# Patient Record
Sex: FEMALE | Race: WHITE | NOT HISPANIC OR LATINO | Employment: FULL TIME | URBAN - METROPOLITAN AREA
[De-identification: names, ages, dates, MRNs, and addresses within clinical notes are randomized per-mention and may not be internally consistent; named-entity substitution may affect disease eponyms.]

---

## 2017-08-14 ENCOUNTER — CONVERSION ENCOUNTER (OUTPATIENT)
Dept: GENERAL RADIOLOGY | Facility: HOSPITAL | Age: 42
End: 2017-08-14

## 2018-06-01 ENCOUNTER — CONVERSION ENCOUNTER (OUTPATIENT)
Dept: FAMILY MEDICINE CLINIC | Facility: CLINIC | Age: 43
End: 2018-06-01

## 2018-06-01 ENCOUNTER — OFFICE VISIT CONVERTED (OUTPATIENT)
Dept: FAMILY MEDICINE CLINIC | Facility: CLINIC | Age: 43
End: 2018-06-01
Attending: PHYSICIAN ASSISTANT

## 2018-06-19 ENCOUNTER — OFFICE VISIT CONVERTED (OUTPATIENT)
Dept: FAMILY MEDICINE CLINIC | Facility: CLINIC | Age: 43
End: 2018-06-19
Attending: PHYSICIAN ASSISTANT

## 2018-06-19 ENCOUNTER — CONVERSION ENCOUNTER (OUTPATIENT)
Dept: FAMILY MEDICINE CLINIC | Facility: CLINIC | Age: 43
End: 2018-06-19

## 2018-09-24 ENCOUNTER — OFFICE VISIT CONVERTED (OUTPATIENT)
Dept: FAMILY MEDICINE CLINIC | Facility: CLINIC | Age: 43
End: 2018-09-24
Attending: PHYSICIAN ASSISTANT

## 2018-09-24 ENCOUNTER — CONVERSION ENCOUNTER (OUTPATIENT)
Dept: FAMILY MEDICINE CLINIC | Facility: CLINIC | Age: 43
End: 2018-09-24

## 2018-10-25 ENCOUNTER — OFFICE VISIT CONVERTED (OUTPATIENT)
Dept: FAMILY MEDICINE CLINIC | Facility: CLINIC | Age: 43
End: 2018-10-25
Attending: PHYSICIAN ASSISTANT

## 2018-11-01 ENCOUNTER — OFFICE VISIT CONVERTED (OUTPATIENT)
Dept: FAMILY MEDICINE CLINIC | Facility: CLINIC | Age: 43
End: 2018-11-01
Attending: PHYSICIAN ASSISTANT

## 2019-01-02 ENCOUNTER — CONVERSION ENCOUNTER (OUTPATIENT)
Dept: FAMILY MEDICINE CLINIC | Facility: CLINIC | Age: 44
End: 2019-01-02

## 2019-01-02 ENCOUNTER — OFFICE VISIT CONVERTED (OUTPATIENT)
Dept: FAMILY MEDICINE CLINIC | Facility: CLINIC | Age: 44
End: 2019-01-02
Attending: NURSE PRACTITIONER

## 2019-01-02 ENCOUNTER — HOSPITAL ENCOUNTER (OUTPATIENT)
Dept: GENERAL RADIOLOGY | Facility: HOSPITAL | Age: 44
Discharge: HOME | End: 2019-01-02
Attending: NURSE PRACTITIONER

## 2019-05-23 ENCOUNTER — HOSPITAL ENCOUNTER (OUTPATIENT)
Dept: URGENT CARE | Facility: CLINIC | Age: 44
Discharge: HOME | End: 2019-05-23

## 2019-12-13 ENCOUNTER — OFFICE VISIT CONVERTED (OUTPATIENT)
Dept: FAMILY MEDICINE CLINIC | Facility: CLINIC | Age: 44
End: 2019-12-13
Attending: PHYSICIAN ASSISTANT

## 2020-03-09 ENCOUNTER — OFFICE VISIT CONVERTED (OUTPATIENT)
Dept: FAMILY MEDICINE CLINIC | Facility: CLINIC | Age: 45
End: 2020-03-09
Attending: PHYSICIAN ASSISTANT

## 2020-03-09 ENCOUNTER — CONVERSION ENCOUNTER (OUTPATIENT)
Dept: FAMILY MEDICINE CLINIC | Facility: CLINIC | Age: 45
End: 2020-03-09

## 2020-04-10 ENCOUNTER — OFFICE VISIT CONVERTED (OUTPATIENT)
Dept: FAMILY MEDICINE CLINIC | Facility: CLINIC | Age: 45
End: 2020-04-10
Attending: NURSE PRACTITIONER

## 2020-04-13 ENCOUNTER — TELEPHONE CONVERTED (OUTPATIENT)
Dept: FAMILY MEDICINE CLINIC | Facility: CLINIC | Age: 45
End: 2020-04-13
Attending: PHYSICIAN ASSISTANT

## 2020-08-20 ENCOUNTER — HOSPITAL ENCOUNTER (OUTPATIENT)
Dept: LAB | Facility: HOSPITAL | Age: 45
Discharge: HOME | End: 2020-08-20
Attending: PHYSICIAN ASSISTANT

## 2020-08-20 ENCOUNTER — OFFICE VISIT CONVERTED (OUTPATIENT)
Dept: FAMILY MEDICINE CLINIC | Facility: CLINIC | Age: 45
End: 2020-08-20
Attending: PHYSICIAN ASSISTANT

## 2020-08-20 LAB
ALBUMIN SERPL-MCNC: 4.5 G/DL (ref 3.5–5)
ALBUMIN/GLOB SERPL: 1.5 {RATIO} (ref 1.4–2.6)
ALP SERPL-CCNC: 60 U/L (ref 42–98)
ALT SERPL-CCNC: 41 U/L (ref 10–40)
AMPHETAMINES UR QL SCN: NEGATIVE
ANION GAP SERPL CALC-SCNC: 18 MMOL/L (ref 8–19)
APPEARANCE UR: ABNORMAL
AST SERPL-CCNC: 37 U/L (ref 15–50)
BARBITURATES UR QL SCN: NEGATIVE
BASOPHILS # BLD AUTO: 0.09 10*3/UL (ref 0–0.2)
BASOPHILS NFR BLD AUTO: 0.9 % (ref 0–3)
BENZODIAZ UR QL SCN: NEGATIVE
BILIRUB SERPL-MCNC: 0.45 MG/DL (ref 0.2–1.3)
BILIRUB UR QL: NEGATIVE
BUN SERPL-MCNC: 15 MG/DL (ref 5–25)
BUN/CREAT SERPL: 16 {RATIO} (ref 6–20)
CALCIUM SERPL-MCNC: 9.9 MG/DL (ref 8.7–10.4)
CHLORIDE SERPL-SCNC: 101 MMOL/L (ref 99–111)
COLOR UR: ABNORMAL
CONV ABS IMM GRAN: 0.02 10*3/UL (ref 0–0.2)
CONV BACTERIA: ABNORMAL
CONV CO2: 23 MMOL/L (ref 22–32)
CONV COCAINE, UR: NEGATIVE
CONV COLLECTION SOURCE (UA): ABNORMAL
CONV IMMATURE GRAN: 0.2 % (ref 0–1.8)
CONV TOTAL PROTEIN: 7.5 G/DL (ref 6.3–8.2)
CONV UROBILINOGEN IN URINE BY AUTOMATED TEST STRIP: 1 {EHRLICHU}/DL (ref 0.1–1)
CREAT UR-MCNC: 0.92 MG/DL (ref 0.5–0.9)
DEPRECATED RDW RBC AUTO: 40.8 FL (ref 36.4–46.3)
EOSINOPHIL # BLD AUTO: 0.41 10*3/UL (ref 0–0.7)
EOSINOPHIL # BLD AUTO: 4 % (ref 0–7)
ERYTHROCYTE [DISTWIDTH] IN BLOOD BY AUTOMATED COUNT: 13.2 % (ref 11.7–14.4)
EST. AVERAGE GLUCOSE BLD GHB EST-MCNC: 111 MG/DL
GFR SERPLBLD BASED ON 1.73 SQ M-ARVRAT: >60 ML/MIN/{1.73_M2}
GLOBULIN UR ELPH-MCNC: 3 G/DL (ref 2–3.5)
GLUCOSE SERPL-MCNC: 85 MG/DL (ref 65–99)
GLUCOSE UR QL: NEGATIVE MG/DL
HBA1C MFR BLD: 5.5 % (ref 3.5–5.7)
HCG UR QL: NEGATIVE
HCT VFR BLD AUTO: 40.6 % (ref 37–47)
HGB BLD-MCNC: 13 G/DL (ref 12–16)
HGB UR QL STRIP: ABNORMAL
KETONES UR QL STRIP: NEGATIVE MG/DL
LEUKOCYTE ESTERASE UR QL STRIP: NEGATIVE
LYMPHOCYTES # BLD AUTO: 3.77 10*3/UL (ref 1–5)
LYMPHOCYTES NFR BLD AUTO: 36.5 % (ref 20–45)
MCH RBC QN AUTO: 27.3 PG (ref 27–31)
MCHC RBC AUTO-ENTMCNC: 32 G/DL (ref 33–37)
MCV RBC AUTO: 85.3 FL (ref 81–99)
METHADONE UR QL SCN: NEGATIVE
MONOCYTES # BLD AUTO: 0.79 10*3/UL (ref 0.2–1.2)
MONOCYTES NFR BLD AUTO: 7.6 % (ref 3–10)
NEUTROPHILS # BLD AUTO: 5.25 10*3/UL (ref 2–8)
NEUTROPHILS NFR BLD AUTO: 50.8 % (ref 30–85)
NITRITE UR QL STRIP: NEGATIVE
NRBC CBCN: 0 % (ref 0–0.7)
OPIATES TESTED UR SCN: NEGATIVE
OSMOLALITY SERPL CALC.SUM OF ELEC: 286 MOSM/KG (ref 273–304)
OXYCODONE UR QL SCN: NEGATIVE
PCP UR QL: NEGATIVE
PH UR STRIP.AUTO: 6 [PH] (ref 5–8)
PLATELET # BLD AUTO: 334 10*3/UL (ref 130–400)
PMV BLD AUTO: 10.9 FL (ref 9.4–12.3)
POTASSIUM SERPL-SCNC: 3.6 MMOL/L (ref 3.5–5.3)
PROT UR QL: >=300 MG/DL
RBC # BLD AUTO: 4.76 10*6/UL (ref 4.2–5.4)
RBC #/AREA URNS HPF: ABNORMAL /[HPF]
SODIUM SERPL-SCNC: 138 MMOL/L (ref 135–147)
SP GR UR: >=1.03 (ref 1–1.03)
SQUAMOUS SPT QL MICRO: ABNORMAL /[HPF]
T4 FREE SERPL-MCNC: 1.3 NG/DL (ref 0.9–1.8)
THC SERPLBLD CFM-MCNC: NEGATIVE NG/ML
TSH SERPL-ACNC: 1.02 M[IU]/L (ref 0.27–4.2)
URATE SERPL-MCNC: 8.1 MG/DL (ref 2.5–7.5)
WBC # BLD AUTO: 10.33 10*3/UL (ref 4.8–10.8)
WBC #/AREA URNS HPF: ABNORMAL /[HPF]

## 2020-08-21 LAB
25(OH)D3 SERPL-MCNC: 25.4 NG/ML (ref 30–100)
IRON SATN MFR SERPL: 14 % (ref 20–55)
IRON SERPL-MCNC: 62 UG/DL (ref 60–170)
TIBC SERPL-MCNC: 438 UG/DL (ref 245–450)
TRANSFERRIN SERPL-MCNC: 306 MG/DL (ref 250–380)

## 2020-08-25 ENCOUNTER — HOSPITAL ENCOUNTER (OUTPATIENT)
Dept: GENERAL RADIOLOGY | Facility: HOSPITAL | Age: 45
Discharge: HOME | End: 2020-08-25
Attending: PHYSICIAN ASSISTANT

## 2020-09-21 ENCOUNTER — TELEMEDICINE CONVERTED (OUTPATIENT)
Dept: FAMILY MEDICINE CLINIC | Facility: CLINIC | Age: 45
End: 2020-09-21
Attending: PHYSICIAN ASSISTANT

## 2020-10-12 ENCOUNTER — HOSPITAL ENCOUNTER (OUTPATIENT)
Dept: PREADMISSION TESTING | Facility: HOSPITAL | Age: 45
Discharge: HOME | End: 2020-10-12
Attending: OBSTETRICS & GYNECOLOGY

## 2020-10-13 LAB — SARS-COV-2 RNA SPEC QL NAA+PROBE: NOT DETECTED

## 2020-12-07 ENCOUNTER — CONVERSION ENCOUNTER (OUTPATIENT)
Dept: FAMILY MEDICINE CLINIC | Facility: CLINIC | Age: 45
End: 2020-12-07

## 2020-12-07 ENCOUNTER — OFFICE VISIT CONVERTED (OUTPATIENT)
Dept: FAMILY MEDICINE CLINIC | Facility: CLINIC | Age: 45
End: 2020-12-07
Attending: PHYSICIAN ASSISTANT

## 2020-12-07 ENCOUNTER — HOSPITAL ENCOUNTER (OUTPATIENT)
Dept: FAMILY MEDICINE CLINIC | Facility: CLINIC | Age: 45
Discharge: HOME | End: 2020-12-07
Attending: PHYSICIAN ASSISTANT

## 2020-12-09 LAB — BACTERIA SPEC AEROBE CULT: NORMAL

## 2020-12-22 ENCOUNTER — TELEMEDICINE CONVERTED (OUTPATIENT)
Dept: FAMILY MEDICINE CLINIC | Facility: CLINIC | Age: 45
End: 2020-12-22
Attending: PHYSICIAN ASSISTANT

## 2021-01-05 ENCOUNTER — HOSPITAL ENCOUNTER (OUTPATIENT)
Dept: LAB | Facility: HOSPITAL | Age: 46
Discharge: HOME | End: 2021-01-05
Attending: PHYSICIAN ASSISTANT

## 2021-01-05 ENCOUNTER — HOSPITAL ENCOUNTER (OUTPATIENT)
Dept: GENERAL RADIOLOGY | Facility: HOSPITAL | Age: 46
Discharge: HOME | End: 2021-01-05
Attending: PHYSICIAN ASSISTANT

## 2021-01-05 LAB
25(OH)D3 SERPL-MCNC: 19 NG/ML (ref 30–100)
ALBUMIN SERPL-MCNC: 3.9 G/DL (ref 3.5–5)
ALBUMIN/GLOB SERPL: 1.2 {RATIO} (ref 1.4–2.6)
ALP SERPL-CCNC: 74 U/L (ref 42–98)
ALT SERPL-CCNC: 47 U/L (ref 10–40)
ANION GAP SERPL CALC-SCNC: 14 MMOL/L (ref 8–19)
APPEARANCE UR: ABNORMAL
AST SERPL-CCNC: 53 U/L (ref 15–50)
BASOPHILS # BLD AUTO: 0.08 10*3/UL (ref 0–0.2)
BASOPHILS NFR BLD AUTO: 0.8 % (ref 0–3)
BILIRUB SERPL-MCNC: 0.34 MG/DL (ref 0.2–1.3)
BILIRUB UR QL: NEGATIVE
BUN SERPL-MCNC: 12 MG/DL (ref 5–25)
BUN/CREAT SERPL: 18 {RATIO} (ref 6–20)
CALCIUM SERPL-MCNC: 9 MG/DL (ref 8.7–10.4)
CHLORIDE SERPL-SCNC: 102 MMOL/L (ref 99–111)
CHOLEST SERPL-MCNC: 207 MG/DL (ref 107–200)
CHOLEST/HDLC SERPL: 4.8 {RATIO} (ref 3–6)
COLOR UR: ABNORMAL
CONV ABS IMM GRAN: 0.03 10*3/UL (ref 0–0.2)
CONV BACTERIA: NEGATIVE
CONV CO2: 24 MMOL/L (ref 22–32)
CONV COLLECTION SOURCE (UA): ABNORMAL
CONV HYALINE CASTS IN URINE MICRO: ABNORMAL /[LPF]
CONV IMMATURE GRAN: 0.3 % (ref 0–1.8)
CONV TOTAL PROTEIN: 7.2 G/DL (ref 6.3–8.2)
CONV UROBILINOGEN IN URINE BY AUTOMATED TEST STRIP: 0.2 {EHRLICHU}/DL (ref 0.1–1)
CREAT UR-MCNC: 0.68 MG/DL (ref 0.5–0.9)
DEPRECATED RDW RBC AUTO: 41.1 FL (ref 36.4–46.3)
EOSINOPHIL # BLD AUTO: 0.44 10*3/UL (ref 0–0.7)
EOSINOPHIL # BLD AUTO: 4.2 % (ref 0–7)
ERYTHROCYTE [DISTWIDTH] IN BLOOD BY AUTOMATED COUNT: 13.9 % (ref 11.7–14.4)
ERYTHROCYTE [SEDIMENTATION RATE] IN BLOOD: 11 MM/H (ref 0–20)
GFR SERPLBLD BASED ON 1.73 SQ M-ARVRAT: >60 ML/MIN/{1.73_M2}
GLOBULIN UR ELPH-MCNC: 3.3 G/DL (ref 2–3.5)
GLUCOSE SERPL-MCNC: 114 MG/DL (ref 65–99)
GLUCOSE UR QL: NEGATIVE MG/DL
HCT VFR BLD AUTO: 41.6 % (ref 37–47)
HDLC SERPL-MCNC: 43 MG/DL (ref 40–60)
HGB BLD-MCNC: 13.8 G/DL (ref 12–16)
HGB UR QL STRIP: NEGATIVE
KETONES UR QL STRIP: NEGATIVE MG/DL
LDLC SERPL CALC-MCNC: 124 MG/DL (ref 70–100)
LEUKOCYTE ESTERASE UR QL STRIP: NEGATIVE
LYMPHOCYTES # BLD AUTO: 3.78 10*3/UL (ref 1–5)
LYMPHOCYTES NFR BLD AUTO: 35.9 % (ref 20–45)
MCH RBC QN AUTO: 27.3 PG (ref 27–31)
MCHC RBC AUTO-ENTMCNC: 33.2 G/DL (ref 33–37)
MCV RBC AUTO: 82.2 FL (ref 81–99)
MONOCYTES # BLD AUTO: 0.74 10*3/UL (ref 0.2–1.2)
MONOCYTES NFR BLD AUTO: 7 % (ref 3–10)
NEUTROPHILS # BLD AUTO: 5.46 10*3/UL (ref 2–8)
NEUTROPHILS NFR BLD AUTO: 51.8 % (ref 30–85)
NITRITE UR QL STRIP: NEGATIVE
NRBC CBCN: 0 % (ref 0–0.7)
OSMOLALITY SERPL CALC.SUM OF ELEC: 283 MOSM/KG (ref 273–304)
PH UR STRIP.AUTO: 5.5 [PH] (ref 5–8)
PLATELET # BLD AUTO: 306 10*3/UL (ref 130–400)
PMV BLD AUTO: 11.1 FL (ref 9.4–12.3)
POTASSIUM SERPL-SCNC: 4.2 MMOL/L (ref 3.5–5.3)
PROT UR QL: NEGATIVE MG/DL
RBC # BLD AUTO: 5.06 10*6/UL (ref 4.2–5.4)
RBC #/AREA URNS HPF: ABNORMAL /[HPF]
SODIUM SERPL-SCNC: 136 MMOL/L (ref 135–147)
SP GR UR: 1.02 (ref 1–1.03)
SQUAMOUS SPT QL MICRO: ABNORMAL /[HPF]
T4 FREE SERPL-MCNC: 1 NG/DL (ref 0.9–1.8)
TRIGL SERPL-MCNC: 200 MG/DL (ref 40–150)
TSH SERPL-ACNC: 2.39 M[IU]/L (ref 0.27–4.2)
VLDLC SERPL-MCNC: 40 MG/DL (ref 5–37)
WBC # BLD AUTO: 10.53 10*3/UL (ref 4.8–10.8)
WBC #/AREA URNS HPF: ABNORMAL /[HPF]

## 2021-01-06 LAB
ASO AB SERPL-ACNC: 478 [IU]/ML (ref 0–200)
CONV RHEUMATOID FACTOR IGM: <10 [IU]/ML (ref 0–14)
CRP SERPL-MCNC: 8.9 MG/L (ref 0–5)
DSDNA AB SER-ACNC: NEGATIVE [IU]/ML
ENA AB SER IA-ACNC: NEGATIVE {RATIO}
EST. AVERAGE GLUCOSE BLD GHB EST-MCNC: 120 MG/DL
HBA1C MFR BLD: 5.8 % (ref 3.5–5.7)
URATE SERPL-MCNC: 6.2 MG/DL (ref 2.5–7.5)

## 2021-01-12 ENCOUNTER — TELEMEDICINE CONVERTED (OUTPATIENT)
Dept: FAMILY MEDICINE CLINIC | Facility: CLINIC | Age: 46
End: 2021-01-12
Attending: PHYSICIAN ASSISTANT

## 2021-01-20 ENCOUNTER — HOSPITAL ENCOUNTER (OUTPATIENT)
Dept: GENERAL RADIOLOGY | Facility: HOSPITAL | Age: 46
Discharge: HOME | End: 2021-01-20
Attending: PHYSICIAN ASSISTANT

## 2021-01-27 ENCOUNTER — HOSPITAL ENCOUNTER (OUTPATIENT)
Dept: GENERAL RADIOLOGY | Facility: HOSPITAL | Age: 46
Discharge: HOME | End: 2021-01-27
Attending: PHYSICIAN ASSISTANT

## 2021-03-09 ENCOUNTER — HOSPITAL ENCOUNTER (OUTPATIENT)
Dept: FAMILY MEDICINE CLINIC | Facility: CLINIC | Age: 46
Discharge: HOME | End: 2021-03-09
Attending: PHYSICIAN ASSISTANT

## 2021-03-09 ENCOUNTER — TELEMEDICINE CONVERTED (OUTPATIENT)
Dept: FAMILY MEDICINE CLINIC | Facility: CLINIC | Age: 46
End: 2021-03-09
Attending: PHYSICIAN ASSISTANT

## 2021-03-11 LAB — SARS-COV-2 RNA SPEC QL NAA+PROBE: NOT DETECTED

## 2021-04-14 ENCOUNTER — OFFICE VISIT CONVERTED (OUTPATIENT)
Dept: NEUROSURGERY | Facility: CLINIC | Age: 46
End: 2021-04-14
Attending: PHYSICIAN ASSISTANT

## 2021-05-11 NOTE — H&P
History and Physical      Patient Name: Sushila Stark   Patient ID: [id number]   Sex: Female   YOB: 1975    Primary Care Provider: Srikanth Jules PA-C   Referring Provider: Srikanth Jules PA-C    Visit Date: April 14, 2021    Provider: Montse Caballero PA-C   Location: [facility]   Location Address: [address] [place] [address]  [id number]   Location Phone: [phone]          Chief Complaint  · Mid back      History Of Present Illness  The patient is a 45 year old /White female who is seen in consultation at the request of Srikanth Jules PA-C for evaluation of lower thoracic pain and radiates around to the ribs lower thoracic pain pain. She states the pain is moderate to severe in severity, is constant and has a sharp and aching quality. It began gradually 9 months ago. The pain radiates into the right rib cage and into the left rib cage.   She also complains of low back pain. The patient states the pain is aggravated by bending over and lifting. The patient Improves some with rest   The patient's past medical history is unremarkable   Recent Interventions  She has been previously treated with NSAIDs and tramadol. The NSAIDS were ineffective.   Information Reviewed  The following information was reviewed radiology reports and images. A MRI of the thoracic spine and from [facility] revealed small central disc protrusion at T11/12 causing mild central canal stenosis at this level with mild kyphosis at this level. These were the most notable findings.       Past Medical History  (HPV) DNA test positive; Abnormal PAP Smear; Allergic rhinitis due to allergen; Anxiety; Anxiety disorder; Depression; Essential hypertension; Hyperlipidemia; Hypertension; Impaired fasting glucose; Insomnia, unspecified; Kidney Stone; Migraine Headaches; Nicotine dependence; Streptococcal Sore Throat; Ureterolithiasis; Vitamin D deficiency         Past Surgical History  Cholecystectomy;  Conization; Cystoscopy with bilateral retrograde pyelography         Medication List  Aleve 220 mg oral tablet; alprazolam 0.5 mg oral tablet; escitalopram oxalate 20 mg oral tablet; furosemide 40 mg oral tablet; ibuprofen 200 mg oral capsule; lisinopril 10 mg oral tablet; potassium chloride 10 mEq oral tablet extended release; Ultram 50 mg oral tablet; Vitamin D2 1,250 mcg (50,000 unit) oral capsule; Xanax 0.5 mg oral tablet         Allergy List  Codeine Sulfate; PENICILLINS       Allergies Reconciled  Family Medical History  Bladder Neoplasm, Malignant; DM Type II; Hypertension         Reproductive History   0 Para 0 0 0 0       Social History  Active but no formal exercise; Alcohol (Current some day); ; Homemaker; No known infection risk; Tobacco (Current every day)         Immunizations  Name Date Admin   Tdap 2018         Review of Systems  · Constitutional  o Denies  o : chills, excessive sweating, fatigue, fever, sycope/passing out, weight gain, weight loss  · Eyes  o Denies  o : changes in vision, blurry vision, double vision  · HENT  o Denies  o : loss of hearing, ringing in the ears, ear aches, sore throat, nasal congestion, sinus pain, nose bleeds, seasonal allergies  · Cardiovascular  o Denies  o : blood clots, swollen legs, anemia, easy burising or bleeding, transfusions  · Respiratory  o Denies  o : shortness of breath, dry cough, productive cough, pneumonia, COPD  · Gastrointestinal  o Denies  o : difficulty swallowing, reflux  · Genitourinary  o Denies  o : incontinence  · Neurologic  o Denies  o : headache, seizure, stroke, tremor, loss of balance, falls, dizziness/vertigo, difficulty with sleep, numbness/tingling/paresthesia , difficulty with coordination, difficulty with dexterity, weakness  · Musculoskeletal  o Admits  o : low back pain, mid back pain  o Denies  o : neck stiffness/pain, swollen lymph nodes, muscle aches, joint pain, weakness, spasms, sciatica, pain radiating  "in arm, pain radiating in leg  · Endocrine  o Denies  o : diabetes, thyroid disorder  · Psychiatric  o Denies  o : anxiety, depression  · All Others Negative      Vitals  Date Time BP Position Site L\R Cuff Size HR RR TEMP (F) WT  HT  BMI kg/m2 BSA m2 O2 Sat FR L/min FiO2 HC       04/14/2021 08:55 AM        97.2 231lbs 9oz 5'  7\" 36.27 2.23             Physical Examination  · Constitutional  o Appearance  o : well nourished, well developed, alert, oriented, in no acute distress  · Respiratory  o Respiratory Effort  o : breathing unlabored, no accessory muscle use  o Inspection of Chest  o : normal appearance, no retractions  · Cardiovascular  o Peripheral Vascular System  o :   § Extremities  § : no cyanosis, clubbing or edema  · Musculoskeletal  o Spine  o :   § Stability  § : no subluxations present  § Range of Motion  § : range of motion normal  · Neurologic  o Mental Status Examination  o :   § Orientation  § : grossly oriented to person, place and time  o Motor Examination  o :   § RLE Strength  § : strength normal  § RLE Motor Function  § : tone normal, no atrophy, no abnormal movements noted  § LLE Strength  § : strength normal  § LLE Motor Function  § : tone normal, no atrophy, no abnormal movements noted  o Reflexes  o :   § RLE  § : 2/4 knee and ankle reflex, negative clonus, negative hyperreflexia  § LLE  § : 2/4 knee and ankle reflex, negative clonus, negative hyperreflexia  o Sensation  o :   § Light Touch  § : sensation intact to light touch in extremities  o Gait and Station  o :   § Gait Screening  § : normal gait, able to stand without difficulty  · Psychiatric  o Mood and Affect  o : mood normal, affect appropriate          Assessment  · Thoracic HNP without myelopathy     722.11  T11/12  · Thoracic Radiculitis     724.4/M54.14      Plan  · Orders  o ACO-17: Screened for tobacco use AND received tobacco cessation intervention (4004F) - - 04/14/2021  o ANKIT Report (KASPR) - - 04/14/2021  o PAIN " MANAGEMENT CONSULTATION (PAINM) - 722.11, 724.4/M54.14 - 04/14/2021   refer to CHAD Singh for bilateral T11/12 TFESI  · Medications  o Medications have been Reconciled  o Transition of Care or Provider Policy  · Instructions  o Tobacco Cessation Counseling: Encouraged to stop smoking.   o Encouraged to follow-up with Primary Care Provider for preventative care.  o The ROS and the PFSH were reviewed at today's visit.  o Call or return to office if symptoms persist or worsen.   o She has a T11/12 disc protrusion with mild central disc protrusion. I will refer her for bilateral T11/12 TFESI. I will discuss stronger pain medication with Dr. Nowak, since tramadol not controlling her pain. Surgery not advised at this point. F/U as needed.   · Associate Tasks  o Task ID [id number] General Task: josefina and forward to Dr. Nowak for pain medication            Electronically Signed by: Montse Caballero PA-C -Author on April 14, 2021 09:21:22 AM

## 2021-05-12 NOTE — PROGRESS NOTES
"   Progress Note      Patient Name: Sushila Levy   Patient ID: [id number]   Sex: Female   YOB: 1975    Primary Care Provider: Srikanth Jules PA-C   Referring Provider: Srikanth Jules PA-C    Visit Date: April 10, 2020    Provider: SHIRAZ Robles   Location: [facility]   Location Address: [address] [place] [address], [address]  [place], [place]  [id number]   Location Phone: [phone]          Chief Complaint  · Sore throat     Pt c/o a sore throat.  Started around 2-3 days ago and gets worse every day    Pt c/o feeling like her throat is \"swollen\"    Cough - slightly productive. Pt states it is normal due to her being a current tobacco user    Denies SOA, chest pain or tightness    No fever noted at this time    C/O a frequent H/A mainly in the parietal and occipital lobes       History Of Present Illness  Sushila Levy is a 44 year old /White female who presents for evaluation and treatment of:      the patient presents to the respiratory clinic today and she has c/o sore throat, throat fullness and swelling prod cough nicotine dependent and HA...she denies chest pain soa taste/smell loss       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 --  --    Abnormal PAP Smear --  --    Allergic rhinitis due to allergen 02/14/2017 --    Anxiety --  --    Anxiety disorder 02/14/2017 --    Depression --  --    Essential hypertension 11/01/2018 --    Hyperlipidemia --  --    Hypertension --  --    Insomnia, unspecified 04/13/2020 --    Kidney Stone --  --    Migraine Headaches --  --    Nicotine dependence 09/24/2018 --    Streptococcal Sore Throat --  --    Ureterolithiasis 08/18/2016 --          Past Surgical History  Procedure Name Date Notes   Cholecystectomy --  --    Conization --  --    Cystoscopy with bilateral retrograde pyelography --  --          Medication List  Name Date Started Instructions   Ambien 10 mg oral tablet 04/09/2020 take 1 tablet (10 mg) by oral route " once daily at bedtime for 30 days   diclofenac sodium 50 mg oral tablet,delayed release (DR/EC) 2019 take 1 tablet (50 mg) by oral route 2 times per day with food   ferrous sulfate 325 mg (65 mg iron) oral tablet 2019 take 1 tablet (325 mg) by oral route 2 for 30 days   furosemide 40 mg oral tablet 2019 take 1-2 tablets by oral route daily for 30 days   K-Tab 10 mEq oral tablet extended release 2019 take 2 tablets (20 meq) by oral route once daily with food for 30 days   Lexapro 20 mg oral tablet 2019 take 1 tablet (20 mg) by oral route once daily   lisinopril 10 mg oral tablet 2019 take 1 tablet (10 mg) by oral route once daily for 30 days   Ventolin HFA 90 mcg/actuation inhalation HFA aerosol inhaler 2019 inhale 1 - 2 puffs (90 - 180 mcg) by inhalation route every 4-6 hours as needed   Wellbutrin  mg oral tablet extended release 24 hr 2019 take 1 tablet (300 mg) by oral route once daily for 30 days   Xanax 0.5 mg oral tablet 2020 take 1 tablet (0.5 mg) by oral route 3 times per day PRN anxiety for 30 days         Allergy List  Allergen Name Date Reaction Notes   Codeine Sulfate --  --  --    PENICILLINS --  --  --          Family Medical History  Disease Name Relative/Age Notes   Bladder Neoplasm, Malignant  --    DM Type II  --    Hypertension Mother/   --          Reproductive History  Menstrual   Age Menarche: 14   Pregnancy Summary   Total Pregnancies: 0 Full Term: 0 Premature: 0   Ab Induced: 0 Ab Spontaneous: 0 Ectopics: 0   Multiples: 0 Livin         Social History  Finding Status Start/Stop Quantity Notes   Active but no formal exercise --  --/-- --  --    Alcohol Current some day --/-- --  --     --  --/-- --   name was Sushila Levy.   Homemaker --  --/-- --  --    No known infection risk --  --/-- --  --    Tobacco Current every day 20/-- 1PPD 2018 - still smoking 2018 - 1ppd Stopped smoking at age 36 and started  "smoking again as of 8/10/16.         Immunizations  NameDate Admin Mfg Trade Name Lot Number Route Inj VIS Given VIS Publication   Tdap06/19/2018 SKB BOOSTRIX  IM  06/19/2018 02/24/2015   Comments: tolerated well. NDC [id number]         Review of Systems  · Constitutional  o Admits  o : m/f  · Eyes  o Denies  o : double vision, blurred vision  · HENT  o Admits  o : sore throat  · Cardiovascular  o Denies  o : chest pain, irregular heart beats  · Respiratory  o Admits  o : prod cough, nicotine dep  o Denies  o : soa, CP  · Gastrointestinal  o Denies  o : nausea, vomiting, diarrhea, abd pain  · Neurologic  o Admits  o : HA  o Denies  o : altered mental status, seizures      Vitals  Date Time BP Position Site L\R Cuff Size HR RR TEMP (F) WT  HT  BMI kg/m2 BSA m2 O2 Sat HC       04/10/2020 02:38 /98 Sitting    80 - R  97.8  5'  7\"   99 %          Physical Examination  · Constitutional  o Appearance  o : well-nourished, well developed, alert, in no acute distress  · Head and Face  o Head  o :   § Inspection  § : atraumatic, normocephalic  o Face  o :   § Inspection  § : no facial lesions  § Palpation  § : no sinus tenderness on palpation  · Ears, Nose, Mouth and Throat  o Ears  o :   § External Ears  § : appearance within normal limits, no lesions present  § Otoscopic Examination  § : tympanic membrane appearance within normal limits bilaterally without perforations, mobility normal  o Nose  o :   § External Nose  § : normal stucture noted.  o Oral Cavity  o :   § Oral Mucosa  § : oral mucosa normal without pallor or cyanosis  § Lips  § : lip appearance normal  § Teeth  § : normal dentition for age  § Gums  § : gums pink, non-swollen, no bleeding present  § Tongue  § : tongue appearance normal  § Palate  § : hard palate normal, soft palate appearance normal  o Throat  o :   § Oropharynx  § : oropharynx inflammation present   · Neck  o Thyroid  o : gland size normal, nontender, no nodules or masses present on " palpation, thyroid motion normal during swallowing  · Respiratory  o Respiratory Effort  o : breathing unlabored  o Auscultation of Lungs  o : normal breath sounds throughout  · Cardiovascular  o Heart  o :   § Auscultation of Heart  § : regular rate and rhythm, no murmurs, gallops or rubs  § Palpation of Heart  § : normal apical impulse, no cardiac thrill present  o Peripheral Vascular System  o :   § Carotid Arteries  § : normal pulses bilaterally, no bruits present  § Extremities  § : no cyanosis, clubbing or edema; less than 2 second refill noted  · Skin and Subcutaneous Tissue  o General Inspection  o : no rashes or lesions present, no areas of discoloration  · Neurologic  o Mental Status Examination  o :   § Orientation  § : grossly oriented to person, place and time  o Cranial Nerves  o : cranial nerves intact and symmetric throughout  · Psychiatric  o Mood and Affect  o : mood normal, affect appropriate, denies any SI/HI          Results  · In-Office Procedures  o Lab procedure  § IOP - Influenza A/B Test (73285)   § Influenza A: Negative   § Influenza B: Negative   § Internal Control Verified?: Yes   § IOP - Rapid Strep (16894)   § Beta Strep Gp A Culture: Negative   § Internal Control Verified?: Yes       Assessment  · Cough     786.2/R05  · Fatigue     780.79/R53.83  · Headache     784.0/R51  · Nicotine dependence     305.1/F17.200  · Pharyngitis, acute     462/J02.9    Problems Reconciled  Plan  · Orders  o ACO-17: Screened for tobacco use AND received tobacco cessation intervention (4004F) - 305.1/F17.200 - 04/10/2020  o ACO-39: Current medications updated and reviewed () - - 04/10/2020  · Medications  o cefdinir 300 mg oral capsule   SIG: take 1 capsule (300 mg) by oral route every 12 hours for 10 days   DISP: (20) capsules with 0 refills  Prescribed on 04/10/2020     o Diflucan 150 mg oral tablet   SIG: take 1 tablet (150 mg) by oral route once for 1 day   DISP: (1) tablet with 0  refills  Prescribed on 04/10/2020     o Medications have been Reconciled  o Transition of Care or Provider Policy  · Instructions  o *Form of nicotine being used:   o Patient was strongly encouraged to discontinue use of any nicotine containing product or minimize the use of the product.  o Handouts were given to patient:   o Patient is taking medications as prescribed and doing well.   o Take all medications as prescribed/directed.  o Patient was educated/instructed on their diagnosis, treatment and medications prior to discharge from the clinic today.  o Patient instructed to seek medical attention urgently for new or worsening symptoms.  o Call the office with any concerns or questions.  o Chronic conditions reviewed and taken into consideration for today's treatment plan.  o Discussed Covid-19 precautions including, but not limited to, social distancing, avoid touching your face, and hand washing.   · Disposition  o Call or Return if symptoms worsen or persist.  o follow up prn or as needed  o Care Transition            Electronically Signed by: SHIRAZ Robles -Author on April 14, 2020 09:35:20 PM

## 2021-05-12 NOTE — PROGRESS NOTES
Quick Note      Patient Name: Sushila Levy   Patient ID: [id number]   Sex: Female   YOB: 1975    Primary Care Provider: Srikanth Jules PA-C   Referring Provider: Srikanth Jules PA-C    Visit Date: April 13, 2020    Provider: Srikanth Jules PA-C   Location: [facility]   Location Address: [address] [place] [address], [address]  [place], [place]  [id number]   Location Phone: [phone]          History Of Present Illness  TELEHEALTH VISIT  Chief Complaint: 4 month follow up   Sushila Levy is a 44 year old /White female who is presenting for evaluation via telehealth visit. Verbal consent obtained before beginning visit.   Provider spent 10 minutes with the patient during telehealth visit.   The following staff were present during this visit: Hanane Azevedo CMA/Srikanth Jules PA-C   Past Medical History/Overview of Patient Symptoms  Sushila Levy is a 44 year old /White female who presents for evaluation and treatment of:      pt presents today for 4 month follow up.    pt was just seen in the respiratory clinic on 4/10/20 and tested negative for strep throat/flu.    no refills needed at this time.    Pt is feeling much better being tx for sinusitis             Assessment  · Anxiety disorder     300.00/F41.9  · Insomnia, unspecified     780.52/G47.00  · Nicotine dependence     305.1/F17.200  · Upper respiratory infection     465.9/J06.9      Plan  · Orders  o Physican Telephone evaluation, 5-10 min (69379) - - 04/13/2020  o ACO-39: Current medications updated and reviewed () - - 04/13/2020  · Medications  o Medications have been Reconciled  o Transition of Care or Provider Policy  · Instructions  o Avoid any electronic use for at least 30 minutes prior to bed time. Cell phone screens, tablets and TVs imitate daylight, so your brain can become confused on the time of day. No caffeine use in the late afternoon and evenings.  o *Form of nicotine being used: cigs  o Patient was strongly encouraged  to discontinue use of any nicotine containing product or minimize the use of the product.  o Plan Of Care:   o Patient counseled to stop smoking.  · Disposition  o Call or Return if symptoms worsen or persist.  o Care Transition            Electronically Signed by: Srikanth Jules PA-C -Author on April 13, 2020 12:18:04 PM

## 2021-05-13 NOTE — PROGRESS NOTES
Progress Note      Patient Name: Sushila Stark   Patient ID: [id number]   Sex: Female   YOB: 1975    Primary Care Provider: Srikanth Jules PA-C   Referring Provider: Srikanth Jules PA-C    Visit Date: August 20, 2020    Provider: Srikanth Jules PA-C   Location: [facility]   Location Address: [address] [place] [address], [address]  [place], [place]  [id number]   Location Phone: [phone]          Chief Complaint  · Fatigue  · Heavy menses      History Of Present Illness  Sushila Stark is a 45 year old /White female who presents for evaluation and treatment of:      fatigue and a heavy menses    Pt states that since her stillbirth in 2017, her cycles have been extremely heavy.  They last for 4-5 days and produce large blood clots.      Pt c/o fatigue, extreme.  Worse during cycle    However, she is supposed to be taking FeSO4. Pt had d/c that.     She has been taking her anxiety/depression meds (Wellbutrin and Lexapro) but does not feel it is working     Does not have an OB/GYN at this time.    Pt had an induction for stillborne in 2017.  She has had heavy menses since then.  She bleed for 1 month post.    She has 3-4 days with heavy clots and 6-7 days total.  She has iron def and not taking her iron.  Pads - 6-7 pads per day    Menses is regular    Pt cont to smoke and not ready to quit.       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 --  --    Abnormal PAP Smear --  --    Allergic rhinitis due to allergen 02/14/2017 --    Anxiety --  --    Anxiety disorder 02/14/2017 --    Depression --  --    Essential hypertension 11/01/2018 --    Hyperlipidemia --  --    Hypertension --  --    Insomnia, unspecified 04/13/2020 --    Kidney stone --  --    Migraine Headaches --  --    Nicotine dependence 09/24/2018 --    Streptococcal Sore Throat --  --    Ureterolithiasis 08/18/2016 --    Vitamin D deficiency 08/20/2020 --          Past Surgical History  Procedure Name Date Notes    Cholecystectomy --  --    Conization --  --    Cystoscopy with bilateral retrograde pyelography --  --          Medication List  Name Date Started Instructions   Ambien 10 mg oral tablet 08/20/2020 take 1 tablet (10 mg) by oral route once daily at bedtime for 30 days   bupropion HCl 300 mg oral tablet extended release 24 hr 06/29/2020 TAKE ONE TABLET BY MOUTH ONCE DAILY   diclofenac sodium 50 mg oral tablet,delayed release (DR/EC) 05/05/2020 take 1 tablet (50 mg) by oral route 2 times per day with food   escitalopram oxalate 20 mg oral tablet 06/29/2020 TAKE ONE TABLET BY MOUTH ONCE DAILY   ferrous sulfate 325 mg (65 mg iron) oral tablet 06/29/2020 TAKE ONE TABLET BY MOUTH TWICE DAILY   furosemide 40 mg oral tablet 06/29/2020 TAKE 1 OR 2 TABLETS BY MOUTH EVERY DAY   lisinopril 10 mg oral tablet 06/29/2020 TAKE ONE TABLET BY MOUTH ONCE DAILY   montelukast 10 mg oral tablet 06/29/2020 TAKE ONE TABLET BY MOUTH ONCE DAILY IN THE EVENING   potassium chloride 10 mEq oral tablet extended release 06/29/2020 TAKE TWO TABLETS BY MOUTH EVERY DAY WITH food   Singulair 10 mg oral tablet 05/05/2020 take 1 tablet (10 mg) by oral route once daily in the evening   Ventolin HFA 90 mcg/actuation inhalation HFA aerosol inhaler 11/05/2019 inhale 1 - 2 puffs (90 - 180 mcg) by inhalation route every 4-6 hours as needed   Wellbutrin  mg oral tablet extended release 24 hr 05/28/2020 take 1 tablet (300 mg) by oral route once daily for 30 days   Xanax 0.5 mg oral tablet 04/09/2020 take 1 tablet (0.5 mg) by oral route 3 times per day PRN anxiety for 30 days         Allergy List  Allergen Name Date Reaction Notes   Codeine Sulfate --  --  --    PENICILLINS --  --  --          Family Medical History  Disease Name Relative/Age Notes   Bladder Neoplasm, Malignant  --    DM Type II  --    Hypertension Mother/   --          Reproductive History  Menstrual   Age Menarche: 14   Pregnancy Summary   Total Pregnancies: 0 Full Term: 0 Premature: 0  "  Ab Induced: 0 Ab Spontaneous: 0 Ectopics: 0   Multiples: 0 Livin         Social History  Finding Status Start/Stop Quantity Notes   Active but no formal exercise --  --/-- --  --    Alcohol Current some day --/-- --  --     --  --/-- --   name was Sushila Levy.   Homemaker --  --/-- --  --    No known infection risk --  --/-- --  --    Tobacco Current every day 20/-- 1PPD 2018 - still smoking 2018 - 1ppd Stopped smoking at age 36 and started smoking again as of 8/10/16.         Immunizations  NameDate Admin Mfg Trade Name Lot Number Route Inj VIS Given VIS Publication   Tdap2018 SKB BOOSTRIX  IM  2018   Comments: tolerated well. NDC [id number]         Review of Systems  · Constitutional  o Admits  o : fatigue  o Denies  o : fever, weight loss, weight gain  · Cardiovascular  o Denies  o : lower extremity edema, claudication, chest pressure, palpitations  · Respiratory  o Denies  o : shortness of breath, wheezing, cough, hemoptysis, dyspnea on exertion  · Gastrointestinal  o Denies  o : nausea, vomiting, diarrhea, constipation, abdominal pain      Vitals  Date Time BP Position Site L\R Cuff Size HR RR TEMP (F) WT  HT  BMI kg/m2 BSA m2 O2 Sat        2020 08:48 /62 Sitting    86 - R   229lbs 8oz 5'  7\" 35.94 2.22 99 %          Physical Examination  · Constitutional  o Appearance  o : overweight, well developed, alert, in no acute distress  · Head and Face  o Head  o : normocephalic, atraumatic  · Ears, Nose, Mouth and Throat  o Ears  o :   § External Ears  § : external auditory canal appearance normal, no discharge present  § Otoscopic Examination  § : tympanic membranes pearly white/gray bilaterally  o Nose  o :   § External Nose  § : no lesions noted  § Nasopharynx  § : no discharge present  o Oral Cavity  o :   § Oral Mucosa  § : oral mucosa light pink  o Throat  o :   § Oropharynx  § : tonsils without exudate, no palatal " petechiae  · Neck  o Inspection/Palpation  o : normal appearance, no masses or tenderness, trachea midline  o Thyroid  o : gland size normal, nontender, no nodules or masses present on palpation  · Respiratory  o Respiratory Effort  o : breathing unlabored  o Inspection of Chest  o : chest rise symmetric bilaterally  o Auscultation of Lungs  o : clear to auscultation bilaterally throughout inspiration and expiration  · Cardiovascular  o Heart  o :   § Auscultation of Heart  § : regular rate and rhythm, no murmurs, gallops or rubs  o Peripheral Vascular System  o :   § Extremities  § : no edema  · Lymphatic  o Neck  o : no cervical lymphadenopathy, no supraclavicular lymphadenopathy  · Psychiatric  o Mood and Affect  o : mood normal, affect appropriate          Assessment  · Allergic rhinitis due to allergen     477.9/J30.9  · Essential hypertension     401.9/I10  · Fatigue     780.79/R53.83  · Insomnia, unspecified     780.52/G47.00  · Nicotine dependence     305.1/F17.200  · Class 2 severe obesity due to excess calories with serious comorbidity and body mass index (BMI) of 35.0 to 35.9 in adult       Morbid (severe) obesity due to excess calories     278.01/E66.01  Body mass index (BMI) 35.0-35.9, adult     278.01/Z68.35  · Vitamin D deficiency     268.9/E55.9  · Visit for screening mammogram     V76.12/Z12.31  · Heavy menses     626.2/N92.0  · Iron deficiency     280.9/E61.1      Plan  · Orders  o Uric Acid (Serum) (22624) - 401.9/I10 - 08/20/2020  o ACO-17: Screened for tobacco use AND received tobacco cessation intervention (4004F) - 305.1/F17.200 - 08/20/2020  o Vitamin D Level (71320) - 268.9/E55.9 - 08/20/2020  o Screening Mammography; Bilateral 3D (44785, 07704, ) - V76.12/Z12.31 - 08/20/2020  o CBC with Auto Diff [facility] (29734) - - 08/20/2020  o CMP [facility] (55853) - - 08/20/2020  o Hgb A1c [facility] (04343) - - 08/20/2020  o Lipid Panel [facility] (57526) - - 08/20/2020  o Thyroid Profile (43640, 80729, THYII) - -  08/20/2020  o Urinalysis with Reflex Microscopy if abnormal ([facility]) (06965) - - 08/20/2020  o ANKIT Report (KASPR) - - 08/20/2020  o ACO-39: Current medications updated and reviewed () - - 08/20/2020  o Pelvic U/S (Complete) (37114) - - 08/20/2020  o Urine pregnancy test (82630) - - 08/20/2020  o Iron Profile (Iron 85207 TIBC 50180 and Transferrin 77145) (IRONP) - - 08/20/2020  o Urine Drug Screen ([facility]) (27046) - - 08/20/2020  · Medications  o Trintellix 10 mg oral tablet   SIG: take 1 tablet (10 mg) by oral route once daily at the same time each day   DISP: (90) tablets with 1 refills  Prescribed on 08/20/2020     o Ambien 10 mg oral tablet   SIG: take 1 tablet (10 mg) by oral route once daily at bedtime for 30 days   DISP: (30) tablets with 1 refills  Refilled on 08/20/2020     o cefdinir 300 mg oral capsule   SIG: take 1 capsule (300 mg) by oral route every 12 hours for 10 days   DISP: (20) capsules with 0 refills  Discontinued on 08/20/2020     o Diflucan 150 mg oral tablet   SIG: take 1 tablet (150 mg) by oral route once for 1 day   DISP: (1) tablet with 0 refills  Discontinued on 08/20/2020     o K-Tab 10 mEq oral tablet extended release   SIG: take 2 tablets (20 meq) by oral route once daily with food for 30 days   DISP: (60) Tablet with 0 refills  Discontinued on 08/20/2020     o Lexapro 20 mg oral tablet   SIG: take 1 tablet (20 mg) by oral route once daily   DISP: (30) Tablet with 0 refills  Discontinued on 08/20/2020     o Medications have been Reconciled  o Transition of Care or Provider Policy  · Instructions  o Patient advised to monitor blood pressure (B/P) at home and journal readings. Patient informed that a B/P reading at home of more than 130/80 is considered hypertension. For readings greater nxiq695/90 or higher patient is advised to follow up in the office with readings for management. Patient advised to limit sodium intake.  o Avoid any electronic use for at least 30 minutes prior to bed  time. Cell phone screens, tablets and TVs imitate daylight, so your brain can become confused on the time of day. No caffeine use in the late afternoon and evenings.  o *Form of nicotine being used: cigs  o Patient was strongly encouraged to discontinue use of any nicotine containing product or minimize the use of the product.  o Obtained a written consent for ANKIT query. Discussed the risk and benefits of the use of controlled substances with the patient, including the risk of tolerance and drug dependence. The patient has been counseled on the need to have an exit strategy, including potentially discontinuing the use of controlled substances. ANKIT has or will be reviewed as soon as it becomes avaliable.  o Take all medications as prescribed/directed.  o Patient instructed/educated on their diet and exercise program.  o Patient was educated/instructed on their diagnosis, treatment and medications prior to discharge from the clinic today.  o Discussed Covid-19 precautions including, but not limited to, social distancing, avoid touching your face, and hand washing.   · Disposition  o Call or Return if symptoms worsen or persist.  o F/U in clinic in 1 month.  o Care Transition            Electronically Signed by: Srikanth Jules PA-C -Author on August 21, 2020 06:37:24 AM

## 2021-05-13 NOTE — PROGRESS NOTES
Progress Note      Patient Name: Sushila Stark   Patient ID: [id number]   Sex: Female   YOB: 1975    Primary Care Provider: Srikanth Jules PA-C   Referring Provider: Srikanth Jules PA-C    Visit Date: December 7, 2020    Provider: Srikanth Jules PA-C   Location: [facility]   Location Address: [address] [place] [address], [address]  [place], [place]  [id number]   Location Phone: [phone]          Chief Complaint  · possible strep throat      History Of Present Illness  Sushila Stark is a 45 year old /White female who presents for evaluation and treatment of: possible strep throat.      pt presents today for possible strep throat.    pt states symptoms started yesterday with sore throat and fatigue, denies any fever, soa or cough.    pt has white spots in throat.    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 --  --    Abnormal PAP Smear --  --    Allergic rhinitis due to allergen 02/14/2017 --    Anxiety --  --    Anxiety disorder 02/14/2017 --    Depression --  --    Essential hypertension 11/01/2018 --    Hyperlipidemia --  --    Hypertension --  --    Insomnia, unspecified 04/13/2020 --    Kidney stone --  --    Migraine Headaches --  --    Nicotine dependence 09/24/2018 --    Streptococcal Sore Throat --  --    Ureterolithiasis 08/18/2016 --    Vitamin D deficiency 08/20/2020 --          Past Surgical History  Procedure Name Date Notes   Cholecystectomy --  --    Conization --  --    Cystoscopy with bilateral retrograde pyelography --  --          Medication List  Name Date Started Instructions   allopurinol 100 mg oral tablet 08/21/2020 take 1 tablet (100 mg) by oral route once daily for 30 days   Ambien 10 mg oral tablet 09/21/2020 take 1 tablet (10 mg) by oral route once daily at bedtime for 30 days   bupropion HCl 300 mg oral tablet  extended release 24 hr 08/31/2020 TAKE ONE TABLET BY MOUTH ONCE DAILY   diclofenac sodium 50 mg oral tablet,delayed release (DR/EC) 05/05/2020 take 1 tablet (50 mg) by oral route 2 times per day with food   ergocalciferol (vitamin D2) 1,250 mcg (50,000 unit) oral capsule 08/21/2020 take 1 capsule by oral route once week.   escitalopram oxalate 20 mg oral tablet 08/31/2020 TAKE ONE TABLET BY MOUTH ONCE DAILY   ferrous sulfate 325 mg (65 mg iron) oral tablet 10/04/2020 TAKE ONE TABLET BY MOUTH TWICE DAILY   furosemide 40 mg oral tablet 10/04/2020 TAKE 1 OR 2 TABLETS BY MOUTH EVERY DAY   lisinopril 10 mg oral tablet 08/31/2020 TAKE ONE TABLET BY MOUTH ONCE DAILY   montelukast 10 mg oral tablet 10/04/2020 TAKE ONE TABLET BY MOUTH ONCE DAILY IN THE EVENING   potassium chloride 10 mEq oral tablet extended release 06/29/2020 TAKE TWO TABLETS BY MOUTH EVERY DAY WITH food   Replaced/Ret[employer] Drug 10 mEq oral tablet extended release 10/04/2020 TAKE TWO TABLETS BY MOUTH EVERY DAY WITH food   Singulair 10 mg oral tablet 05/05/2020 take 1 tablet (10 mg) by oral route once daily in the evening   Trintellix 10 mg oral tablet 08/20/2020 take 1 tablet (10 mg) by oral route once daily at the same time each day   Ventolin HFA 90 mcg/actuation inhalation HFA aerosol inhaler 11/05/2019 inhale 1 - 2 puffs (90 - 180 mcg) by inhalation route every 4-6 hours as needed   Wellbutrin  mg oral tablet extended release 24 hr 05/28/2020 take 1 tablet (300 mg) by oral route once daily for 30 days   Xanax 0.5 mg oral tablet 12/02/2020 take 1 tablet (0.5 mg) by oral route 3 times per day PRN anxiety for 30 days         Allergy List  Allergen Name Date Reaction Notes   Codeine Sulfate --  --  --    PENICILLINS --  --  --        Allergies Reconciled  Family Medical History  Disease Name Relative/Age Notes   Bladder Neoplasm, Malignant  --    DM Type II  --    Hypertension Mother/   --          Reproductive History  Menstrual   Age Menarche: 14  "  Pregnancy Summary   Total Pregnancies: 0 Full Term: 0 Premature: 0   Ab Induced: 0 Ab Spontaneous: 0 Ectopics: 0   Multiples: 0 Livin         Social History  Finding Status Start/Stop Quantity Notes   Active but no formal exercise --  --/-- --  --    Alcohol Current some day --/-- --  --     --  --/-- --   name was Sushila Levy.   Homemaker --  --/-- --  --    No known infection risk --  --/-- --  --    Tobacco Current every day 20/-- 1PPD 2018 - still smoking 2018 - 1ppd Stopped smoking at age 36 and started smoking again as of 8/10/16.         Immunizations  NameDate Admin Mfg Trade Name Lot Number Route Inj VIS Given VIS Publication   Tdap2018 SKB BOOSTRIX  IM  2018   Comments: tolerated well. NDC [id number]         Review of Systems  · Constitutional  o Denies  o : fever, fatigue, weight loss, weight gain  · Cardiovascular  o Denies  o : lower extremity edema, claudication, chest pressure, palpitations  · Respiratory  o Denies  o : shortness of breath, wheezing, cough, hemoptysis, dyspnea on exertion  · Gastrointestinal  o Denies  o : nausea, vomiting, diarrhea, constipation, abdominal pain      Vitals  Date Time BP Position Site L\R Cuff Size HR RR TEMP (F) WT  HT  BMI kg/m2 BSA m2 O2 Sat FR L/min FiO2 HC       2020 03:14 /89 Sitting    83 - R    5'  7\"   98 %            Physical Examination  · Constitutional  o Appearance  o : well developed, well-nourished, no acute distress  · Head and Face  o Head  o : normocephalic, atraumatic  · Ears, Nose, Mouth and Throat  o Ears  o :   § External Ears  § : external auditory canal appearance normal, no discharge present  § Otoscopic Examination  § : tympanic membranes pearly white/gray bilaterally  o Nose  o :   § External Nose  § : no lesions noted  § Nasopharynx  § : serous discharge present   o Oral Cavity  o :   § Oral Mucosa  § : oral mucosa light pink  o Throat  o :   § Oropharynx  § : " oropharynx inflammation present, tonsils within normal limits  · Neck  o Inspection/Palpation  o : normal appearance, no masses or tenderness, trachea midline  o Thyroid  o : gland size normal, nontender, no nodules or masses present on palpation  · Respiratory  o Respiratory Effort  o : breathing unlabored  o Inspection of Chest  o : chest rise symmetric bilaterally  o Auscultation of Lungs  o : clear to auscultation bilaterally throughout inspiration and expiration  · Cardiovascular  o Heart  o :   § Auscultation of Heart  § : regular rate and rhythm, no murmurs, gallops or rubs  o Peripheral Vascular System  o :   § Extremities  § : no edema  · Lymphatic  o Neck  o : no cervical lymphadenopathy, no supraclavicular lymphadenopathy  · Psychiatric  o Mood and Affect  o : mood normal, affect appropriate     Uvula - swelling and exudative           Results  · In-Office Procedures  o Lab procedure  § Rapid group A Streptococcus screen (41766)   § Beta Strep Gp A Culture: Negative   § Internal Control Verified?: Yes       Assessment  · Fatigue     780.79/R53.83  · Nicotine dependence     305.1/F17.200  · Obesity     278.00/E66.9  · Pharyngitis, acute     462/J02.9  · Sore throat     462/J02.9      Plan  · Orders  o ACO-17: Screened for tobacco use AND received tobacco cessation intervention (4004F) - 305.1/F17.200 - 12/07/2020  o Throat culture and sensitivity (78586) - 462/J02.9 - 12/07/2020  o ACO-39: Current medications updated and reviewed (1159F, ) - - 12/07/2020  · Medications  o Keflex 500 mg oral capsule   SIG: take 1 capsule by oral route 3 times a day   DISP: (30) Capsule with 0 refills  Prescribed on 12/07/2020     o Medications have been Reconciled  o Transition of Care or Provider Policy  · Instructions  o *Form of nicotine being used: cigs  o Patient was strongly encouraged to discontinue use of any nicotine containing product or minimize the use of the product.  o Take all medications as  prescribed/directed.  o Patient instructed/educated on their diet and exercise program.  o Patient was educated/instructed on their diagnosis, treatment and medications prior to discharge from the clinic today.  o Patient counseled to stop smoking.  o Patient counseled to reduce calorie intake.  o Patient was instructed to exercise regularly.  o Discussed Covid-19 precautions including, but not limited to, social distancing, avoid touching your face, and hand washing.   · Disposition  o Call or Return if symptoms worsen or persist.  o Care Transition            Electronically Signed by: Srikanth Jules PA-C -Author on December 7, 2020 04:04:39 PM

## 2021-05-13 NOTE — PROGRESS NOTES
Progress Note      Patient Name: Sushila Stark   Patient ID: [id number]   Sex: Female   YOB: 1975    Primary Care Provider: Srikanth Jules PA-C   Referring Provider: Srikanth Jules PA-C    Visit Date: September 21, 2020    Provider: Srikanth Jules PA-C   Location: [facility]   Location Address: [address] [place] [address], [address]  [place], [place]  [id number]   Location Phone: [phone]          Chief Complaint  · 1 month f/u      History Of Present Illness  Video Conferencing Visit  Sushila Stark is a 45 year old /White female who is presenting for evaluation via video conferencing via [employer]. Verbal consent obtained before beginning visit.   The following staff were present during this visit: Hanane Azevedo CMA/Srikanth Jules PA-C   Sushila Stark is a 45 year old /White female who presents for evaluation and treatment of: 1 month f/u.      pt presents today for 1 month follow up on abnormal Pelvic US 8/20/20.    pt had appt with Dr Townsend on 9/9/20 and is schd surgery for a complete hysterectomy 10/15/20.    mammo-schd for 12/11/20 @ 400pm    pt is requesting refills on Ambien and Ativan    UDS 8/20/20  Carlos 8/20/20    pt verbally agreed to her controlled substance contract    pt cont to smoke and not ready to quit       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 --  --    Abnormal PAP Smear --  --    Allergic rhinitis due to allergen 02/14/2017 --    Anxiety --  --    Anxiety disorder 02/14/2017 --    Depression --  --    Essential hypertension 11/01/2018 --    Hyperlipidemia --  --    Hypertension --  --    Insomnia, unspecified 04/13/2020 --    Kidney stone --  --    Migraine Headaches --  --    Nicotine dependence 09/24/2018 --    Streptococcal Sore Throat --  --    Ureterolithiasis 08/18/2016 --    Vitamin D deficiency 08/20/2020 --          Past Surgical History  Procedure Name Date Notes   Cholecystectomy --  --     Conization --  --    Cystoscopy with bilateral retrograde pyelography --  --          Medication List  Name Date Started Instructions   allopurinol 100 mg oral tablet 08/21/2020 take 1 tablet (100 mg) by oral route once daily for 30 days   Ambien 10 mg oral tablet 09/21/2020 take 1 tablet (10 mg) by oral route once daily at bedtime for 30 days   bupropion HCl 300 mg oral tablet extended release 24 hr 08/31/2020 TAKE ONE TABLET BY MOUTH ONCE DAILY   diclofenac sodium 50 mg oral tablet,delayed release (DR/EC) 05/05/2020 take 1 tablet (50 mg) by oral route 2 times per day with food   ergocalciferol (vitamin D2) 1,250 mcg (50,000 unit) oral capsule 08/21/2020 take 1 capsule by oral route once week.   escitalopram oxalate 20 mg oral tablet 08/31/2020 TAKE ONE TABLET BY MOUTH ONCE DAILY   ferrous sulfate 325 mg (65 mg iron) oral tablet 06/29/2020 TAKE ONE TABLET BY MOUTH TWICE DAILY   furosemide 40 mg oral tablet 08/31/2020 TAKE 1 OR 2 TABLETS BY MOUTH EVERY DAY   lisinopril 10 mg oral tablet 08/31/2020 TAKE ONE TABLET BY MOUTH ONCE DAILY   montelukast 10 mg oral tablet 06/29/2020 TAKE ONE TABLET BY MOUTH ONCE DAILY IN THE EVENING   potassium chloride 10 mEq oral tablet extended release 06/29/2020 TAKE TWO TABLETS BY MOUTH EVERY DAY WITH food   Singulair 10 mg oral tablet 05/05/2020 take 1 tablet (10 mg) by oral route once daily in the evening   Trintellix 10 mg oral tablet 08/20/2020 take 1 tablet (10 mg) by oral route once daily at the same time each day   Ventolin HFA 90 mcg/actuation inhalation HFA aerosol inhaler 11/05/2019 inhale 1 - 2 puffs (90 - 180 mcg) by inhalation route every 4-6 hours as needed   Wellbutrin  mg oral tablet extended release 24 hr 05/28/2020 take 1 tablet (300 mg) by oral route once daily for 30 days   Xanax 0.5 mg oral tablet 09/21/2020 take 1 tablet (0.5 mg) by oral route 3 times per day PRN anxiety for 30 days         Allergy List  Allergen Name Date Reaction Notes   Codeine Sulfate  --  --  --    PENICILLINS --  --  --          Family Medical History  Disease Name Relative/Age Notes   Bladder Neoplasm, Malignant  --    DM Type II  --    Hypertension Mother/   --          Reproductive History  Menstrual   Age Menarche: 14   Pregnancy Summary   Total Pregnancies: 0 Full Term: 0 Premature: 0   Ab Induced: 0 Ab Spontaneous: 0 Ectopics: 0   Multiples: 0 Livin         Social History  Finding Status Start/Stop Quantity Notes   Active but no formal exercise --  --/-- --  --    Alcohol Current some day --/-- --  --     --  --/-- --   name was Sushila Levy.   Homemaker --  --/-- --  --    No known infection risk --  --/-- --  --    Tobacco Current every day 20/-- 1PPD 2018 - still smoking 2018 - 1ppd Stopped smoking at age 36 and started smoking again as of 8/10/16.         Immunizations  NameDate Admin Mfg Trade Name Lot Number Route Inj VIS Given VIS Publication   Tdap2018 SKB BOOSTRIX  IM  2018   Comments: tolerated well. NDC [id number]         Review of Systems  · Constitutional  o Denies  o : fever, fatigue, weight loss, weight gain  · Cardiovascular  o Denies  o : pedal edema, claudication, chest pressure, palpitations  · Respiratory  o Denies  o : shortness of breath, wheezing, cough, hemoptysis, dyspnea on exertion  · Gastrointestinal  o Denies  o : nausea, vomiting, diarrhea, constipation, abdominal pain      Physical Examination  · Constitutional  o Appearance  o : overweight, well developed, alert, in no acute distress  · Head and Face  o Head  o :   § Inspection  § : atraumatic, normocephalic  · Respiratory  o Respiratory Effort  o : breathing comfortably, no cough  · Skin and Subcutaneous Tissue  o General Inspection  o : no visible rash, no lesions  · Neurologic  o Mental Status Examination  o :   § Orientation  § : grossly oriented to person, place and time, no facial droop          Assessment  · Anxiety  disorder     300.00/F41.9  · Essential hypertension     401.9/I10  · Insomnia, unspecified     780.52/G47.00  · Nicotine dependence     305.1/F17.200  · Obesity     278.00/E66.9  · Vitamin D deficiency     268.9/E55.9  · Visit for screening mammogram     V76.12/Z12.31  · Elevated uric acid in blood     790.6/E79.0  · Depression     296.31      Plan  · Orders  o ACO-17: Screened for tobacco use AND received tobacco cessation intervention (4004F) - 305.1/F17.200 - 09/20/2020  o Screening Mammography; Bilateral 3D (53366, 27799, ) - V76.12/Z12.31 - 09/20/2020  o ACO-39: Current medications updated and reviewed () - - 09/20/2020  · Medications  o Ambien 10 mg oral tablet   SIG: take 1 tablet (10 mg) by oral route once daily at bedtime for 30 days   DISP: (30) tablets with 2 refills  Refilled on 09/21/2020     o Xanax 0.5 mg oral tablet   SIG: take 1 tablet (0.5 mg) by oral route 3 times per day PRN anxiety for 30 days   DISP: (30) tablets with 0 refills  Refilled on 09/21/2020     · Instructions  o Patient advised to monitor blood pressure (B/P) at home and journal readings. Patient informed that a B/P reading at home of more than 130/80 is considered hypertension. For readings greater ukcd751/90 or higher patient is advised to follow up in the office with readings for management. Patient advised to limit sodium intake.  o Avoid any electronic use for at least 30 minutes prior to bed time. Cell phone screens, tablets and TVs imitate daylight, so your brain can become confused on the time of day. No caffeine use in the late afternoon and evenings.  o *Form of nicotine being used: cigs  o Patient was strongly encouraged to discontinue use of any nicotine containing product or minimize the use of the product.  o Obtained a written consent for ANKIT query. Discussed the risk and benefits of the use of controlled substances with the patient, including the risk of tolerance and drug dependence. The patient has been  counseled on the need to have an exit strategy, including potentially discontinuing the use of controlled substances. ANKIT has or will be reviewed as soon as it becomes avaliable.  o See note for indication of controlled substance. Ankit and drug screen have been reviewed. Controlled substance agreement signed and scanned into chart. After discussion of risks and benefits of medication, I have determined patient is suitable for Rx while demonstrating the ability to safely follow and administer the medication plan. Patient understands the expectation that medication directions cannot be adjusted without a provider's written approval.   o Take all medications as prescribed/directed.  o Patient instructed/educated on their diet and exercise program.  o Patient was educated/instructed on their diagnosis, treatment and medications prior to discharge from the clinic today.  o Patient counseled to reduce calorie intake.  o Patient was instructed to exercise regularly.  o Discussed Covid-19 precautions including, but not limited to, social distancing, avoid touching your face, and hand washing.   · Disposition  o Call or Return if symptoms worsen or persist.  o F/U in 3 months.  o Care Transition            Electronically Signed by: Srikanth Jules PA-C -Author on September 21, 2020 11:56:42 AM

## 2021-05-14 VITALS — WEIGHT: 231.56 LBS | BODY MASS INDEX: 36.34 KG/M2 | TEMPERATURE: 97.2 F | HEIGHT: 67 IN

## 2021-05-14 VITALS
SYSTOLIC BLOOD PRESSURE: 165 MMHG | OXYGEN SATURATION: 98 % | DIASTOLIC BLOOD PRESSURE: 89 MMHG | HEIGHT: 67 IN | HEART RATE: 83 BPM

## 2021-05-14 NOTE — PROGRESS NOTES
Progress Note      Patient Name: Sushila Stark   Patient ID: [id number]   Sex: Female   YOB: 1975    Primary Care Provider: Srikanth Jules PA-C   Referring Provider: Srikanth Jules PA-C    Visit Date: December 22, 2020    Provider: Srikanth Jules PA-C   Location: [facility]   Location Address: [address] [place] [address], [address]  [place], [place]  [id number]   Location Phone: [phone]          Chief Complaint  · 3 month follow up      History Of Present Illness  Video Conferencing Visit  Sushila Stark is a 45 year old /White female who is presenting for evaluation via video conferencing via [employer]. Verbal consent obtained before beginning visit.   The following staff were present during this visit: Everton Ellis MA/Srikanth Jules PA-C   Sushila Stark is a 45 year old /White female who presents for evaluation and treatment of: 3 month follow up      Pt presents today for a 3 month follow up.     Pt had hysterectomy October 2020 by Dr. Townsend.     Pt notes she has been very emotional since surgery. Pt c/o depression and moodiness.     Pt also c/o lower back pain for the past 4 months. Pt takes Tylenol and Aleve and stated it does not help.     Labs-8/21/20  Mammo-8/14/17  Carlos-8/20/20  UDS-8/20/20  Flu- Refused    Pt is not taking her allopurinol for elevated uric acid levels         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 --  --    Abnormal PAP Smear --  --    Allergic rhinitis due to allergen 02/14/2017 --    Anxiety --  --    Anxiety disorder 02/14/2017 --    Depression --  --    Essential hypertension 11/01/2018 --    Hyperlipidemia --  --    Hypertension --  --    Insomnia, unspecified 04/13/2020 --    Kidney stone --  --    Migraine Headaches --  --    Nicotine dependence 09/24/2018 --    Streptococcal Sore Throat --  --    Ureterolithiasis 08/18/2016 --    Vitamin D deficiency 08/20/2020 --          Past Surgical  History  Procedure Name Date Notes   Cholecystectomy --  --    Conization --  --    Cystoscopy with bilateral retrograde pyelography --  --          Medication List  Name Date Started Instructions   Aleve 220 mg oral tablet  take 1 tablet (220 mg) by oral route every 12 hours as needed   Ambien 10 mg oral tablet 2020 take 1 tablet (10 mg) by oral route once daily at bedtime for 30 days   escitalopram oxalate 20 mg oral tablet 2020 TAKE ONE TABLET BY MOUTH ONCE DAILY   furosemide 40 mg oral tablet 10/04/2020 TAKE 1 OR 2 TABLETS BY MOUTH EVERY DAY   lisinopril 10 mg oral tablet 2020 TAKE ONE TABLET BY MOUTH ONCE DAILY   potassium chloride 10 mEq oral tablet extended release 2020 TAKE TWO TABLETS BY MOUTH EVERY DAY WITH food   Tylenol 325 mg oral tablet  take 1 tablet (325 mg) by oral route every 4 hours as needed   Xanax 0.5 mg oral tablet 2020 take 1 tablet (0.5 mg) by oral route 3 times per day PRN anxiety for 30 days         Allergy List  Allergen Name Date Reaction Notes   Codeine Sulfate --  --  --    PENICILLINS --  --  --        Allergies Reconciled  Family Medical History  Disease Name Relative/Age Notes   Bladder Neoplasm, Malignant  --    DM Type II  --    Hypertension Mother/   --          Reproductive History  Menstrual   Age Menarche: 14   Pregnancy Summary   Total Pregnancies: 0 Full Term: 0 Premature: 0   Ab Induced: 0 Ab Spontaneous: 0 Ectopics: 0   Multiples: 0 Livin         Social History  Finding Status Start/Stop Quantity Notes   Active but no formal exercise --  --/-- --  --    Alcohol Current some day --/-- --  --     --  --/-- --   name was Sushila Levy.   Homemaker --  --/-- --  --    No known infection risk --  --/-- --  --    Tobacco Current every day 20/-- 1PPD 2018 - still smoking 2018 - 1ppd Stopped smoking at age 36 and started smoking again as of 8/10/16.         Immunizations  NameDate Admin Mfg Trade Name Lot Number Route Inj  VIS Given VIS Publication   Tdap06/19/2018 SKB BOOSTRIX  IM  06/19/2018 02/24/2015   Comments: tolerated well. NDC [id number]         Review of Systems  · Constitutional  o Denies  o : fever, fatigue, weight loss, weight gain  · Cardiovascular  o Denies  o : lower extremity edema, claudication, chest pressure, palpitations  · Respiratory  o Denies  o : shortness of breath, wheezing, cough, hemoptysis, dyspnea on exertion  · Gastrointestinal  o Denies  o : nausea, vomiting, diarrhea, constipation, abdominal pain      Physical Examination  · Constitutional  o Appearance  o : overweight, well developed, alert, in no acute distress  · Head and Face  o Head  o :   § Inspection  § : atraumatic, normocephalic  · Respiratory  o Respiratory Effort  o : breathing comfortably, no cough  · Skin and Subcutaneous Tissue  o General Inspection  o : no visible rash, no lesions  · Neurologic  o Mental Status Examination  o :   § Orientation  § : grossly oriented to person, place and time, no facial droop     BACK - points to lower thoracic and upper lumbar spine           Assessment  · Anxiety disorder     300.00/F41.9  · Essential hypertension     401.9/I10  · Hyperlipidemia     272.4/E78.5  · Insomnia, unspecified     780.52/G47.00  · Lumbago     724.2/M54.5  · Nicotine dependence     305.1/F17.200  · Obesity     278.00/E66.9  · Polyarthralgia     719.49/M25.50  · Thoracic back pain     724.1/M54.6  · Need for influenza vaccination     V04.81/Z23  · Visit for screening mammogram     V76.12/Z12.31  · Depression     296.31      Plan  · Orders  o HTN/Lipid Panel (CMP, Lipid) [facility] (21431, 09267) - 401.9/I10 - 12/22/2020  o Uric Acid (Serum) (94133) - 401.9/I10 - 12/22/2020  o Lumbar Spine Complete [facility] (68426) - 724.2/M54.5 - 12/22/2020  o ACO-17: Screened for tobacco use AND received tobacco cessation intervention (4004F) - 305.1/F17.200 - 12/22/2020  o RA Profile 1 (Uric Acid, ASO, RF IgM, JAILENE, CRP) [facility] (82761, 17260, 69036,  37605, 47861) - 719.49/M25.50 - 12/22/2020  o Sed rate (17970) - 719.49/M25.50 - 12/22/2020  o Thoracic Spine (3 view) [facility] (70418) - 724.1/M54.6 - 12/22/2020  o ACO-14: Influenza immunization was not administered for reasons documented () - V04.81/Z23 - 12/22/2020   pt refused  o Screening Mammography; Bilateral 3D (94038, , 00842) - V76.12/Z12.31 - 12/22/2020  o CBC with Auto Diff [facility] (38745) - - 12/22/2020  o Thyroid Profile (19306, 55317, THYII) - - 12/22/2020  o Urinalysis with Reflex Microscopy ([facility]) (12224) - - 12/22/2020  o Vitamin D (25-Hydroxy) Level (72880) - - 12/22/2020  o ACO-39: Current medications updated and reviewed (, 1159F) - - 12/22/2020  o Uric Acid Serum [facility] (60042) - - 12/22/2020  · Medications  o Medications have been Reconciled  o Transition of Care or Provider Policy  · Instructions  o Patient advised to monitor blood pressure (B/P) at home and journal readings. Patient informed that a B/P reading at home of more than 130/80 is considered hypertension. For readings greater pslg766/90 or higher patient is advised to follow up in the office with readings for management. Patient advised to limit sodium intake.  o Patient was educated and given low cholesterol diet information.  o Recommended exercise program to assist with cholesterol, weight loss and overall health improvement.  o Advised that cheeses and other sources of dairy fats, animal fats, fast food, and the extras (candy, pastries, pies, doughnuts and cookies) all contain LDL raising nutrients. Advised to increase fruits, vegetables, whole grains, and to monitor portion sizes.   o Avoid any electronic use for at least 30 minutes prior to bed time. Cell phone screens, tablets and TVs imitate daylight, so your brain can become confused on the time of day. No caffeine use in the late afternoon and evenings.  o *Form of nicotine being used: cigs  o Patient was strongly encouraged to discontinue use of any nicotine  containing product or minimize the use of the product.  o Patient instructed/educated on their diet and exercise program.  o Patient was educated/instructed on their diagnosis, treatment and medications prior to discharge from the clinic today.  o Patient counseled to reduce calorie intake.  o Patient was instructed to exercise regularly.  o Discussed Covid-19 precautions including, but not limited to, social distancing, avoid touching your face, and hand washing.   · Disposition  o Call or Return if symptoms worsen or persist.  o f/u in clinic in 3 weeks            Electronically Signed by: Srikanth Jules PA-C -Author on December 23, 2020 11:34:42 AM

## 2021-05-14 NOTE — PROGRESS NOTES
Progress Note      Patient Name: Sushila Stark   Patient ID: [id number]   Sex: Female   YOB: 1975    Primary Care Provider: Srikanth Jules PA-C   Referring Provider: Srikanth Jules PA-C    Visit Date: January 12, 2021    Provider: Srikanth Jules PA-C   Location: [facility]   Location Address: [address] [place] [address], [address]  [place], [place]  [id number]   Location Phone: [phone]          Chief Complaint  · 3 week follow up      History Of Present Illness  Video Conferencing Visit  Sushila Stark is a 45 year old /White female who is presenting for evaluation via video conferencing via [employer]. Verbal consent obtained before beginning visit.   The following staff were present during this visit: Everton Ellis MA/Srikanth Jules PA-C   Sushila Stark is a 45 year old /White female who presents for evaluation and treatment of: 3 week follow up      Pt presents today for a 3 week follow up.     Pt c/o mid back pain for the past 9 months. Pt had Xray 1/5/21, Xray showed mild multilevel degenerative changes and mild focal kyphosis seen at T11-T12. Pt has MRI scheduled 1/27.    Pt is currently taking Aleve and Ibuprofen for pain and stated it does not help very much.  Pt has abn thoracic spine x-ray which she now needs a MRI of thoracic spine    Pain 6/10 at times with movement    Anxiety - controlled with PRN Xanax - pt lost a child, grieving    Insomnia - ambien PRN well controlled  Elevated LFTs - will need RUQ U/S scheduled  Avoid ETOH and Tylenol         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 --  --    Abnormal PAP Smear --  --    Allergic rhinitis due to allergen 02/14/2017 --    Anxiety --  --    Anxiety disorder 02/14/2017 --    Depression --  --    Essential hypertension 11/01/2018 --    Hyperlipidemia --  --    Hypertension --  --    Insomnia, unspecified 04/13/2020 --    Kidney stone --  --    Migraine Headaches --  --     Nicotine dependence 2018 --    Streptococcal Sore Throat --  --    Ureterolithiasis 2016 --    Vitamin D deficiency 2020 --          Past Surgical History  Procedure Name Date Notes   Cholecystectomy --  --    Conization --  --    Cystoscopy with bilateral retrograde pyelography --  --          Medication List  Name Date Started Instructions   Aleve 220 mg oral tablet  take 1 tablet (220 mg) by oral route every 12 hours as needed   Ambien 10 mg oral tablet 2020 take 1 tablet (10 mg) by oral route once daily at bedtime for 30 days   escitalopram oxalate 20 mg oral tablet 2020 TAKE ONE TABLET BY MOUTH ONCE DAILY   furosemide 40 mg oral tablet 10/04/2020 TAKE 1 OR 2 TABLETS BY MOUTH EVERY DAY   ibuprofen 200 mg oral capsule  take 1 capsule (200 mg) by oral route every 6 hours as needed   lisinopril 10 mg oral tablet 2020 TAKE ONE TABLET BY MOUTH ONCE DAILY   potassium chloride 10 mEq oral tablet extended release 2020 TAKE TWO TABLETS BY MOUTH EVERY DAY WITH food   Vitamin D2 1,250 mcg (50,000 unit) oral capsule 2021 take 1 capsule by oral route once a week   Xanax 0.5 mg oral tablet 2020 take 1 tablet (0.5 mg) by oral route 3 times per day PRN anxiety for 30 days         Allergy List  Allergen Name Date Reaction Notes   Codeine Sulfate --  --  --    PENICILLINS --  --  --        Allergies Reconciled  Family Medical History  Disease Name Relative/Age Notes   Bladder Neoplasm, Malignant  --    DM Type II  --    Hypertension Mother/   --          Reproductive History  Menstrual   Age Menarche: 14   Pregnancy Summary   Total Pregnancies: 0 Full Term: 0 Premature: 0   Ab Induced: 0 Ab Spontaneous: 0 Ectopics: 0   Multiples: 0 Livin         Social History  Finding Status Start/Stop Quantity Notes   Active but no formal exercise --  --/-- --  --    Alcohol Current some day --/-- --  --     --  --/-- --   name was Sushila Levy.   Homemaker --  --/-- --   --    No known infection risk --  --/-- --  --    Tobacco Current every day 20/-- 1PPD 06/19/2018 - still smoking 06/01/2018 - 1ppd Stopped smoking at age 36 and started smoking again as of 8/10/16.         Immunizations  NameDate Admin Mfg Trade Name Lot Number Route Inj VIS Given VIS Publication   Tdap06/19/2018 SKB BOOSTRIX  IM  06/19/2018 02/24/2015   Comments: tolerated well. NDC [id number]         Review of Systems  · Constitutional  o Denies  o : fever, fatigue, weight loss, weight gain  · Cardiovascular  o Denies  o : lower extremity edema, claudication, chest pressure, palpitations  · Respiratory  o Denies  o : shortness of breath, wheezing, cough, hemoptysis, dyspnea on exertion  · Gastrointestinal  o Denies  o : nausea, vomiting, diarrhea, constipation, abdominal pain      Physical Examination  · Constitutional  o Appearance  o : overweight, well developed, alert, in no acute distress  · Head and Face  o Head  o :   § Inspection  § : atraumatic, normocephalic  · Respiratory  o Respiratory Effort  o : breathing comfortably, no cough  · Skin and Subcutaneous Tissue  o General Inspection  o : no visible rash, no lesions  · Neurologic  o Mental Status Examination  o :   § Orientation  § : grossly oriented to person, place and time, no facial droop          Assessment  · Impaired fasting glucose     790.21/R73.01  · Nicotine dependence     305.1/F17.200  · Obesity     278.00/E66.9  · Thoracic back pain     724.1/M54.6  · Need for influenza vaccination     V04.81/Z23  · Mid back pain     724.5/M54.9  · Elevated LFTs     790.6/R79.89      Plan  · Orders  o ACO-17: Screened for tobacco use AND received tobacco cessation intervention (4004F) - 305.1/F17.200 - 01/12/2021  o ACO-14: Influenza immunization was not administered for reasons documented () - V04.81/Z23 - 01/12/2021   pt refused  o ANKIT Report (KASPR) - - 01/12/2021  o ACO-39: Current medications updated and reviewed (1800F, ) - -  01/12/2021  · Medications  o Ultram 50 mg oral tablet   SIG: take 1 tablet (50 mg) by oral route every 4-6 hours as needed for pain   DISP: (30) Tablet with 0 refills  Prescribed on 01/12/2021     o Medications have been Reconciled  o Transition of Care or Provider Policy  · Instructions  o Instructed patient to watch their diet and exercise.  o *Form of nicotine being used: cigs  o Patient was strongly encouraged to discontinue use of any nicotine containing product or minimize the use of the product.  o Obtained a written consent for ANKIT query. Discussed the risk and benefits of the use of controlled substances with the patient, including the risk of tolerance and drug dependence. The patient has been counseled on the need to have an exit strategy, including potentially discontinuing the use of controlled substances. ANKIT has or will be reviewed as soon as it becomes avaliable.  o See note for indication of controlled substance. Aknit and drug screen have been reviewed. Controlled substance agreement signed and scanned into chart. After discussion of risks and benefits of medication, I have determined patient is suitable for Rx while demonstrating the ability to safely follow and administer the medication plan. Patient understands the expectation that medication directions cannot be adjusted without a provider's written approval.   o Take all medications as prescribed/directed.  o Patient instructed/educated on their diet and exercise program.  o Patient was educated/instructed on their diagnosis, treatment and medications prior to discharge from the clinic today.  o Patient counseled to reduce calorie intake.  o Patient was instructed to exercise regularly.  o Discussed Covid-19 precautions including, but not limited to, social distancing, avoid touching your face, and hand washing.   · Disposition  o Call or Return if symptoms worsen or persist.  o F/U in 3 months.  o Care  Transition            Electronically Signed by: Srikanth Jules PA-C -Author on January 13, 2021 09:11:09 AM   Labs/EKG/Imaging Studies/Medications

## 2021-05-14 NOTE — PROGRESS NOTES
Progress Note      Patient Name: Sushila Stark   Patient ID: [id number]   Sex: Female   YOB: 1975    Primary Care Provider: Srikanth Jules PA-C   Referring Provider: Srikanth Jules PA-C    Visit Date: March 9, 2021    Provider: Srikanth Jules PA-C   Location: [facility]   Location Address: [address] [place] [address], [address]  [place], [place]  [id number]   Location Phone: [phone]          Chief Complaint  · Vomiting, Fatigue      History Of Present Illness  Video Conferencing Visit  Sushila Stark is a 45 year old /White female who is presenting for evaluation via video conferencing via [email or web address]. Verbal consent obtained before beginning visit.   The following staff were present during this visit: Everton Ellis MA/Srikanth Jules PA-C   Sushila Stark is a 45 year old /White female who presents for evaluation and treatment of: Vomiting, Fatigue      Pt presents today via Facebook for vomiting and fatigue.     Pt notes yesterday morning she felt nauseous and vomited on and off all day yesterday. Pt also noticed headaches, increased fatigue and dry eyes/nose.     Pt reports she is feeling better today.     Pt had appt with Gastro 2/4 and was a no show.    Pt had n/v yesterday, HA, burning in nose, no fever, chills.  Lots of fatigue.  Worried about possible covid or stomach virus.  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 --  --    Abnormal PAP Smear --  --    Allergic rhinitis due to allergen 02/14/2017 --    Anxiety --  --    Anxiety disorder 02/14/2017 --    Depression --  --    Essential hypertension 11/01/2018 --    Hyperlipidemia --  --    Hypertension --  --    Impaired fasting glucose 01/13/2021 --    Insomnia, unspecified 04/13/2020 --    Kidney stone --  --    Migraine Headaches --  --    Nicotine dependence 09/24/2018 --    Streptococcal Sore Throat --  --     Ureterolithiasis 2016 --    Vitamin D deficiency 2020 --          Past Surgical History  Procedure Name Date Notes   Cholecystectomy --  --    Conization --  --    Cystoscopy with bilateral retrograde pyelography --  --          Medication List  Name Date Started Instructions   Aleve 220 mg oral tablet  take 1 tablet (220 mg) by oral route every 12 hours as needed   Ambien 10 mg oral tablet 2020 take 1 tablet (10 mg) by oral route once daily at bedtime for 30 days   escitalopram oxalate 20 mg oral tablet 2020 TAKE ONE TABLET BY MOUTH ONCE DAILY   furosemide 40 mg oral tablet 2021 TAKE 1 OR 2 TABLETS BY MOUTH EVERY DAY   ibuprofen 200 mg oral capsule  take 1 capsule (200 mg) by oral route every 6 hours as needed   lisinopril 10 mg oral tablet 2021 TAKE ONE TABLET BY MOUTH ONCE DAILY   potassium chloride 10 mEq oral tablet extended release 2020 TAKE TWO TABLETS BY MOUTH EVERY DAY WITH food   Ultram 50 mg oral tablet 2021 take 1 tablet (50 mg) by oral route every 4-6 hours as needed for pain   Vitamin D2 1,250 mcg (50,000 unit) oral capsule 2021 take 1 capsule by oral route once a week   Xanax 0.5 mg oral tablet 2021 take 1 tablet (0.5 mg) by oral route 3 times per day PRN anxiety for 30 days         Allergy List  Allergen Name Date Reaction Notes   Codeine Sulfate --  --  --    PENICILLINS --  --  --        Allergies Reconciled  Family Medical History  Disease Name Relative/Age Notes   Bladder Neoplasm, Malignant  --    DM Type II  --    Hypertension Mother/   --          Reproductive History  Menstrual   Age Menarche: 14   Pregnancy Summary   Total Pregnancies: 0 Full Term: 0 Premature: 0   Ab Induced: 0 Ab Spontaneous: 0 Ectopics: 0   Multiples: 0 Livin         Social History  Finding Status Start/Stop Quantity Notes   Active but no formal exercise --  --/-- --  --    Alcohol Current some day --/-- --  --     --  --/-- --   name was  Sushila Levy.   Homemaker --  --/-- --  --    No known infection risk --  --/-- --  --    Tobacco Current every day 20/-- 1PPD 06/19/2018 - still smoking 06/01/2018 - 1ppd Stopped smoking at age 36 and started smoking again as of 8/10/16.         Immunizations  NameDate Admin Mfg Trade Name Lot Number Route Inj VIS Given VIS Publication   Tdap06/19/2018 SKB BOOSTRIX  IM  06/19/2018 02/24/2015   Comments: tolerated well. NDC [id number]         Review of Systems  · Constitutional  o Admits  o : fatigue  o Denies  o : fever, weight loss, weight gain  · Cardiovascular  o Denies  o : lower extremity edema, claudication, chest pressure, palpitations  · Respiratory  o Denies  o : shortness of breath, wheezing, cough, hemoptysis, dyspnea on exertion  · Gastrointestinal  o Admits  o : vomiting  o Denies  o : nausea, diarrhea, constipation, abdominal pain      Physical Examination  · Constitutional  o Appearance  o : well-nourished, no acute distress  · Head and Face  o Head  o :   § Inspection  § : atraumatic, normocephalic  · Respiratory  o Respiratory Effort  o : breathing comfortably, no cough  · Skin and Subcutaneous Tissue  o General Inspection  o : no visible rash, no lesions  · Neurologic  o Mental Status Examination  o :   § Orientation  § : grossly oriented to person, place and time, no facial droop          Assessment  · Exposure to COVID-19 virus     V01.79/Z20.828  · Fatigue     780.79/R53.83  · Headache     784.0/R51  · Nicotine dependence     305.1/F17.200  · Nausea & vomiting     787.01/R11.2  · Dry nose     478.19/J34.89  · Dry eye     375.15/H04.129      Plan  · Orders  o [facility] NCOV2 (send-out) (48545) - V01.79/Z20.828 - 03/10/2021  o ACO-17: Screened for tobacco use AND received tobacco cessation intervention (4004F) - 305.1/F17.200 - 03/10/2021  o ACO-39: Current medications updated and reviewed (, 1159F) - - 03/09/2021  · Medications  o Medications have been  Reconciled  o Transition of Care or Provider Policy  · Instructions  o *Form of nicotine being used: cigs  o Patient was strongly encouraged to discontinue use of any nicotine containing product or minimize the use of the product.  o Take all medications as prescribed/directed.  o Rest. Increase Fluids.  o Patient was educated/instructed on their diagnosis, treatment and medications prior to discharge from the clinic today.  o Patient counseled to stop smoking.  o Discussed Covid-19 precautions including, but not limited to, social distancing, avoid touching your face, and hand washing.   · Disposition  o Call or Return if symptoms worsen or persist.  o Care Transition            Electronically Signed by: Srikanth Jules PA-C -Author on March 10, 2021 03:00:42 PM

## 2021-05-15 VITALS
TEMPERATURE: 97.8 F | HEART RATE: 80 BPM | HEIGHT: 67 IN | OXYGEN SATURATION: 99 % | SYSTOLIC BLOOD PRESSURE: 144 MMHG | DIASTOLIC BLOOD PRESSURE: 98 MMHG

## 2021-05-15 VITALS
OXYGEN SATURATION: 97 % | HEIGHT: 67 IN | WEIGHT: 231.12 LBS | DIASTOLIC BLOOD PRESSURE: 88 MMHG | SYSTOLIC BLOOD PRESSURE: 134 MMHG | HEART RATE: 88 BPM | BODY MASS INDEX: 36.27 KG/M2 | TEMPERATURE: 97.6 F

## 2021-05-15 VITALS
BODY MASS INDEX: 36.27 KG/M2 | HEIGHT: 67 IN | SYSTOLIC BLOOD PRESSURE: 152 MMHG | WEIGHT: 231.12 LBS | DIASTOLIC BLOOD PRESSURE: 90 MMHG | OXYGEN SATURATION: 98 % | HEART RATE: 76 BPM

## 2021-05-15 VITALS
WEIGHT: 229.5 LBS | SYSTOLIC BLOOD PRESSURE: 114 MMHG | HEIGHT: 67 IN | DIASTOLIC BLOOD PRESSURE: 62 MMHG | BODY MASS INDEX: 36.02 KG/M2 | OXYGEN SATURATION: 99 % | HEART RATE: 86 BPM

## 2021-05-16 VITALS
WEIGHT: 232 LBS | OXYGEN SATURATION: 100 % | SYSTOLIC BLOOD PRESSURE: 150 MMHG | HEART RATE: 81 BPM | DIASTOLIC BLOOD PRESSURE: 90 MMHG

## 2021-05-16 VITALS
DIASTOLIC BLOOD PRESSURE: 82 MMHG | HEART RATE: 84 BPM | HEIGHT: 67 IN | OXYGEN SATURATION: 98 % | WEIGHT: 226 LBS | BODY MASS INDEX: 35.47 KG/M2 | SYSTOLIC BLOOD PRESSURE: 138 MMHG

## 2021-05-16 VITALS
SYSTOLIC BLOOD PRESSURE: 142 MMHG | DIASTOLIC BLOOD PRESSURE: 90 MMHG | HEIGHT: 67 IN | BODY MASS INDEX: 35.79 KG/M2 | TEMPERATURE: 97.9 F | OXYGEN SATURATION: 97 % | RESPIRATION RATE: 18 BRPM | WEIGHT: 228 LBS | HEART RATE: 101 BPM

## 2021-05-16 VITALS
HEIGHT: 67 IN | SYSTOLIC BLOOD PRESSURE: 140 MMHG | OXYGEN SATURATION: 96 % | TEMPERATURE: 97.7 F | DIASTOLIC BLOOD PRESSURE: 87 MMHG | HEART RATE: 82 BPM | BODY MASS INDEX: 36.14 KG/M2 | WEIGHT: 230.25 LBS

## 2021-05-16 VITALS
HEART RATE: 68 BPM | DIASTOLIC BLOOD PRESSURE: 82 MMHG | HEIGHT: 67 IN | RESPIRATION RATE: 20 BRPM | OXYGEN SATURATION: 97 % | TEMPERATURE: 97.8 F | SYSTOLIC BLOOD PRESSURE: 154 MMHG | BODY MASS INDEX: 36.1 KG/M2 | WEIGHT: 230 LBS

## 2021-05-16 VITALS
BODY MASS INDEX: 35.47 KG/M2 | OXYGEN SATURATION: 98 % | WEIGHT: 226 LBS | SYSTOLIC BLOOD PRESSURE: 130 MMHG | HEART RATE: 86 BPM | TEMPERATURE: 97.5 F | DIASTOLIC BLOOD PRESSURE: 86 MMHG | HEIGHT: 67 IN

## 2021-05-16 VITALS — HEART RATE: 86 BPM | OXYGEN SATURATION: 96 %

## 2021-05-28 ENCOUNTER — HOSPITAL ENCOUNTER (OUTPATIENT)
Dept: URGENT CARE | Facility: CLINIC | Age: 46
Discharge: HOME | End: 2021-05-28
Attending: PHYSICIAN ASSISTANT

## 2021-07-20 DIAGNOSIS — R60.9 WATER RETENTION: Primary | ICD-10-CM

## 2021-07-20 RX ORDER — FUROSEMIDE 40 MG/1
40 TABLET ORAL 2 TIMES DAILY
Qty: 60 TABLET | Refills: 0 | Status: SHIPPED | OUTPATIENT
Start: 2021-07-20 | End: 2021-09-01 | Stop reason: SDUPTHER

## 2021-07-20 NOTE — TELEPHONE ENCOUNTER
Caller: Sushila Stark    Relationship: Self    Best call back number: [phone]    Medication needed:   AMBIEN ONE -TWO TABLETS DAILY  Requested Prescriptions     Pending Prescriptions Disp Refills   • furosemide (LASIX) 40 MG tablet         When do you need the refill by: 07/20/2021    Does the patient have less than a 3 day supply:  [x] Yes  [] No    What is the patient's preferred pharmacy: [facility] [address] [facility] [phone] [facility] [phone] FX

## 2021-08-23 ENCOUNTER — TELEPHONE (OUTPATIENT)
Dept: FAMILY MEDICINE CLINIC | Facility: CLINIC | Age: 46
End: 2021-08-23

## 2021-08-23 DIAGNOSIS — F41.9 ANXIETY: Primary | ICD-10-CM

## 2021-08-23 RX ORDER — ESCITALOPRAM OXALATE 20 MG/1
20 TABLET ORAL DAILY
Qty: 30 TABLET | Refills: 3 | Status: SHIPPED | OUTPATIENT
Start: 2021-08-23 | End: 2021-09-01 | Stop reason: SDUPTHER

## 2021-08-23 NOTE — TELEPHONE ENCOUNTER
Incoming Refill Request      Medication requested (name and dose):   Escitalopram Oxalate (LEXAPRO PO)    Pharmacy where request should be sent:   [facility]     Additional details provided by patient:   Pharmacy was suppose to send a request couple weeks ago and shes been waiting for a refill     Best call back number:   [phone]    Does the patient have less than a 3 day supply:  [x] Yes  [] No    Claudia Ceja Rep  08/23/21, 12:17 EDT

## 2021-09-01 ENCOUNTER — OFFICE VISIT (OUTPATIENT)
Dept: FAMILY MEDICINE CLINIC | Facility: CLINIC | Age: 46
End: 2021-09-01

## 2021-09-01 ENCOUNTER — TELEPHONE (OUTPATIENT)
Dept: FAMILY MEDICINE CLINIC | Facility: CLINIC | Age: 46
End: 2021-09-01

## 2021-09-01 ENCOUNTER — HOSPITAL ENCOUNTER (OUTPATIENT)
Dept: GENERAL RADIOLOGY | Facility: HOSPITAL | Age: 46
Discharge: HOME | End: 2021-09-01
Admitting: PHYSICIAN ASSISTANT

## 2021-09-01 VITALS — OXYGEN SATURATION: 97 % | HEART RATE: 83 BPM | TEMPERATURE: 97 F

## 2021-09-01 DIAGNOSIS — F41.9 ANXIETY: Chronic | ICD-10-CM

## 2021-09-01 DIAGNOSIS — I10 ESSENTIAL HYPERTENSION: Chronic | ICD-10-CM

## 2021-09-01 DIAGNOSIS — R50.9 FEVER, UNSPECIFIED FEVER CAUSE: ICD-10-CM

## 2021-09-01 DIAGNOSIS — R60.9 WATER RETENTION: ICD-10-CM

## 2021-09-01 DIAGNOSIS — R05.9 COUGH: ICD-10-CM

## 2021-09-01 DIAGNOSIS — R05.9 COUGH: Primary | ICD-10-CM

## 2021-09-01 PROCEDURE — U0003 INFECTIOUS AGENT DETECTION BY NUCLEIC ACID (DNA OR RNA); SEVERE ACUTE RESPIRATORY SYNDROME CORONAVIRUS 2 (SARS-COV-2) (CORONAVIRUS DISEASE [COVID-19]), AMPLIFIED PROBE TECHNIQUE, MAKING USE OF HIGH THROUGHPUT TECHNOLOGIES AS DESCRIBED BY CMS-2020-01-R: HCPCS | Performed by: PHYSICIAN ASSISTANT

## 2021-09-01 PROCEDURE — C9803 HOPD COVID-19 SPEC COLLECT: HCPCS | Performed by: PHYSICIAN ASSISTANT

## 2021-09-01 PROCEDURE — 99214 OFFICE O/P EST MOD 30 MIN: CPT | Performed by: PHYSICIAN ASSISTANT

## 2021-09-01 PROCEDURE — 71046 X-RAY EXAM CHEST 2 VIEWS: CPT

## 2021-09-01 RX ORDER — ESCITALOPRAM OXALATE 20 MG/1
20 TABLET ORAL DAILY
Qty: 30 TABLET | Refills: 3 | Status: SHIPPED | OUTPATIENT
Start: 2021-09-01 | End: 2021-09-30 | Stop reason: SDUPTHER

## 2021-09-01 RX ORDER — FUROSEMIDE 40 MG/1
40 TABLET ORAL 2 TIMES DAILY
Qty: 60 TABLET | Refills: 0 | Status: SHIPPED | OUTPATIENT
Start: 2021-09-01 | End: 2021-09-30 | Stop reason: SDUPTHER

## 2021-09-01 NOTE — PROGRESS NOTES
"Chief Complaint  Cough (2 days), Fever, and Headache    Subjective          Sushila Stark presents to [facility]  History of Present Illness    Sushila Stark is a 46 y.o. female who presents today for cough, fever, headache    Pt notes symptoms first began two days ago. She c/o productive cough with clear sputum, chills, fever, body aches, headache and sinus drainage. Pt's temp in office today 97.0, she stated she had fever of 101.0 but has been taking Ibuprofen.     Pt notes she was at a horse show the past few days so she is unsure if she has had exposure to COVID-19 or not, she refuses vaccine.         Current Outpatient Medications:   •  ergocalciferol (ERGOCALCIFEROL) 1.25 MG (23978 UT) capsule, Vitamin D2 1,250 mcg (50,000 unit) oral capsule take 1 capsule by oral route once a week 1/5/2021  Active, Disp: , Rfl:   •  escitalopram (Lexapro) 20 MG tablet, Take 1 tablet by mouth Daily., Disp: 30 tablet, Rfl: 3  •  furosemide (LASIX) 40 MG tablet, Take 1 tablet by mouth 2 (Two) Times a Day., Disp: 60 tablet, Rfl: 0  •  lisinopril (PRINIVIL,ZESTRIL) 10 MG tablet, lisinopril 10 mg oral tablet TAKE ONE TABLET BY MOUTH ONCE DAILY 3/2/2021  Active, Disp: , Rfl:     Objective   Vital Signs:   Pulse 83   Temp 97 °F (36.1 °C)   SpO2 97%    Estimated body mass index is 36.65 kg/m² as calculated from the following:    Height as of 7/7/21: 170.2 cm (67\").    Weight as of 7/7/21: 106 kg (234 lb).   Physical Exam  Vitals and nursing note reviewed.   Constitutional:       Appearance: Normal appearance.   HENT:      Head: Normocephalic and atraumatic.   Cardiovascular:      Rate and Rhythm: Normal rate and regular rhythm.   Pulmonary:      Effort: Pulmonary effort is normal.      Breath sounds: Wheezing present.   Musculoskeletal:      Cervical back: Neck supple.   Neurological:      Mental Status: She is alert.   Psychiatric:         Mood and Affect: Mood normal.         Behavior: Behavior " normal.        Result Review :     Common labs    Common Labsle 10/16/20 10/16/20 10/16/20 1/5/21 1/5/21 1/5/21 1/5/21    0626 0627 1408 0746 0746 0746 0746   Glucose  113 (A)   114 (A)     BUN  13   12     Creatinine  0.68   0.68     Sodium  136   136     Potassium  3.5   4.2     Chloride  100   102     Calcium  9.3   9.0     Albumin     3.9     Total Bilirubin     0.34     Alkaline Phosphatase     74     AST (SGOT)     53 (A)     ALT (SGPT)     47 (A)     WBC 11.19 (A)   10.53      Hemoglobin 12.9  12.3 13.8      Hematocrit 39.5  37.0 41.6      Platelets 301   306      Total Cholesterol     207 (A)     Triglycerides     200 (A)     HDL Cholesterol     43     LDL Cholesterol      124 (A)     Hemoglobin A1C       5.8 (A)   Uric Acid      6.2    (A) Abnormal value       Comments are available for some flowsheets but are not being displayed.                       Assessment and Plan      Diagnoses and all orders for this visit:    1. Cough (Primary)  -     COVID-19,CEPHEID/EMBER/BDMAX,COR/QUINN/PAD/LARRY IN-HOUSE(OR EMERGENT/ADD-ON),NP SWAB IN TRANSPORT MEDIA 3-4 HR TAT, RT-PCR - Swab, Nasopharynx; Future  -     XR Chest PA & Lateral; Future  -     COVID-19,CEPHEID/EMBER/BDMAX,COR/QUINN/PAD/LARRY IN-HOUSE(OR EMERGENT/ADD-ON),NP SWAB IN TRANSPORT MEDIA 3-4 HR TAT, RT-PCR - Swab, Nasopharynx    2. Fever, unspecified fever cause  -     COVID-19,CEPHEID/EMBER/BDMAX,COR/QUINN/PAD/LARRY IN-HOUSE(OR EMERGENT/ADD-ON),NP SWAB IN TRANSPORT MEDIA 3-4 HR TAT, RT-PCR - Swab, Nasopharynx; Future  -     XR Chest PA & Lateral; Future  -     COVID-19,CEPHEID/EMBER/BDMAX,COR/QUINN/PAD/LARRY IN-HOUSE(OR EMERGENT/ADD-ON),NP SWAB IN TRANSPORT MEDIA 3-4 HR TAT, RT-PCR - Swab, Nasopharynx    3. Anxiety  Comments:  Stable with lexapro 20mg daily  Orders:  -     escitalopram (Lexapro) 20 MG tablet; Take 1 tablet by mouth Daily.  Dispense: 30 tablet; Refill: 3    4. Water retention  -     furosemide (LASIX) 40 MG tablet; Take 1 tablet by mouth 2 (Two) Times a  Day.  Dispense: 60 tablet; Refill: 0    5. Essential hypertension  Comments:  Controlled with lisinopril 10mg daily     Isolate until covid results    Follow Up     Return if symptoms worsen or fail to improve.    I have reviewed information obtained and documented by others and I have confirmed the accuracy of this documented note.     Patient was given instructions and counseling regarding her condition or for health maintenance advice. Please see specific information pulled into the AVS if appropriate.     HANNAH Olmstead

## 2021-09-02 ENCOUNTER — TELEPHONE (OUTPATIENT)
Dept: FAMILY MEDICINE CLINIC | Facility: CLINIC | Age: 46
End: 2021-09-02

## 2021-09-02 LAB — SARS-COV-2 RNA RESP QL NAA+PROBE: DETECTED

## 2021-09-30 ENCOUNTER — TELEMEDICINE (OUTPATIENT)
Dept: FAMILY MEDICINE CLINIC | Facility: CLINIC | Age: 46
End: 2021-09-30

## 2021-09-30 DIAGNOSIS — E55.9 VITAMIN D DEFICIENCY: Chronic | ICD-10-CM

## 2021-09-30 DIAGNOSIS — I10 ESSENTIAL HYPERTENSION: ICD-10-CM

## 2021-09-30 DIAGNOSIS — F41.9 ANXIETY: Chronic | ICD-10-CM

## 2021-09-30 DIAGNOSIS — F41.9 ANXIETY DISORDER, UNSPECIFIED TYPE: Chronic | ICD-10-CM

## 2021-09-30 DIAGNOSIS — R60.9 WATER RETENTION: ICD-10-CM

## 2021-09-30 DIAGNOSIS — G47.00 INSOMNIA, UNSPECIFIED TYPE: Primary | Chronic | ICD-10-CM

## 2021-09-30 PROBLEM — J30.9 ALLERGIC RHINITIS DUE TO ALLERGEN: Status: ACTIVE | Noted: 2017-02-14

## 2021-09-30 PROBLEM — M51.34 DEGENERATION OF THORACIC INTERVERTEBRAL DISC: Status: ACTIVE | Noted: 2021-04-27

## 2021-09-30 PROBLEM — F17.200 NICOTINE DEPENDENCE: Status: ACTIVE | Noted: 2018-09-24

## 2021-09-30 PROBLEM — N20.0 KIDNEY STONE: Status: ACTIVE | Noted: 2021-09-30

## 2021-09-30 PROBLEM — E78.5 HYPERLIPIDEMIA: Status: ACTIVE | Noted: 2021-09-30

## 2021-09-30 PROBLEM — R73.01 IMPAIRED FASTING GLUCOSE: Status: ACTIVE | Noted: 2021-01-13

## 2021-09-30 PROBLEM — F32.A DEPRESSION: Status: ACTIVE | Noted: 2021-09-30

## 2021-09-30 PROBLEM — Z79.4 ENCOUNTER FOR LONG-TERM (CURRENT) USE OF INSULIN: Status: ACTIVE | Noted: 2021-04-27

## 2021-09-30 PROCEDURE — 99214 OFFICE O/P EST MOD 30 MIN: CPT | Performed by: PHYSICIAN ASSISTANT

## 2021-09-30 RX ORDER — ZOLPIDEM TARTRATE 10 MG/1
10 TABLET ORAL NIGHTLY PRN
Qty: 30 TABLET | Refills: 0 | Status: SHIPPED | OUTPATIENT
Start: 2021-09-30 | End: 2021-12-27

## 2021-09-30 RX ORDER — ZOLPIDEM TARTRATE 10 MG/1
10 TABLET ORAL NIGHTLY PRN
COMMUNITY
End: 2021-09-30 | Stop reason: SDUPTHER

## 2021-09-30 RX ORDER — ALPRAZOLAM 0.5 MG/1
0.5 TABLET ORAL 2 TIMES DAILY PRN
Qty: 60 TABLET | Refills: 0 | Status: SHIPPED | OUTPATIENT
Start: 2021-09-30 | End: 2021-12-27

## 2021-09-30 RX ORDER — ERGOCALCIFEROL 1.25 MG/1
50000 CAPSULE ORAL WEEKLY
Qty: 13 CAPSULE | Refills: 1 | Status: SHIPPED | OUTPATIENT
Start: 2021-09-30 | End: 2022-04-29 | Stop reason: SDUPTHER

## 2021-09-30 RX ORDER — FUROSEMIDE 40 MG/1
40 TABLET ORAL 2 TIMES DAILY
Qty: 60 TABLET | Refills: 0 | Status: SHIPPED | OUTPATIENT
Start: 2021-09-30 | End: 2021-12-27 | Stop reason: SDUPTHER

## 2021-09-30 RX ORDER — ESCITALOPRAM OXALATE 20 MG/1
20 TABLET ORAL DAILY
Qty: 30 TABLET | Refills: 3 | Status: SHIPPED | OUTPATIENT
Start: 2021-09-30 | End: 2021-12-27 | Stop reason: SDDI

## 2021-09-30 RX ORDER — ALPRAZOLAM 0.5 MG/1
1 TABLET ORAL 3 TIMES DAILY PRN
COMMUNITY
End: 2021-09-30 | Stop reason: SDUPTHER

## 2021-09-30 RX ORDER — LISINOPRIL 10 MG/1
10 TABLET ORAL DAILY
Qty: 90 TABLET | Refills: 1 | Status: SHIPPED | OUTPATIENT
Start: 2021-09-30 | End: 2021-12-27 | Stop reason: SDUPTHER

## 2021-09-30 NOTE — PROGRESS NOTES
"You have chosen to receive care through a telehealth visit.  Do you consent to use a video/audio connection for your medical care today? Yes  Chief Complaint  Med Refill and Anxiety    Subjective          Sushila Stark presents to [facility]  History of Present Illness    Sushila Stark is a 46 y.o. female who presents today for medication refills and anxiety.     Pt offers no complaints, she is requesting refill of Ambien 10mg hs prn and Xanax 0.5mg tid prn.     Labs-2021  Carlos-21  UDS-2020- pt advised, she stated she will come in for UDS soon.     Pt refuses COVID-19 vaccine.     Pt had covid and having issues sleeping.  She lost a son and her GM  from covid.      Current Outpatient Medications:   •  ALPRAZolam (XANAX) 0.5 MG tablet, Take 1 tablet by mouth 2 (Two) Times a Day As Needed for Anxiety., Disp: 60 tablet, Rfl: 0  •  ergocalciferol (ERGOCALCIFEROL) 1.25 MG (16771 UT) capsule, Take 1 capsule by mouth 1 (One) Time Per Week., Disp: 13 capsule, Rfl: 1  •  escitalopram (Lexapro) 20 MG tablet, Take 1 tablet by mouth Daily., Disp: 30 tablet, Rfl: 3  •  furosemide (LASIX) 40 MG tablet, Take 1 tablet by mouth 2 (Two) Times a Day., Disp: 60 tablet, Rfl: 0  •  lisinopril (PRINIVIL,ZESTRIL) 10 MG tablet, Take 1 tablet by mouth Daily., Disp: 90 tablet, Rfl: 1  •  zolpidem (Ambien) 10 MG tablet, Take 1 tablet by mouth At Night As Needed for Sleep., Disp: 30 tablet, Rfl: 0    Objective      Vital Signs:   There were no vitals taken for this visit.   Estimated body mass index is 36.65 kg/m² as calculated from the following:    Height as of 21: 170.2 cm (67\").    Weight as of 21: 106 kg (234 lb).     Physical Exam  Constitutional:       Appearance: Normal appearance. She is obese.   HENT:      Head: Normocephalic.   Skin:     Comments: Normal appearing   Neurological:      Mental Status: She is alert.   Psychiatric:         Mood and Affect: Mood normal.         " Behavior: Behavior normal.        Result Review :     Common labs    Common Labsle 10/16/20 10/16/20 10/16/20 1/5/21 1/5/21 1/5/21 1/5/21    0626 0627 1408 0746 0746 0746 0746   Glucose  113 (A)   114 (A)     BUN  13   12     Creatinine  0.68   0.68     Sodium  136   136     Potassium  3.5   4.2     Chloride  100   102     Calcium  9.3   9.0     Albumin     3.9     Total Bilirubin     0.34     Alkaline Phosphatase     74     AST (SGOT)     53 (A)     ALT (SGPT)     47 (A)     WBC 11.19 (A)   10.53      Hemoglobin 12.9  12.3 13.8      Hematocrit 39.5  37.0 41.6      Platelets 301   306      Total Cholesterol     207 (A)     Triglycerides     200 (A)     HDL Cholesterol     43     LDL Cholesterol      124 (A)     Hemoglobin A1C       5.8 (A)   Uric Acid      6.2    (A) Abnormal value       Comments are available for some flowsheets but are not being displayed.                       Assessment and Plan      Diagnoses and all orders for this visit:    1. Insomnia, unspecified type (Primary)  Comments:  Ambien PRN nightly, stable  Orders:  -     zolpidem (Ambien) 10 MG tablet; Take 1 tablet by mouth At Night As Needed for Sleep.  Dispense: 30 tablet; Refill: 0    2. Anxiety disorder, unspecified type  Comments:  Stable with xanax and lexapro  Orders:  -     ALPRAZolam (XANAX) 0.5 MG tablet; Take 1 tablet by mouth 2 (Two) Times a Day As Needed for Anxiety.  Dispense: 60 tablet; Refill: 0    3. Anxiety  Comments:  Stable with lexapro 20mg daily  Orders:  -     escitalopram (Lexapro) 20 MG tablet; Take 1 tablet by mouth Daily.  Dispense: 30 tablet; Refill: 3    4. Water retention  -     furosemide (LASIX) 40 MG tablet; Take 1 tablet by mouth 2 (Two) Times a Day.  Dispense: 60 tablet; Refill: 0    5. Essential hypertension  -     furosemide (LASIX) 40 MG tablet; Take 1 tablet by mouth 2 (Two) Times a Day.  Dispense: 60 tablet; Refill: 0  -     lisinopril (PRINIVIL,ZESTRIL) 10 MG tablet; Take 1 tablet by mouth Daily.   Dispense: 90 tablet; Refill: 1    6. Vitamin D deficiency  Comments:  Stable with vit D 50,000 IU weekly  Orders:  -     ergocalciferol (ERGOCALCIFEROL) 1.25 MG (30186 UT) capsule; Take 1 capsule by mouth 1 (One) Time Per Week.  Dispense: 13 capsule; Refill: 1       Follow Up     Return in about 3 months (around 12/30/2021).    I have reviewed information obtained and documented by others and I have confirmed the accuracy of this documented note.     Patient was given instructions and counseling regarding her condition or for health maintenance advice. Please see specific information pulled into the AVS if appropriate.     HANNAH Olmstead

## 2021-12-27 ENCOUNTER — TELEMEDICINE (OUTPATIENT)
Dept: FAMILY MEDICINE CLINIC | Facility: CLINIC | Age: 46
End: 2021-12-27

## 2021-12-27 DIAGNOSIS — I10 ESSENTIAL HYPERTENSION: ICD-10-CM

## 2021-12-27 DIAGNOSIS — R60.9 EDEMA, UNSPECIFIED TYPE: Primary | ICD-10-CM

## 2021-12-27 DIAGNOSIS — R60.9 WATER RETENTION: ICD-10-CM

## 2021-12-27 DIAGNOSIS — R41.840 ATTENTION DISTURBANCE: ICD-10-CM

## 2021-12-27 PROCEDURE — 99213 OFFICE O/P EST LOW 20 MIN: CPT | Performed by: PHYSICIAN ASSISTANT

## 2021-12-27 RX ORDER — LISINOPRIL 10 MG/1
10 TABLET ORAL DAILY
Qty: 90 TABLET | Refills: 1 | Status: SHIPPED | OUTPATIENT
Start: 2021-12-27 | End: 2022-06-27 | Stop reason: SDUPTHER

## 2021-12-27 RX ORDER — POTASSIUM CHLORIDE 750 MG/1
10 TABLET, FILM COATED, EXTENDED RELEASE ORAL 2 TIMES DAILY
Qty: 30 TABLET | Refills: 5 | Status: SHIPPED | OUTPATIENT
Start: 2021-12-27 | End: 2022-08-09

## 2021-12-27 RX ORDER — FUROSEMIDE 40 MG/1
40 TABLET ORAL 2 TIMES DAILY
Qty: 60 TABLET | Refills: 5 | Status: SHIPPED | OUTPATIENT
Start: 2021-12-27 | End: 2022-08-26 | Stop reason: SDUPTHER

## 2021-12-27 NOTE — PROGRESS NOTES
Chief Complaint  Hyperlipidemia (3 month follow up), Hypertension, and Insomnia    Subjective          Sushila Stark presents to [facility]  History of Present Illness  Sushila Stark is a 46 y.o. female who presents today for a 3 month follow up HTN, HLD, Insomnia    Pt offers no complaints at this time.     Labs-1/2021  UDS-8/2020  Carlos-7/20/21    Pt refuses flu and COVID-19 vaccines.     Pt is doing well.   No CP, SOA, HA    Pt lost a child and struggles occ.    Past Medical History:   Diagnosis Date   • Allergic rhinitis due to allergen 02/14/2017   • Anal high risk HPV DNA test positive    • Anxiety    • Anxiety disorder 02/14/2017   • Depression    • Essential hypertension 11/01/2018   • Hx of abnormal cervical Pap smear    • Hyperlipidemia    • Hypertension    • Impaired fasting glucose 01/13/2021   • Insomnia, unspecified 04/13/2020   • Kidney stone    • Migraine headache    • Nicotine dependence 09/24/2018   • Streptococcal sore throat    • Ureterolithiasis 08/18/2016   • Vitamin D deficiency 08/20/2020      Family History   Problem Relation Age of Onset   • Hypertension Mother    • Other Other         BLADDER CANCER MALIGNANT   • Diabetes type II Other         MELLITUS      Past Surgical History:   Procedure Laterality Date   • CERVICAL CONE BIOPSY     • CHOLECYSTECTOMY     • CYSTOSCOPY      WITH BILATERAL RETROGRADE PYELOGRAPHY   • HYSTERECTOMY          Current Outpatient Medications:   •  ergocalciferol (ERGOCALCIFEROL) 1.25 MG (38490 UT) capsule, Take 1 capsule by mouth 1 (One) Time Per Week., Disp: 13 capsule, Rfl: 1  •  furosemide (LASIX) 40 MG tablet, Take 1 tablet by mouth 2 (Two) Times a Day., Disp: 60 tablet, Rfl: 5  •  lisinopril (PRINIVIL,ZESTRIL) 10 MG tablet, Take 1 tablet by mouth Daily., Disp: 90 tablet, Rfl: 1  •  potassium chloride (KLOR-CON) 10 MEQ CR tablet, Take 1 tablet by mouth 2 (Two) Times a Day., Disp: 30 tablet, Rfl: 5    Objective   Vital Signs:  "    There were no vitals taken for this visit.   Estimated body mass index is 36.65 kg/m² as calculated from the following:    Height as of 7/7/21: 170.2 cm (67\").    Weight as of 7/7/21: 106 kg (234 lb).     Physical Exam  Constitutional:       Appearance: Normal appearance.   HENT:      Head: Normocephalic.   Skin:     Comments: Normal appearing   Neurological:      Mental Status: She is alert.   Psychiatric:         Mood and Affect: Mood normal.         Behavior: Behavior normal.        Result Review :     Common labs    Common Labsle 1/5/21 1/5/21 1/5/21 1/5/21    0746 0746 0746 0746   Glucose  114 (A)     BUN  12     Creatinine  0.68     Sodium  136     Potassium  4.2     Chloride  102     Calcium  9.0     Albumin  3.9     Total Bilirubin  0.34     Alkaline Phosphatase  74     AST (SGOT)  53 (A)     ALT (SGPT)  47 (A)     WBC 10.53      Hemoglobin 13.8      Hematocrit 41.6      Platelets 306      Total Cholesterol  207 (A)     Triglycerides  200 (A)     HDL Cholesterol  43     LDL Cholesterol   124 (A)     Hemoglobin A1C    5.8 (A)   Uric Acid   6.2    (A) Abnormal value       Comments are available for some flowsheets but are not being displayed.                       Assessment and Plan      Diagnoses and all orders for this visit:    1. Edema, unspecified type (Primary)  -     potassium chloride (KLOR-CON) 10 MEQ CR tablet; Take 1 tablet by mouth 2 (Two) Times a Day.  Dispense: 30 tablet; Refill: 5    2. Water retention  -     furosemide (LASIX) 40 MG tablet; Take 1 tablet by mouth 2 (Two) Times a Day.  Dispense: 60 tablet; Refill: 5  -     potassium chloride (KLOR-CON) 10 MEQ CR tablet; Take 1 tablet by mouth 2 (Two) Times a Day.  Dispense: 30 tablet; Refill: 5    3. Essential hypertension  -     furosemide (LASIX) 40 MG tablet; Take 1 tablet by mouth 2 (Two) Times a Day.  Dispense: 60 tablet; Refill: 5  -     lisinopril (PRINIVIL,ZESTRIL) 10 MG tablet; Take 1 tablet by mouth Daily.  Dispense: 90 tablet; " Refill: 1  -     potassium chloride (KLOR-CON) 10 MEQ CR tablet; Take 1 tablet by mouth 2 (Two) Times a Day.  Dispense: 30 tablet; Refill: 5    4. Attention disturbance  -     Ambulatory Referral to Psychiatry       Pt is not taking ambien and xanax    Follow Up     Return in about 4 months (around 4/27/2022).    Patient was given instructions and counseling regarding her condition or for health maintenance advice. Please see specific information pulled into the AVS if appropriate.     I have reviewed information obtained and documented by others and I have confirmed the accuracy of this documented note.    HANNAH Olmstead

## 2022-01-10 ENCOUNTER — OFFICE VISIT (OUTPATIENT)
Dept: PSYCHIATRY | Facility: CLINIC | Age: 47
End: 2022-01-10

## 2022-01-10 VITALS
BODY MASS INDEX: 35.06 KG/M2 | WEIGHT: 223.4 LBS | HEIGHT: 67 IN | SYSTOLIC BLOOD PRESSURE: 165 MMHG | DIASTOLIC BLOOD PRESSURE: 100 MMHG

## 2022-01-10 DIAGNOSIS — F90.0 ADHD (ATTENTION DEFICIT HYPERACTIVITY DISORDER), INATTENTIVE TYPE: ICD-10-CM

## 2022-01-10 DIAGNOSIS — F33.1 MAJOR DEPRESSIVE DISORDER, RECURRENT EPISODE, MODERATE: Primary | ICD-10-CM

## 2022-01-10 DIAGNOSIS — Z51.81 MEDICATION MONITORING ENCOUNTER: ICD-10-CM

## 2022-01-10 DIAGNOSIS — F51.05 INSOMNIA DUE TO MENTAL DISORDER: ICD-10-CM

## 2022-01-10 DIAGNOSIS — F41.1 GENERALIZED ANXIETY DISORDER: ICD-10-CM

## 2022-01-10 PROCEDURE — 90792 PSYCH DIAG EVAL W/MED SRVCS: CPT | Performed by: NURSE PRACTITIONER

## 2022-01-10 RX ORDER — ATOMOXETINE 40 MG/1
40 CAPSULE ORAL DAILY
Qty: 30 CAPSULE | Refills: 0 | Status: SHIPPED | OUTPATIENT
Start: 2022-01-10 | End: 2022-02-10 | Stop reason: SDUPTHER

## 2022-01-10 NOTE — PROGRESS NOTES
Subjective   Sushila Stark is a 46 y.o. female who presents today for initial evaluation.   This provider is located at [address] [place] [address], [address] in [place], [place]. In-person visit consisting of the patient and I only. The patient is accepting of and agreeable to this appointment.     Chief Complaint:  Depression, anxiety, inattention    History of Present Illness: Patient reports she has had symptoms of depression and anxiety since approximately 2017, stemming from the passing of her child antepartum, patient was 6 months pregnant.  Was on Lexapro for approximately 4 years, benefit depression and anxiety, but stopped taking the medication proxy 2 months ago because she was feeling better.  Over the past 2 months, patient reports sadness is better.  Low energy and motivation.  Trouble sleeping.  Endorses feelings of anhedonia.  Appetite good.  Difficulty concentrating at times.  Endorses feelings of hopelessness.  Denies suicidal thoughts or homicidal thoughts.  Anxiety is high, with excessive worries.  Feels like something is going to go wrong all the time.  Feels overwhelmed with tasks at work.  Endorses physical symptoms of anxiety, including feeling tense and shaky.    ADHD: Patient reports issues with focus since grade school.  Made poor grades in school.  No failures or special classes needed.  No behavior issues.  No family history of ADHD.  Issues with focus have persisted into adulthood.  Patient reports becoming easily distracted at work.  Has been worse over the past year.  Has trouble paying attention when spoken to.  Low motivation.  Difficulty with organization.      Psychiatric Review of Systems: Patient denies any current or previous hallucinations/delusions, paranoia, manic symptoms or PTSD.    PHQ-9 Depression Screening  PHQ-9 Total Score: 20    Little interest or pleasure in doing things? 3   Feeling down, depressed, or hopeless? 2   Trouble falling or staying asleep, or sleeping too  much? 2   Feeling tired or having little energy? 3   Poor appetite or overeating? 2   Feeling bad about yourself - or that you are a failure or have let yourself or your family down? 2   Trouble concentrating on things, such as reading the newspaper or watching television? 3   Moving or speaking so slowly that other people could have noticed? Or the opposite - being so fidgety or restless that you have been moving around a lot more than usual? 3   Thoughts that you would be better off dead, or of hurting yourself in some way? 0   PHQ-9 Total Score 20     JACQUELINE-7  Feeling nervous, anxious or on edge: Nearly every day  Not being able to stop or control worrying: Nearly every day  Worrying too much about different things: Nearly every day  Trouble Relaxing: Nearly every day  Being so restless that it is hard to sit still: Nearly every day  Feeling afraid as if something awful might happen: Nearly every day  Becoming easily annoyed or irritable: More than half the days  JACQUELINE 7 Total Score: 20  If you checked any problems, how difficult have these problems made it for you to do your work, take care of things at home, or get along with other people: Extremely difficult      Past Surgical History:  Past Surgical History:   Procedure Laterality Date   • CERVICAL CONE BIOPSY     • CHOLECYSTECTOMY     • CYSTOSCOPY      WITH BILATERAL RETROGRADE PYELOGRAPHY   • HYSTERECTOMY         Problem List:  Patient Active Problem List   Diagnosis   • Anxiety   • Essential hypertension   • Depression   • Hyperlipidemia   • Impaired fasting glucose   • Insomnia, unspecified   • Kidney stone   • Nicotine dependence   • Ureterolithiasis   • Vitamin D deficiency   • Allergic rhinitis due to allergen   • Degeneration of thoracic intervertebral disc   • Encounter for long-term (current) use of insulin (HCC)       Allergy:   Allergies   Allergen Reactions   • Codeine Other (See Comments)     Chest pain.    • Penicillins Rash        Discontinued  Medications:  There are no discontinued medications.    Current Medications:   Current Outpatient Medications   Medication Sig Dispense Refill   • ergocalciferol (ERGOCALCIFEROL) 1.25 MG (25772 UT) capsule Take 1 capsule by mouth 1 (One) Time Per Week. 13 capsule 1   • furosemide (LASIX) 40 MG tablet Take 1 tablet by mouth 2 (Two) Times a Day. 60 tablet 5   • lisinopril (PRINIVIL,ZESTRIL) 10 MG tablet Take 1 tablet by mouth Daily. 90 tablet 1   • potassium chloride (KLOR-CON) 10 MEQ CR tablet Take 1 tablet by mouth 2 (Two) Times a Day. 30 tablet 5   • atomoxetine (Strattera) 40 MG capsule Take 1 capsule by mouth Daily for 30 days. 30 capsule 0     No current facility-administered medications for this visit.       Past Medical History:  Past Medical History:   Diagnosis Date   • Allergic rhinitis due to allergen 02/14/2017   • Anal high risk HPV DNA test positive    • Anxiety    • Anxiety disorder 02/14/2017   • Depression    • Essential hypertension 11/01/2018   • Hx of abnormal cervical Pap smear    • Hyperlipidemia    • Hypertension    • Impaired fasting glucose 01/13/2021   • Insomnia, unspecified 04/13/2020   • Kidney stone    • Migraine headache    • Nicotine dependence 09/24/2018   • Psychiatric illness    • Streptococcal sore throat    • Ureterolithiasis 08/18/2016   • Vitamin D deficiency 08/20/2020       Past Psychiatric History:  Began Treatment: 2017  Diagnoses: Depression, anxiety  Psychiatrist: Denies  Therapist: Denies  Admission History: Denies  Medication Trials: Lexapro, Paxil, Prozac, Wellbutrin, Celexa  Self Harm: Denies  Suicide Attempts: Denies    Substance Abuse History:   Types: Denies  Withdrawal Symptoms: Not applicable  Longest Period Sober: Not applicable  AA: Applicable    Social History:  Martial Status:  x4  Employed:  at [employer]  Kids: 1 daughter, age 15  House: Lives with daughter   History: Denies    Social History     Socioeconomic History   • Marital status:  "   Tobacco Use   • Smoking status: Current Every Day Smoker     Packs/day: 1.00     Years: 5.00     Pack years: 5.00     Types: Cigarettes   • Smokeless tobacco: Never Used   • Tobacco comment: STARTED AT AGE 22, STILL SMOKING, STOPPED SMOKING AT AGE 36 AND STARTED AGAIN AS OF/ /   Vaping Use   • Vaping Use: Former   • Substances: Nicotine, Flavoring   Substance and Sexual Activity   • Alcohol use: Yes     Comment: Rarely.    • Drug use: Yes     Types: Marijuana   • Sexual activity: Not Currently       Family History:   Suicide Attempts: Denies  Suicide Completions: Denies      Family History   Problem Relation Age of Onset   • Hypertension Mother    • Other Other         BLADDER CANCER MALIGNANT   • Diabetes type II Other         MELLITUS       Developmental History:   Born: [place]  Siblings: 1 sister  Childhood: Denies childhood abuse  High School: Graduate  College: Some    Access to Firearms: Denies    Mental Status Exam:     Appearance: good eye contact, normal street clothes, groomed, sitting in chair   Behavior: pleasant and cooperative  Motor: no abnormal  Speech: normal rhythm, rate, volume, tone, not hyperverbal, not pressured, normal prosidy  Mood: \" Okay\"  Affect: depressed  Thought Content: negative suicidal ideations, negative homicidal ideations, negative obsessions  Perceptions: negative auditory hallucinations, negative visual hallucinations, negative delusions, negative paranoia  Thought Process: goal directed, linear  Insight/Judgement: fair/fair  Cognition: grossly intact  Attention: intact  Orientation: person, place, time and situation  Memory: intact    Review of Systems:     Constitutional: Denies fatigue, night sweats  Eyes: Denies double vision, blurred vision  HENT: Denies vertigo, recent head injury  Cardiovascular: Denies chest pain, irregular heartbeats  Respiratory: Denies productive cough, shortness of breath  Gastrointestinal: Denies nausea, vomiting  Genitourinary: Denies " "dysuria, urinary retention  Integument: Denies hair growth change, new skin lesions  Neurologic: Denies altered mental status, seizures  Musculoskeletal: Denies joint swelling, limitation of motion  Endocrine: Denies cold intolerance, heat intolerance  Psychiatric: Admits anxiety, depression. Denies chace, post-traumatic stress disorder, obsessive compulsive disorder, psychosis.   Allergic-immunologic: Denies frequent illnesses      Vital Signs:   /100 Comment: Took in both arms right was 168/114 and left 165/100  Ht 170.2 cm (67\")   Wt 101 kg (223 lb 6.4 oz)   BMI 34.99 kg/m²      Lab Results:   Office Visit on 09/01/2021   Component Date Value Ref Range Status   • COVID19 09/01/2021 Detected* Not Detected - Ref. Range Final       EKG Results:  No orders to display       Imaging Results:  No Images in the past 120 days found..      Assessment/Plan   Diagnoses and all orders for this visit:    1. Major depressive disorder, recurrent episode, moderate (HCC) (Primary)    2. Generalized anxiety disorder    3. ADHD (attention deficit hyperactivity disorder), inattentive type  -     atomoxetine (Strattera) 40 MG capsule; Take 1 capsule by mouth Daily for 30 days.  Dispense: 30 capsule; Refill: 0    4. Insomnia due to mental disorder    5. Medication monitoring encounter  -     Urine Drug Screen - Urine, Clean Catch; Future      Patient presents today with symptoms of depression, anxiety and inattention.  Patient was previously on Lexapro, benefit depression anxiety.  Discussed possibility of starting back on Lexapro, but patient declines at this time, as she would like something specifically for ADHD.  Will send for ADHD testing.  Will start on atomoxetine to target inattention. 16 minutes of supportive psychotherapy with goal to strengthen defenses, promote problems solving, restore adaptive functioning and provide symptom relief. The therapeutic alliance was strengthened to encourage the patient to express " their thoughts and feelings. Esteem building was enhanced through praise, reassurance, normalizing and encouragement. Coping skills were enhanced to build distress tolerance skills and emotional regulation. Patient given education on medication side effects, diagnosis/illness and relapse symptoms. Plan to continue supportive psychotherapy in next appointment to provide symptom relief. 1 month    Visit Diagnoses:    ICD-10-CM ICD-9-CM   1. Major depressive disorder, recurrent episode, moderate (HCC)  F33.1 296.32   2. Generalized anxiety disorder  F41.1 300.02   3. ADHD (attention deficit hyperactivity disorder), inattentive type  F90.0 314.00   4. Insomnia due to mental disorder  F51.05 300.9     327.02   5. Medication monitoring encounter  Z51.81 V58.83       PLAN:  1. Safety: No acute safety concerns.   2. Therapy: Declines  3. Risk Assessment: Risk of self-harm acutely is moderate.  Risk factors include anxiety disorder, mood disorder, and recent psychosocial stressors (pandemic). Protective factors include no family history, denies access to guns/weapons, no present SI, no history of suicide attempts or self-harm in the past, minimal AODA, healthcare seeking, future orientation, willingness to engage in care.  Risk of self-harm chronically is also moderate, but could be further elevated in the event of treatment noncompliance and/or AODA.  4. Medications: Start atomoxetine 40 mg p.o. daily to target inattention. Risks, benefits, side effects discussed with patient including elevated heart rate, elevated blood pressure, irritability, insomnia, sexual dysfunction, appetite suppressing properties, psychosis.  After discussion of these risks and benefits, the patient voiced understanding and agreed to proceed. UDS ordered, Carlos reviewed.  5. Labs/studies: UDS  6. Follow-up: 1 month    Patient screened positive for depression based on a PHQ-9 score of 20 on 1/10/2022. Follow-up recommendations include: Suicide Risk  Assessment performed.         TREATMENT PLAN/GOALS: Continue supportive psychotherapy efforts and medications as indicated. Treatment and medication options discussed during today's visit. Patient ackowledged and verbally consented to continue with current treatment plan and was educated on the importance of compliance with treatment and follow-up appointments.      MEDICATION ISSUES:  ANKIT reviewed as expected.  Discussed medication options and treatment plan of prescribed medication as well as the risks, benefits, and side effects including potential falls, possible impaired driving and metabolic adversities among others. Patient is agreeable to call the office with any worsening of symptoms or onset of side effects. Patient is agreeable to call 911 or go to the nearest ER should he/she begin having SI/HI. No medication side effects or related complaints today.     MEDS ORDERED DURING VISIT:  New Medications Ordered This Visit   Medications   • atomoxetine (Strattera) 40 MG capsule     Sig: Take 1 capsule by mouth Daily for 30 days.     Dispense:  30 capsule     Refill:  0       Return in about 1 month (around 2/10/2022) for Next scheduled follow up.         This document has been electronically signed by SHIRAZ Vyas  January 10, 2022 08:52 EST      Part of this note may be an electronic transcription/translation of spoken language to printed text using the Dragon Dictation System.

## 2022-01-11 ENCOUNTER — PATIENT ROUNDING (BHMG ONLY) (OUTPATIENT)
Dept: PSYCHIATRY | Facility: CLINIC | Age: 47
End: 2022-01-11

## 2022-01-11 NOTE — PROGRESS NOTES
"January 11, 2022    Hello, may I speak with Sushila Stark?    My name is Gricel Paez      I am  with [facility]  [facility]  [address]  [place] [address] [phone].    Before we get started may I verify your date of birth? 1975    I am calling to officially welcome you to our practice and ask about your recent visit. Is this a good time to talk? yes    Tell me about your visit with us. What things went well?  \"Everything went well patient states she was very anxious about her visit and everyone was so nice to her.  Patient states everything went way better than she had expected.       We're always looking for ways to make our patients' experiences even better. Do you have recommendations on ways we may improve?  no    Overall were you satisfied with your first visit to our practice? yes       I appreciate you taking the time to speak with me today. Is there anything else I can do for you? no      Thank you, and have a great day.      "

## 2022-02-10 ENCOUNTER — OFFICE VISIT (OUTPATIENT)
Dept: PSYCHIATRY | Facility: CLINIC | Age: 47
End: 2022-02-10

## 2022-02-10 VITALS — BODY MASS INDEX: 33.93 KG/M2 | WEIGHT: 216.2 LBS | HEIGHT: 67 IN

## 2022-02-10 DIAGNOSIS — F41.1 GENERALIZED ANXIETY DISORDER: ICD-10-CM

## 2022-02-10 DIAGNOSIS — F33.1 MAJOR DEPRESSIVE DISORDER, RECURRENT EPISODE, MODERATE: Primary | ICD-10-CM

## 2022-02-10 DIAGNOSIS — F90.0 ADHD (ATTENTION DEFICIT HYPERACTIVITY DISORDER), INATTENTIVE TYPE: ICD-10-CM

## 2022-02-10 DIAGNOSIS — F51.05 INSOMNIA DUE TO MENTAL DISORDER: ICD-10-CM

## 2022-02-10 PROCEDURE — 99214 OFFICE O/P EST MOD 30 MIN: CPT | Performed by: NURSE PRACTITIONER

## 2022-02-10 PROCEDURE — 90833 PSYTX W PT W E/M 30 MIN: CPT | Performed by: NURSE PRACTITIONER

## 2022-02-10 RX ORDER — ATOMOXETINE 40 MG/1
40 CAPSULE ORAL DAILY
Qty: 30 CAPSULE | Refills: 2 | Status: SHIPPED | OUTPATIENT
Start: 2022-02-10 | End: 2022-03-10 | Stop reason: SDUPTHER

## 2022-02-10 RX ORDER — TRAZODONE HYDROCHLORIDE 50 MG/1
50 TABLET ORAL NIGHTLY
Qty: 30 TABLET | Refills: 2 | Status: SHIPPED | OUTPATIENT
Start: 2022-02-10 | End: 2022-03-10 | Stop reason: SDUPTHER

## 2022-02-10 NOTE — PROGRESS NOTES
"Subjective   Sushila Stark is a 46 y.o. female who presents today for follow up.   This provider is located at [address] [place] [address], [address] in [place], [place]. In-person visit consisting of the patient and I only. The patient is accepting of and agreeable to this appointment.     Chief Complaint:  Depression, anxiety, inattention    History of Present Illness: Depression over the past month- has been better- \"Way more motivated.\"   Sleep- trouble sleeping  Interest- Denies anhedonia  Guilt- Denies  Energy- better- doing more at work  Concentration- denies  Appetite- good  Psychomotor retardation/agitation- Denies  Suicidal thoughts or hopelessness- denies hopelessness, si or hi.     Anxiety over the past month- has been better  Anxious, nervous or worried on most days about a number of events or activities- less worries  No control over worries- able to manage better  Irritability- denies  Concentration- see above  Sleep- see above  Restlessness- denies  Tension in muscles- endorses    ADHD: focus and concentration has been better. \"Everything seems clearer.\" Able to complete tasks easier at work.     1/10/22 ADHD: Patient reports issues with focus since grade school.  Made poor grades in school.  No failures or special classes needed.  No behavior issues.  No family history of ADHD.  Issues with focus have persisted into adulthood.  Patient reports becoming easily distracted at work.  Has been worse over the past year.  Has trouble paying attention when spoken to.  Low motivation.  Difficulty with organization.      Psychiatric Review of Systems: Patient denies any current or previous hallucinations/delusions, paranoia, manic symptoms or PTSD.    PHQ-9 Depression Screening  PHQ-9 Total Score:      Little interest or pleasure in doing things?     Feeling down, depressed, or hopeless?     Trouble falling or staying asleep, or sleeping too much?     Feeling tired or having little energy?     Poor appetite or " overeating?     Feeling bad about yourself - or that you are a failure or have let yourself or your family down?     Trouble concentrating on things, such as reading the newspaper or watching television?     Moving or speaking so slowly that other people could have noticed? Or the opposite - being so fidgety or restless that you have been moving around a lot more than usual?     Thoughts that you would be better off dead, or of hurting yourself in some way?     PHQ-9 Total Score       JACQUELINE-7         Past Surgical History:  Past Surgical History:   Procedure Laterality Date   • CERVICAL CONE BIOPSY     • CHOLECYSTECTOMY     • CYSTOSCOPY      WITH BILATERAL RETROGRADE PYELOGRAPHY   • HYSTERECTOMY         Problem List:  Patient Active Problem List   Diagnosis   • Anxiety   • Essential hypertension   • Depression   • Hyperlipidemia   • Impaired fasting glucose   • Insomnia, unspecified   • Kidney stone   • Nicotine dependence   • Ureterolithiasis   • Vitamin D deficiency   • Allergic rhinitis due to allergen   • Degeneration of thoracic intervertebral disc   • Encounter for long-term (current) use of insulin ([facility])       Allergy:   Allergies   Allergen Reactions   • Codeine Other (See Comments)     Chest pain.    • Penicillins Rash        Discontinued Medications:  Medications Discontinued During This Encounter   Medication Reason   • atomoxetine (Strattera) 40 MG capsule Reorder       Current Medications:   Current Outpatient Medications   Medication Sig Dispense Refill   • atomoxetine (Strattera) 40 MG capsule Take 1 capsule by mouth Daily for 90 days. 30 capsule 2   • ergocalciferol (ERGOCALCIFEROL) 1.25 MG (40983 UT) capsule Take 1 capsule by mouth 1 (One) Time Per Week. 13 capsule 1   • furosemide (LASIX) 40 MG tablet Take 1 tablet by mouth 2 (Two) Times a Day. 60 tablet 5   • lisinopril (PRINIVIL,ZESTRIL) 10 MG tablet Take 1 tablet by mouth Daily. 90 tablet 1   • potassium chloride (KLOR-CON) 10 MEQ CR tablet Take 1  tablet by mouth 2 (Two) Times a Day. 30 tablet 5   • traZODone (DESYREL) 50 MG tablet Take 1 tablet by mouth Every Night for 90 days. 30 tablet 2     No current facility-administered medications for this visit.       Past Medical History:  Past Medical History:   Diagnosis Date   • Allergic rhinitis due to allergen 02/14/2017   • Anal high risk HPV DNA test positive    • Anxiety    • Anxiety disorder 02/14/2017   • Depression    • Essential hypertension 11/01/2018   • Hx of abnormal cervical Pap smear    • Hyperlipidemia    • Hypertension    • Impaired fasting glucose 01/13/2021   • Insomnia, unspecified 04/13/2020   • Kidney stone    • Migraine headache    • Nicotine dependence 09/24/2018   • Psychiatric illness    • Streptococcal sore throat    • Ureterolithiasis 08/18/2016   • Vitamin D deficiency 08/20/2020       Past Psychiatric History:  Began Treatment: 2017  Diagnoses: Depression, anxiety  Psychiatrist: Denies  Therapist: Denies  Admission History: Denies  Medication Trials: Lexapro, Paxil, Prozac, Wellbutrin, Celexa  Self Harm: Denies  Suicide Attempts: Denies    Substance Abuse History:   Types: Denies  Withdrawal Symptoms: Not applicable  Longest Period Sober: Not applicable  AA: Applicable    Social History:  Martial Status:  x4  Employed: [employer] at [employer]  Kids: 1 daughter, age 15  House: Lives with daughter   History: Denies    Social History     Socioeconomic History   • Marital status:    Tobacco Use   • Smoking status: Current Every Day Smoker     Packs/day: 1.00     Years: 5.00     Pack years: 5.00     Types: Cigarettes   • Smokeless tobacco: Never Used   • Tobacco comment: STARTED AT AGE 22, STILL SMOKING, STOPPED SMOKING AT AGE 36 AND STARTED AGAIN AS OF/ /   Vaping Use   • Vaping Use: Former   • Substances: Nicotine, Flavoring   Substance and Sexual Activity   • Alcohol use: Yes     Comment: Rarely.    • Drug use: Yes     Types: Marijuana   • Sexual activity: Not Currently  "      Family History:   Suicide Attempts: Denies  Suicide Completions: Denies      Family History   Problem Relation Age of Onset   • Hypertension Mother    • Other Other         BLADDER CANCER MALIGNANT   • Diabetes type II Other         MELLITUS       Developmental History:   Born: [place]  Siblings: 1 sister  Childhood: Denies childhood abuse  High School: Graduate  College: Some    Access to Firearms: Denies    Mental Status Exam:     Appearance: good eye contact, normal street clothes, groomed, sitting in chair   Behavior: pleasant and cooperative  Motor: no abnormal  Speech: normal rhythm, rate, volume, tone, not hyperverbal, not pressured, normal prosidy  Mood: \" Okay\"  Affect: depressed  Thought Content: negative suicidal ideations, negative homicidal ideations, negative obsessions  Perceptions: negative auditory hallucinations, negative visual hallucinations, negative delusions, negative paranoia  Thought Process: goal directed, linear  Insight/Judgement: fair/fair  Cognition: grossly intact  Attention: intact  Orientation: person, place, time and situation  Memory: intact    Review of Systems:     Constitutional: Denies fatigue, night sweats  Eyes: Denies double vision, blurred vision  HENT: Denies vertigo, recent head injury  Cardiovascular: Denies chest pain, irregular heartbeats  Respiratory: Denies productive cough, shortness of breath  Gastrointestinal: Denies nausea, vomiting  Genitourinary: Denies dysuria, urinary retention  Integument: Denies hair growth change, new skin lesions  Neurologic: Denies altered mental status, seizures  Musculoskeletal: Denies joint swelling, limitation of motion  Endocrine: Denies cold intolerance, heat intolerance  Psychiatric: Admits anxiety, depression. Denies chace, post-traumatic stress disorder, obsessive compulsive disorder, psychosis.   Allergic-immunologic: Denies frequent illnesses      Vital Signs:   Ht 170.2 cm (67\")   Wt 98.1 kg (216 lb 3.2 oz)   BMI " 33.86 kg/m²      Lab Results:   Office Visit on 09/01/2021   Component Date Value Ref Range Status   • COVID19 09/01/2021 Detected* Not Detected - Ref. Range Final       EKG Results:  No orders to display       Imaging Results:  No Images in the past 120 days found..      Assessment/Plan   Diagnoses and all orders for this visit:    1. Major depressive disorder, recurrent episode, moderate (HCC) (Primary)    2. Generalized anxiety disorder    3. ADHD (attention deficit hyperactivity disorder), inattentive type  -     atomoxetine (Strattera) 40 MG capsule; Take 1 capsule by mouth Daily for 90 days.  Dispense: 30 capsule; Refill: 2    4. Insomnia due to mental disorder  -     traZODone (DESYREL) 50 MG tablet; Take 1 tablet by mouth Every Night for 90 days.  Dispense: 30 tablet; Refill: 2      Will continue atomoxetine to target inattention. START trazodone to target insomnia. 16 minutes of supportive psychotherapy with goal to strengthen defenses, promote problems solving, restore adaptive functioning and provide symptom relief. The therapeutic alliance was strengthened to encourage the patient to express their thoughts and feelings. Esteem building was enhanced through praise, reassurance, normalizing and encouragement. Coping skills were enhanced to build distress tolerance skills and emotional regulation. Patient given education on medication side effects, diagnosis/illness and relapse symptoms. Plan to continue supportive psychotherapy in next appointment to provide symptom relief. 1 month    Visit Diagnoses:    ICD-10-CM ICD-9-CM   1. Major depressive disorder, recurrent episode, moderate (HCC)  F33.1 296.32   2. Generalized anxiety disorder  F41.1 300.02   3. ADHD (attention deficit hyperactivity disorder), inattentive type  F90.0 314.00   4. Insomnia due to mental disorder  F51.05 300.9     327.02       PLAN:  1. Safety: No acute safety concerns.   2. Therapy: Declines  3. Risk Assessment: Risk of self-harm  acutely is moderate.  Risk factors include anxiety disorder, mood disorder, and recent psychosocial stressors (pandemic). Protective factors include no family history, denies access to guns/weapons, no present SI, no history of suicide attempts or self-harm in the past, minimal AODA, healthcare seeking, future orientation, willingness to engage in care.  Risk of self-harm chronically is also moderate, but could be further elevated in the event of treatment noncompliance and/or AODA.  4. Medications: CONTINUE atomoxetine 40 mg p.o. daily to target inattention. Risks, benefits, side effects discussed with patient including elevated heart rate, elevated blood pressure, irritability, insomnia, sexual dysfunction, appetite suppressing properties, psychosis.  After discussion of these risks and benefits, the patient voiced understanding and agreed to proceed. START trazodone to target insomnia. Risks, benefits, side effects discussed with patient including GI upset, sedation, dizziness/falls risk, grogginess the following day, prolongation of the QTc interval.  After discussion of these risks and benefits, the patient voiced understanding and agreed to proceed.    5. Labs/studies: No labs/studies ordered  6. Follow-up: 1 month    Patient screened positive for depression based on a PHQ-9 score of 20 on 1/10/2022. Follow-up recommendations include: Suicide Risk Assessment performed.         TREATMENT PLAN/GOALS: Continue supportive psychotherapy efforts and medications as indicated. Treatment and medication options discussed during today's visit. Patient ackowledged and verbally consented to continue with current treatment plan and was educated on the importance of compliance with treatment and follow-up appointments.      MEDICATION ISSUES:  ANKIT reviewed as expected.  Discussed medication options and treatment plan of prescribed medication as well as the risks, benefits, and side effects including potential falls,  possible impaired driving and metabolic adversities among others. Patient is agreeable to call the office with any worsening of symptoms or onset of side effects. Patient is agreeable to call 911 or go to the nearest ER should he/she begin having SI/HI. No medication side effects or related complaints today.     MEDS ORDERED DURING VISIT:  New Medications Ordered This Visit   Medications   • traZODone (DESYREL) 50 MG tablet     Sig: Take 1 tablet by mouth Every Night for 90 days.     Dispense:  30 tablet     Refill:  2   • atomoxetine (Strattera) 40 MG capsule     Sig: Take 1 capsule by mouth Daily for 90 days.     Dispense:  30 capsule     Refill:  2       Return in about 1 month (around 3/10/2022) for Next scheduled follow up.         This document has been electronically signed by SHIRAZ Vyas  February 10, 2022 09:39 EST      Part of this note may be an electronic transcription/translation of spoken language to printed text using the Dragon Dictation System.

## 2022-03-10 ENCOUNTER — OFFICE VISIT (OUTPATIENT)
Dept: PSYCHIATRY | Facility: CLINIC | Age: 47
End: 2022-03-10

## 2022-03-10 VITALS
BODY MASS INDEX: 34.06 KG/M2 | SYSTOLIC BLOOD PRESSURE: 152 MMHG | DIASTOLIC BLOOD PRESSURE: 98 MMHG | HEIGHT: 67 IN | WEIGHT: 217 LBS

## 2022-03-10 DIAGNOSIS — F90.0 ADHD (ATTENTION DEFICIT HYPERACTIVITY DISORDER), INATTENTIVE TYPE: ICD-10-CM

## 2022-03-10 DIAGNOSIS — F41.1 GENERALIZED ANXIETY DISORDER: ICD-10-CM

## 2022-03-10 DIAGNOSIS — F51.05 INSOMNIA DUE TO MENTAL DISORDER: ICD-10-CM

## 2022-03-10 DIAGNOSIS — F33.1 MAJOR DEPRESSIVE DISORDER, RECURRENT EPISODE, MODERATE: Primary | ICD-10-CM

## 2022-03-10 PROCEDURE — 99214 OFFICE O/P EST MOD 30 MIN: CPT | Performed by: NURSE PRACTITIONER

## 2022-03-10 PROCEDURE — 90833 PSYTX W PT W E/M 30 MIN: CPT | Performed by: NURSE PRACTITIONER

## 2022-03-10 RX ORDER — TRAZODONE HYDROCHLORIDE 100 MG/1
100 TABLET ORAL NIGHTLY
Qty: 60 TABLET | Refills: 0 | Status: SHIPPED | OUTPATIENT
Start: 2022-03-10 | End: 2022-04-29

## 2022-03-10 RX ORDER — ATOMOXETINE 60 MG/1
60 CAPSULE ORAL DAILY
Qty: 30 CAPSULE | Refills: 0 | Status: SHIPPED | OUTPATIENT
Start: 2022-03-10 | End: 2022-04-09

## 2022-03-10 NOTE — PROGRESS NOTES
Subjective   Sushila Stark is a 46 y.o. female who presents today for follow up.   This provider is located at [address] [place] [address], [address] in [place], [place]. In-person visit consisting of the patient and I only. The patient is accepting of and agreeable to this appointment.     Chief Complaint:  Depression, anxiety, inattention    History of Present Illness: Depression over the past month- has been about the same  Sleep- trouble sleeping- trazodone not helping very much  Interest- Denies anhedonia  Guilt- Denies  Energy- good  Concentration- difficulty concentrating   Appetite- good  Psychomotor retardation/agitation- Denies  Suicidal thoughts or hopelessness- denies hopelessness, si or hi.     Anxiety over the past month- has been better  Anxious, nervous or worried on most days about a number of events or activities- less worries  No control over worries- able to manage better  Irritability- denies  Concentration- see above  Sleep- see above  Restlessness- denies  Tension in muscles- endorses    ADHD: focus and concentration have been more difficult over the past month. Easily distracted. Difficulty completing tasks at work. More forgetful.     1/10/22 ADHD: Patient reports issues with focus since grade school.  Made poor grades in school.  No failures or special classes needed.  No behavior issues.  No family history of ADHD.  Issues with focus have persisted into adulthood.  Patient reports becoming easily distracted at work.  Has been worse over the past year.  Has trouble paying attention when spoken to.  Low motivation.  Difficulty with organization.      Psychiatric Review of Systems: Patient denies any current or previous hallucinations/delusions, paranoia, manic symptoms or PTSD.    PHQ-9 Depression Screening  PHQ-9 Total Score:      Little interest or pleasure in doing things?     Feeling down, depressed, or hopeless?     Trouble falling or staying asleep, or sleeping too much?     Feeling tired  or having little energy?     Poor appetite or overeating?     Feeling bad about yourself - or that you are a failure or have let yourself or your family down?     Trouble concentrating on things, such as reading the newspaper or watching television?     Moving or speaking so slowly that other people could have noticed? Or the opposite - being so fidgety or restless that you have been moving around a lot more than usual?     Thoughts that you would be better off dead, or of hurting yourself in some way?     PHQ-9 Total Score       JACQUELINE-7         Past Surgical History:  Past Surgical History:   Procedure Laterality Date   • CERVICAL CONE BIOPSY     • CHOLECYSTECTOMY     • CYSTOSCOPY      WITH BILATERAL RETROGRADE PYELOGRAPHY   • HYSTERECTOMY         Problem List:  Patient Active Problem List   Diagnosis   • Anxiety   • Essential hypertension   • Depression   • Hyperlipidemia   • Impaired fasting glucose   • Insomnia, unspecified   • Kidney stone   • Nicotine dependence   • Ureterolithiasis   • Vitamin D deficiency   • Allergic rhinitis due to allergen   • Degeneration of thoracic intervertebral disc   • Encounter for long-term (current) use of insulin ([facility])       Allergy:   Allergies   Allergen Reactions   • Codeine Other (See Comments)     Chest pain.    • Penicillins Rash        Discontinued Medications:  Medications Discontinued During This Encounter   Medication Reason   • traZODone (DESYREL) 50 MG tablet Reorder   • atomoxetine (Strattera) 40 MG capsule Reorder       Current Medications:   Current Outpatient Medications   Medication Sig Dispense Refill   • atomoxetine (Strattera) 60 MG capsule Take 1 capsule by mouth Daily for 30 days. 30 capsule 0   • ergocalciferol (ERGOCALCIFEROL) 1.25 MG (77067 UT) capsule Take 1 capsule by mouth 1 (One) Time Per Week. 13 capsule 1   • furosemide (LASIX) 40 MG tablet Take 1 tablet by mouth 2 (Two) Times a Day. 60 tablet 5   • lisinopril (PRINIVIL,ZESTRIL) 10 MG tablet Take 1  tablet by mouth Daily. 90 tablet 1   • potassium chloride (KLOR-CON) 10 MEQ CR tablet Take 1 tablet by mouth 2 (Two) Times a Day. 30 tablet 5   • traZODone (DESYREL) 100 MG tablet Take 1 tablet by mouth Every Night for 60 days. 60 tablet 0     No current facility-administered medications for this visit.       Past Medical History:  Past Medical History:   Diagnosis Date   • Allergic rhinitis due to allergen 02/14/2017   • Anal high risk HPV DNA test positive    • Anxiety    • Anxiety disorder 02/14/2017   • Depression    • Essential hypertension 11/01/2018   • Hx of abnormal cervical Pap smear    • Hyperlipidemia    • Hypertension    • Impaired fasting glucose 01/13/2021   • Insomnia, unspecified 04/13/2020   • Kidney stone    • Migraine headache    • Nicotine dependence 09/24/2018   • Psychiatric illness    • Streptococcal sore throat    • Ureterolithiasis 08/18/2016   • Vitamin D deficiency 08/20/2020       Past Psychiatric History:  Began Treatment: 2017  Diagnoses: Depression, anxiety  Psychiatrist: Denies  Therapist: Denies  Admission History: Denies  Medication Trials: Lexapro, Paxil, Prozac, Wellbutrin, Celexa  Self Harm: Denies  Suicide Attempts: Denies    Substance Abuse History:   Types: Denies  Withdrawal Symptoms: Not applicable  Longest Period Sober: Not applicable  AA: Applicable    Social History:  Martial Status:  x4  Employed:  at [employer]  Kids: 1 daughter, age 15  House: Lives with daughter   History: Denies    Social History     Socioeconomic History   • Marital status:    Tobacco Use   • Smoking status: Current Every Day Smoker     Packs/day: 1.00     Years: 5.00     Pack years: 5.00     Types: Cigarettes   • Smokeless tobacco: Never Used   • Tobacco comment: STARTED AT AGE 22, STILL SMOKING, STOPPED SMOKING AT AGE 36 AND STARTED AGAIN AS OF/ /   Vaping Use   • Vaping Use: Former   • Substances: Nicotine, Flavoring   Substance and Sexual Activity   • Alcohol use: Yes  "    Comment: Rarely.    • Drug use: Yes     Types: Marijuana   • Sexual activity: Not Currently       Family History:   Suicide Attempts: Denies  Suicide Completions: Denies      Family History   Problem Relation Age of Onset   • Hypertension Mother    • Other Other         BLADDER CANCER MALIGNANT   • Diabetes type II Other         MELLITUS       Developmental History:   Born: [place]  Siblings: 1 sister  Childhood: Denies childhood abuse  High School: Graduate  College: Some    Access to Firearms: Denies    Mental Status Exam:     Appearance: good eye contact, normal street clothes, groomed, sitting in chair   Behavior: pleasant and cooperative  Motor: no abnormal  Speech: normal rhythm, rate, volume, tone, not hyperverbal, not pressured, normal prosidy  Mood: \"Alright\"  Affect: euthymic  Thought Content: negative suicidal ideations, negative homicidal ideations, negative obsessions  Perceptions: negative auditory hallucinations, negative visual hallucinations, negative delusions, negative paranoia  Thought Process: goal directed, linear  Insight/Judgement: fair/fair  Cognition: grossly intact  Attention: intact  Orientation: person, place, time and situation  Memory: intact    Review of Systems:     Constitutional: Denies fatigue, night sweats  Eyes: Denies double vision, blurred vision  HENT: Denies vertigo, recent head injury  Cardiovascular: Denies chest pain, irregular heartbeats  Respiratory: Denies productive cough, shortness of breath  Gastrointestinal: Denies nausea, vomiting  Genitourinary: Denies dysuria, urinary retention  Integument: Denies hair growth change, new skin lesions  Neurologic: Denies altered mental status, seizures  Musculoskeletal: Denies joint swelling, limitation of motion  Endocrine: Denies cold intolerance, heat intolerance  Psychiatric: Admits anxiety, depression. Denies chace, post-traumatic stress disorder, obsessive compulsive disorder, psychosis.   Allergic-immunologic: Denies " "frequent illnesses      Vital Signs:   /98   Ht 170.2 cm (67\")   Wt 98.4 kg (217 lb)   BMI 33.99 kg/m²      Lab Results:   Office Visit on 09/01/2021   Component Date Value Ref Range Status   • COVID19 09/01/2021 Detected (A) Not Detected - Ref. Range Final       EKG Results:  No orders to display       Imaging Results:  No Images in the past 120 days found..      Assessment/Plan   Diagnoses and all orders for this visit:    1. Major depressive disorder, recurrent episode, moderate (HCC) (Primary)    2. ADHD (attention deficit hyperactivity disorder), inattentive type  -     atomoxetine (Strattera) 60 MG capsule; Take 1 capsule by mouth Daily for 30 days.  Dispense: 30 capsule; Refill: 0    3. Insomnia due to mental disorder  -     traZODone (DESYREL) 100 MG tablet; Take 1 tablet by mouth Every Night for 60 days.  Dispense: 60 tablet; Refill: 0    4. Generalized anxiety disorder      Increase atomoxetine to target inattention. Increase trazodone to target insomnia.16 minutes of supportive psychotherapy with goal to strengthen defenses, promote problems solving, restore adaptive functioning and provide symptom relief. The therapeutic alliance was strengthened to encourage the patient to express their thoughts and feelings. Esteem building was enhanced through praise, reassurance, normalizing and encouragement. Coping skills were enhanced to build distress tolerance skills and emotional regulation. Patient given education on medication side effects, diagnosis/illness and relapse symptoms. Plan to continue supportive psychotherapy in next appointment to provide symptom relief. 1 month    Visit Diagnoses:    ICD-10-CM ICD-9-CM   1. Major depressive disorder, recurrent episode, moderate (HCC)  F33.1 296.32   2. ADHD (attention deficit hyperactivity disorder), inattentive type  F90.0 314.00   3. Insomnia due to mental disorder  F51.05 300.9     327.02   4. Generalized anxiety disorder  F41.1 300.02 "       PLAN:  1. Safety: No acute safety concerns.   2. Therapy: Declines  3. Risk Assessment: Risk of self-harm acutely is moderate.  Risk factors include anxiety disorder, mood disorder, and recent psychosocial stressors (pandemic). Protective factors include no family history, denies access to guns/weapons, no present SI, no history of suicide attempts or self-harm in the past, minimal AODA, healthcare seeking, future orientation, willingness to engage in care.  Risk of self-harm chronically is also moderate, but could be further elevated in the event of treatment noncompliance and/or AODA.  4. Medications: INCREASE atomoxetine 40 mg to 60mg p.o. daily to target inattention. Risks, benefits, side effects discussed with patient including elevated heart rate, elevated blood pressure, irritability, insomnia, sexual dysfunction, appetite suppressing properties, psychosis.  After discussion of these risks and benefits, the patient voiced understanding and agreed to proceed. INCREASE trazodone 50MG to 100mg to target insomnia. Risks, benefits, side effects discussed with patient including GI upset, sedation, dizziness/falls risk, grogginess the following day, prolongation of the QTc interval.  After discussion of these risks and benefits, the patient voiced understanding and agreed to proceed.    5. Labs/studies: No labs/studies ordered  6. Follow-up: 1 month    Patient screened positive for depression based on a PHQ-9 score of  on . Follow-up recommendations include: Suicide Risk Assessment performed.         TREATMENT PLAN/GOALS: Continue supportive psychotherapy efforts and medications as indicated. Treatment and medication options discussed during today's visit. Patient ackowledged and verbally consented to continue with current treatment plan and was educated on the importance of compliance with treatment and follow-up appointments.      MEDICATION ISSUES:  ANKIT reviewed as expected.  Discussed medication options  and treatment plan of prescribed medication as well as the risks, benefits, and side effects including potential falls, possible impaired driving and metabolic adversities among others. Patient is agreeable to call the office with any worsening of symptoms or onset of side effects. Patient is agreeable to call 911 or go to the nearest ER should he/she begin having SI/HI. No medication side effects or related complaints today.     MEDS ORDERED DURING VISIT:  New Medications Ordered This Visit   Medications   • atomoxetine (Strattera) 60 MG capsule     Sig: Take 1 capsule by mouth Daily for 30 days.     Dispense:  30 capsule     Refill:  0   • traZODone (DESYREL) 100 MG tablet     Sig: Take 1 tablet by mouth Every Night for 60 days.     Dispense:  60 tablet     Refill:  0       Return in about 1 month (around 4/10/2022) for Next scheduled follow up.         This document has been electronically signed by SHIRAZ Vyas  March 10, 2022 14:59 EST      Part of this note may be an electronic transcription/translation of spoken language to printed text using the Dragon Dictation System.

## 2022-04-29 ENCOUNTER — TELEMEDICINE (OUTPATIENT)
Dept: FAMILY MEDICINE CLINIC | Facility: CLINIC | Age: 47
End: 2022-04-29

## 2022-04-29 DIAGNOSIS — Z79.899 LONG-TERM USE OF HIGH-RISK MEDICATION: ICD-10-CM

## 2022-04-29 DIAGNOSIS — E78.00 PURE HYPERCHOLESTEROLEMIA: ICD-10-CM

## 2022-04-29 DIAGNOSIS — I10 ESSENTIAL HYPERTENSION: ICD-10-CM

## 2022-04-29 DIAGNOSIS — F41.9 ANXIETY: Primary | ICD-10-CM

## 2022-04-29 DIAGNOSIS — E66.09 CLASS 1 OBESITY DUE TO EXCESS CALORIES WITH SERIOUS COMORBIDITY AND BODY MASS INDEX (BMI) OF 32.0 TO 32.9 IN ADULT: Chronic | ICD-10-CM

## 2022-04-29 DIAGNOSIS — Z11.59 NEED FOR HEPATITIS C SCREENING TEST: ICD-10-CM

## 2022-04-29 DIAGNOSIS — G47.00 INSOMNIA, UNSPECIFIED TYPE: ICD-10-CM

## 2022-04-29 DIAGNOSIS — L98.9 FACIAL LESION: ICD-10-CM

## 2022-04-29 DIAGNOSIS — R73.01 IMPAIRED FASTING GLUCOSE: ICD-10-CM

## 2022-04-29 DIAGNOSIS — E55.9 VITAMIN D DEFICIENCY: Chronic | ICD-10-CM

## 2022-04-29 PROBLEM — E66.811 CLASS 1 OBESITY DUE TO EXCESS CALORIES WITH SERIOUS COMORBIDITY AND BODY MASS INDEX (BMI) OF 32.0 TO 32.9 IN ADULT: Chronic | Status: ACTIVE | Noted: 2022-04-29

## 2022-04-29 PROCEDURE — 99213 OFFICE O/P EST LOW 20 MIN: CPT | Performed by: PHYSICIAN ASSISTANT

## 2022-04-29 RX ORDER — LORAZEPAM 0.5 MG/1
0.5 TABLET ORAL EVERY 8 HOURS PRN
Qty: 30 TABLET | Refills: 0 | Status: SHIPPED | OUTPATIENT
Start: 2022-04-29 | End: 2022-08-09

## 2022-04-29 RX ORDER — ERGOCALCIFEROL 1.25 MG/1
50000 CAPSULE ORAL WEEKLY
Qty: 13 CAPSULE | Refills: 1 | Status: SHIPPED | OUTPATIENT
Start: 2022-04-29

## 2022-04-29 RX ORDER — ZOLPIDEM TARTRATE 10 MG/1
10 TABLET ORAL NIGHTLY PRN
Qty: 30 TABLET | Refills: 2 | Status: SHIPPED | OUTPATIENT
Start: 2022-04-29 | End: 2022-09-26

## 2022-04-29 NOTE — PROGRESS NOTES
Chief Complaint  Hypertension (4 month follow up/Lisinopril 10mg), Hyperlipidemia, Insomnia (trazodone), and Vitamin D Deficiency (Vitamin D)    Subjective          Sushila Stark presents to [facility]  History of Present Illness   Pt presents today for 4 month follow up on hld, htn, insomnia and Vit D.    Pt states she would like to discuss if she could go back on ambien and ativan.     Pt is currently taking trazodone and states it is not working for her.    Pt is currently seeing psych Gabriel Barakatr. Pt states she was started Strattera but had to stop taking it. Pt was a no show on 4/15.    Pt states she would like a referral to derm due to a mole on her lip.    Labs 1/5/21  A1C 5.8      Past Medical History:   Diagnosis Date   • Allergic rhinitis due to allergen 02/14/2017   • Anal high risk HPV DNA test positive    • Anxiety    • Anxiety disorder 02/14/2017   • Depression    • Essential hypertension 11/01/2018   • Hx of abnormal cervical Pap smear    • Hyperlipidemia    • Hypertension    • Impaired fasting glucose 01/13/2021   • Insomnia, unspecified 04/13/2020   • Kidney stone    • Migraine headache    • Nicotine dependence 09/24/2018   • Psychiatric illness    • Streptococcal sore throat    • Ureterolithiasis 08/18/2016   • Vitamin D deficiency 08/20/2020      Family History   Problem Relation Age of Onset   • Hypertension Mother    • Other Other         BLADDER CANCER MALIGNANT   • Diabetes type II Other         MELLITUS      Past Surgical History:   Procedure Laterality Date   • CERVICAL CONE BIOPSY     • CHOLECYSTECTOMY     • CYSTOSCOPY      WITH BILATERAL RETROGRADE PYELOGRAPHY   • HYSTERECTOMY          Current Outpatient Medications:   •  ergocalciferol (ERGOCALCIFEROL) 1.25 MG (22837 UT) capsule, Take 1 capsule by mouth 1 (One) Time Per Week., Disp: 13 capsule, Rfl: 1  •  furosemide (LASIX) 40 MG tablet, Take 1 tablet by mouth 2 (Two) Times a Day., Disp: 60 tablet,  "Rfl: 5  •  lisinopril (PRINIVIL,ZESTRIL) 10 MG tablet, Take 1 tablet by mouth Daily., Disp: 90 tablet, Rfl: 1  •  potassium chloride (KLOR-CON) 10 MEQ CR tablet, Take 1 tablet by mouth 2 (Two) Times a Day., Disp: 30 tablet, Rfl: 5  •  LORazepam (Ativan) 0.5 MG tablet, Take 1 tablet by mouth Every 8 (Eight) Hours As Needed for Anxiety., Disp: 30 tablet, Rfl: 0  •  zolpidem (AMBIEN) 10 MG tablet, Take 1 tablet by mouth At Night As Needed for Sleep., Disp: 30 tablet, Rfl: 2    Objective     Vital Signs:     There were no vitals taken for this visit.   Estimated body mass index is 33.99 kg/m² as calculated from the following:    Height as of 3/10/22: 170.2 cm (67\").    Weight as of 3/10/22: 98.4 kg (217 lb).     Wt Readings from Last 3 Encounters:   03/10/22 98.4 kg (217 lb)   02/10/22 98.1 kg (216 lb 3.2 oz)   01/10/22 101 kg (223 lb 6.4 oz)     BP Readings from Last 3 Encounters:   03/10/22 152/98   01/10/22 165/100   07/07/21 128/83     Physical Exam  Constitutional:       Appearance: Normal appearance. She is obese.   HENT:      Head: Normocephalic.   Skin:     Comments: Normal appearing   Neurological:      Mental Status: She is alert.   Psychiatric:         Mood and Affect: Mood normal.         Behavior: Behavior normal.        Result Review :                  Patient Care Team:  Marcellus Jules PA as PCP - General (Physician Assistant)    BMI is >= 30 and <= 34.9 (Class 1 obesity). The following options were offered after discussion: exercise counseling/recommendations and nutrition counseling/recommendations          Assessment and Plan      Diagnoses and all orders for this visit:    1. Anxiety (Primary)  -     LORazepam (Ativan) 0.5 MG tablet; Take 1 tablet by mouth Every 8 (Eight) Hours As Needed for Anxiety.  Dispense: 30 tablet; Refill: 0  -     TSH Rfx On Abnormal To Free T4; Future    2. Vitamin D deficiency  Comments:  Stable with vit D 50,000 IU weekly  Orders:  -     ergocalciferol (ERGOCALCIFEROL) 1.25 " MG (15455 UT) capsule; Take 1 capsule by mouth 1 (One) Time Per Week.  Dispense: 13 capsule; Refill: 1  -     Vitamin D 25 Hydroxy; Future    3. Insomnia, unspecified type  -     zolpidem (AMBIEN) 10 MG tablet; Take 1 tablet by mouth At Night As Needed for Sleep.  Dispense: 30 tablet; Refill: 2  -     CBC & Differential; Future  -     Comprehensive Metabolic Panel; Future  -     Lipid Panel; Future  -     TSH Rfx On Abnormal To Free T4; Future  -     Vitamin D 25 Hydroxy; Future  -     Urinalysis With Microscopic If Indicated (No Culture) - Urine, Clean Catch; Future    4. Facial lesion  -     Ambulatory Referral to Dermatology    5. Essential hypertension  -     CBC & Differential; Future  -     Comprehensive Metabolic Panel; Future  -     Lipid Panel; Future  -     TSH Rfx On Abnormal To Free T4; Future  -     Urinalysis With Microscopic If Indicated (No Culture) - Urine, Clean Catch; Future    6. Pure hypercholesterolemia  -     CBC & Differential; Future  -     Comprehensive Metabolic Panel; Future  -     Lipid Panel; Future  -     TSH Rfx On Abnormal To Free T4; Future    7. Impaired fasting glucose  -     Comprehensive Metabolic Panel; Future    8. Need for hepatitis C screening test  -     Hepatitis C antibody; Future    9. Long-term use of high-risk medication  -     Urine Drug Screen - Urine, Clean Catch    10. Class 1 obesity due to excess calories with serious comorbidity and body mass index (BMI) of 32.0 to 32.9 in adult  Comments:  Disc diet and exercise  Overview:  Disc diet and exercise       Follow Up     Return in about 3 months (around 7/29/2022).    Patient was given instructions and counseling regarding her condition or for health maintenance advice. Please see specific information pulled into the AVS if appropriate.     I have reviewed information obtained and documented by others and I have confirmed the accuracy of this documented note.    HANNAH Olmstead

## 2022-05-26 ENCOUNTER — HOSPITAL ENCOUNTER (EMERGENCY)
Facility: HOSPITAL | Age: 47
Discharge: LEFT BEFORE DISCHARGE | End: 2022-05-26
Attending: EMERGENCY MEDICINE

## 2022-05-26 ENCOUNTER — APPOINTMENT (OUTPATIENT)
Dept: GENERAL RADIOLOGY | Facility: HOSPITAL | Age: 47
End: 2022-05-26

## 2022-05-26 VITALS
TEMPERATURE: 98.1 F | SYSTOLIC BLOOD PRESSURE: 120 MMHG | HEIGHT: 67 IN | OXYGEN SATURATION: 97 % | DIASTOLIC BLOOD PRESSURE: 90 MMHG | BODY MASS INDEX: 33.25 KG/M2 | WEIGHT: 211.86 LBS | HEART RATE: 60 BPM | RESPIRATION RATE: 20 BRPM

## 2022-05-26 DIAGNOSIS — R07.9 CHEST PAIN, UNSPECIFIED TYPE: Primary | ICD-10-CM

## 2022-05-26 LAB
ALBUMIN SERPL-MCNC: 4.3 G/DL (ref 3.5–5.2)
ALBUMIN/GLOB SERPL: 1.4 G/DL
ALP SERPL-CCNC: 71 U/L (ref 39–117)
ALT SERPL W P-5'-P-CCNC: 13 U/L (ref 1–33)
ANION GAP SERPL CALCULATED.3IONS-SCNC: 12.9 MMOL/L (ref 5–15)
AST SERPL-CCNC: 18 U/L (ref 1–32)
BILIRUB SERPL-MCNC: 0.3 MG/DL (ref 0–1.2)
BUN SERPL-MCNC: 11 MG/DL (ref 6–20)
BUN/CREAT SERPL: 15.7 (ref 7–25)
CALCIUM SPEC-SCNC: 9.5 MG/DL (ref 8.6–10.5)
CHLORIDE SERPL-SCNC: 99 MMOL/L (ref 98–107)
CK MB SERPL-CCNC: 1.08 NG/ML
CK SERPL-CCNC: 34 U/L (ref 20–180)
CO2 SERPL-SCNC: 25.1 MMOL/L (ref 22–29)
CREAT SERPL-MCNC: 0.7 MG/DL (ref 0.57–1)
DEPRECATED RDW RBC AUTO: 38.3 FL (ref 37–54)
EGFRCR SERPLBLD CKD-EPI 2021: 109.5 ML/MIN/1.73
EOSINOPHIL # BLD MANUAL: 0.44 10*3/MM3 (ref 0–0.4)
EOSINOPHIL NFR BLD MANUAL: 3 % (ref 0.3–6.2)
ERYTHROCYTE [DISTWIDTH] IN BLOOD BY AUTOMATED COUNT: 12.3 % (ref 12.3–15.4)
GLOBULIN UR ELPH-MCNC: 3.1 GM/DL
GLUCOSE SERPL-MCNC: 130 MG/DL (ref 65–99)
HCT VFR BLD AUTO: 43.9 % (ref 34–46.6)
HGB BLD-MCNC: 14.8 G/DL (ref 12–15.9)
HOLD SPECIMEN: NORMAL
LARGE PLATELETS: ABNORMAL
LIPASE SERPL-CCNC: 41 U/L (ref 13–60)
LYMPHOCYTES # BLD MANUAL: 4.52 10*3/MM3 (ref 0.7–3.1)
LYMPHOCYTES NFR BLD MANUAL: 2 % (ref 5–12)
MAGNESIUM SERPL-MCNC: 2.1 MG/DL (ref 1.6–2.6)
MCH RBC QN AUTO: 28.8 PG (ref 26.6–33)
MCHC RBC AUTO-ENTMCNC: 33.7 G/DL (ref 31.5–35.7)
MCV RBC AUTO: 85.4 FL (ref 79–97)
MONOCYTES # BLD: 0.29 10*3/MM3 (ref 0.1–0.9)
NEUTROPHILS # BLD AUTO: 9.32 10*3/MM3 (ref 1.7–7)
NEUTROPHILS NFR BLD MANUAL: 64 % (ref 42.7–76)
NT-PROBNP SERPL-MCNC: 33.1 PG/ML (ref 0–450)
PLATELET # BLD AUTO: 352 10*3/MM3 (ref 140–450)
PMV BLD AUTO: 10.4 FL (ref 6–12)
POTASSIUM SERPL-SCNC: 4.3 MMOL/L (ref 3.5–5.2)
PROT SERPL-MCNC: 7.4 G/DL (ref 6–8.5)
RBC # BLD AUTO: 5.14 10*6/MM3 (ref 3.77–5.28)
RBC MORPH BLD: NORMAL
SCAN SLIDE: NORMAL
SMALL PLATELETS BLD QL SMEAR: ADEQUATE
SODIUM SERPL-SCNC: 137 MMOL/L (ref 136–145)
TROPONIN I SERPL-MCNC: 0 NG/ML (ref 0–0.6)
VARIANT LYMPHS NFR BLD MANUAL: 23 % (ref 19.6–45.3)
VARIANT LYMPHS NFR BLD MANUAL: 8 % (ref 0–5)
WBC MORPH BLD: NORMAL
WBC NRBC COR # BLD: 14.57 10*3/MM3 (ref 3.4–10.8)
WHOLE BLOOD HOLD COAG: NORMAL
WHOLE BLOOD HOLD SPECIMEN: NORMAL

## 2022-05-26 PROCEDURE — 83735 ASSAY OF MAGNESIUM: CPT

## 2022-05-26 PROCEDURE — 71045 X-RAY EXAM CHEST 1 VIEW: CPT

## 2022-05-26 PROCEDURE — 99211 OFF/OP EST MAY X REQ PHY/QHP: CPT

## 2022-05-26 PROCEDURE — 84484 ASSAY OF TROPONIN QUANT: CPT

## 2022-05-26 PROCEDURE — 83880 ASSAY OF NATRIURETIC PEPTIDE: CPT

## 2022-05-26 PROCEDURE — 80053 COMPREHEN METABOLIC PANEL: CPT

## 2022-05-26 PROCEDURE — 82553 CREATINE MB FRACTION: CPT

## 2022-05-26 PROCEDURE — 85007 BL SMEAR W/DIFF WBC COUNT: CPT

## 2022-05-26 PROCEDURE — 82550 ASSAY OF CK (CPK): CPT

## 2022-05-26 PROCEDURE — 93005 ELECTROCARDIOGRAM TRACING: CPT | Performed by: EMERGENCY MEDICINE

## 2022-05-26 PROCEDURE — 93005 ELECTROCARDIOGRAM TRACING: CPT

## 2022-05-26 PROCEDURE — 85025 COMPLETE CBC W/AUTO DIFF WBC: CPT

## 2022-05-26 PROCEDURE — 83690 ASSAY OF LIPASE: CPT

## 2022-05-26 RX ORDER — SODIUM CHLORIDE 0.9 % (FLUSH) 0.9 %
10 SYRINGE (ML) INJECTION AS NEEDED
Status: DISCONTINUED | OUTPATIENT
Start: 2022-05-26 | End: 2022-05-26 | Stop reason: HOSPADM

## 2022-05-26 RX ORDER — ASPIRIN 81 MG/1
324 TABLET, CHEWABLE ORAL ONCE
Status: COMPLETED | OUTPATIENT
Start: 2022-05-26 | End: 2022-05-26

## 2022-05-26 RX ADMIN — ASPIRIN 81 MG CHEWABLE TABLET 324 MG: 81 TABLET CHEWABLE at 10:23

## 2022-06-02 LAB — QT INTERVAL: 407 MS

## 2022-06-13 ENCOUNTER — TELEPHONE (OUTPATIENT)
Dept: FAMILY MEDICINE CLINIC | Facility: CLINIC | Age: 47
End: 2022-06-13

## 2022-06-27 ENCOUNTER — TELEPHONE (OUTPATIENT)
Dept: FAMILY MEDICINE CLINIC | Facility: CLINIC | Age: 47
End: 2022-06-27

## 2022-06-27 DIAGNOSIS — I10 ESSENTIAL HYPERTENSION: ICD-10-CM

## 2022-06-27 RX ORDER — LISINOPRIL 10 MG/1
10 TABLET ORAL DAILY
Qty: 90 TABLET | Refills: 1 | Status: SHIPPED | OUTPATIENT
Start: 2022-06-27 | End: 2022-12-30 | Stop reason: SDUPTHER

## 2022-07-18 DIAGNOSIS — I10 ESSENTIAL HYPERTENSION: ICD-10-CM

## 2022-07-18 DIAGNOSIS — E78.00 PURE HYPERCHOLESTEROLEMIA: ICD-10-CM

## 2022-07-18 DIAGNOSIS — E55.9 VITAMIN D DEFICIENCY: Primary | ICD-10-CM

## 2022-07-18 DIAGNOSIS — E66.09 CLASS 1 OBESITY DUE TO EXCESS CALORIES WITH SERIOUS COMORBIDITY AND BODY MASS INDEX (BMI) OF 32.0 TO 32.9 IN ADULT: ICD-10-CM

## 2022-07-18 DIAGNOSIS — R73.01 IMPAIRED FASTING GLUCOSE: ICD-10-CM

## 2022-07-18 DIAGNOSIS — R23.2 HOT FLASHES: ICD-10-CM

## 2022-07-19 ENCOUNTER — LAB (OUTPATIENT)
Dept: LAB | Facility: HOSPITAL | Age: 47
End: 2022-07-19

## 2022-07-19 DIAGNOSIS — F41.9 ANXIETY: ICD-10-CM

## 2022-07-19 DIAGNOSIS — E55.9 VITAMIN D DEFICIENCY: Chronic | ICD-10-CM

## 2022-07-19 DIAGNOSIS — R23.2 HOT FLASHES: ICD-10-CM

## 2022-07-19 DIAGNOSIS — Z11.59 NEED FOR HEPATITIS C SCREENING TEST: ICD-10-CM

## 2022-07-19 DIAGNOSIS — G47.00 INSOMNIA, UNSPECIFIED TYPE: ICD-10-CM

## 2022-07-19 DIAGNOSIS — E55.9 VITAMIN D DEFICIENCY: ICD-10-CM

## 2022-07-19 DIAGNOSIS — R73.01 IMPAIRED FASTING GLUCOSE: ICD-10-CM

## 2022-07-19 DIAGNOSIS — E66.09 CLASS 1 OBESITY DUE TO EXCESS CALORIES WITH SERIOUS COMORBIDITY AND BODY MASS INDEX (BMI) OF 32.0 TO 32.9 IN ADULT: ICD-10-CM

## 2022-07-19 DIAGNOSIS — E78.00 PURE HYPERCHOLESTEROLEMIA: ICD-10-CM

## 2022-07-19 DIAGNOSIS — I10 ESSENTIAL HYPERTENSION: ICD-10-CM

## 2022-07-19 LAB
25(OH)D3 SERPL-MCNC: 35.9 NG/ML (ref 30–100)
ALBUMIN SERPL-MCNC: 4.3 G/DL (ref 3.5–5.2)
ALBUMIN/GLOB SERPL: 1.3 G/DL
ALP SERPL-CCNC: 60 U/L (ref 39–117)
ALT SERPL W P-5'-P-CCNC: 10 U/L (ref 1–33)
AMPHET+METHAMPHET UR QL: NEGATIVE
ANION GAP SERPL CALCULATED.3IONS-SCNC: 13.1 MMOL/L (ref 5–15)
AST SERPL-CCNC: 11 U/L (ref 1–32)
BACTERIA UR QL AUTO: NORMAL /HPF
BARBITURATES UR QL SCN: NEGATIVE
BASOPHILS # BLD AUTO: 0.11 10*3/MM3 (ref 0–0.2)
BASOPHILS NFR BLD AUTO: 0.9 % (ref 0–1.5)
BENZODIAZ UR QL SCN: NEGATIVE
BILIRUB SERPL-MCNC: 0.6 MG/DL (ref 0–1.2)
BILIRUB UR QL STRIP: NEGATIVE
BUN SERPL-MCNC: 11 MG/DL (ref 6–20)
BUN/CREAT SERPL: 14.3 (ref 7–25)
CALCIUM SPEC-SCNC: 9.7 MG/DL (ref 8.6–10.5)
CANNABINOIDS SERPL QL: NEGATIVE
CHLORIDE SERPL-SCNC: 102 MMOL/L (ref 98–107)
CHOLEST SERPL-MCNC: 202 MG/DL (ref 0–200)
CLARITY UR: ABNORMAL
CO2 SERPL-SCNC: 27.9 MMOL/L (ref 22–29)
COCAINE UR QL: NEGATIVE
COLOR UR: YELLOW
CREAT SERPL-MCNC: 0.77 MG/DL (ref 0.57–1)
DEPRECATED RDW RBC AUTO: 39.4 FL (ref 37–54)
EGFRCR SERPLBLD CKD-EPI 2021: 97.1 ML/MIN/1.73
EOSINOPHIL # BLD AUTO: 0.77 10*3/MM3 (ref 0–0.4)
EOSINOPHIL NFR BLD AUTO: 6.5 % (ref 0.3–6.2)
ERYTHROCYTE [DISTWIDTH] IN BLOOD BY AUTOMATED COUNT: 12.3 % (ref 12.3–15.4)
FSH SERPL-ACNC: 6.53 MIU/ML
GLOBULIN UR ELPH-MCNC: 3.2 GM/DL
GLUCOSE SERPL-MCNC: 101 MG/DL (ref 65–99)
GLUCOSE UR STRIP-MCNC: NEGATIVE MG/DL
HBA1C MFR BLD: 5.3 % (ref 4.8–5.6)
HCT VFR BLD AUTO: 45.6 % (ref 34–46.6)
HCV AB SER DONR QL: NORMAL
HDLC SERPL-MCNC: 41 MG/DL (ref 40–60)
HGB BLD-MCNC: 15 G/DL (ref 12–15.9)
HGB UR QL STRIP.AUTO: ABNORMAL
HYALINE CASTS UR QL AUTO: NORMAL /LPF
IMM GRANULOCYTES # BLD AUTO: 0.04 10*3/MM3 (ref 0–0.05)
IMM GRANULOCYTES NFR BLD AUTO: 0.3 % (ref 0–0.5)
KETONES UR QL STRIP: NEGATIVE
LDLC SERPL CALC-MCNC: 139 MG/DL (ref 0–100)
LDLC/HDLC SERPL: 3.33 {RATIO}
LEUKOCYTE ESTERASE UR QL STRIP.AUTO: ABNORMAL
LH SERPL-ACNC: 6.81 MIU/ML
LYMPHOCYTES # BLD AUTO: 4.08 10*3/MM3 (ref 0.7–3.1)
LYMPHOCYTES NFR BLD AUTO: 34.3 % (ref 19.6–45.3)
MCH RBC QN AUTO: 28.6 PG (ref 26.6–33)
MCHC RBC AUTO-ENTMCNC: 32.9 G/DL (ref 31.5–35.7)
MCV RBC AUTO: 86.9 FL (ref 79–97)
METHADONE UR QL SCN: NEGATIVE
MONOCYTES # BLD AUTO: 0.82 10*3/MM3 (ref 0.1–0.9)
MONOCYTES NFR BLD AUTO: 6.9 % (ref 5–12)
NEUTROPHILS NFR BLD AUTO: 51.1 % (ref 42.7–76)
NEUTROPHILS NFR BLD AUTO: 6.07 10*3/MM3 (ref 1.7–7)
NITRITE UR QL STRIP: NEGATIVE
NRBC BLD AUTO-RTO: 0 /100 WBC (ref 0–0.2)
OPIATES UR QL: NEGATIVE
OXYCODONE UR QL SCN: NEGATIVE
PH UR STRIP.AUTO: 6.5 [PH] (ref 5–8)
PLATELET # BLD AUTO: 301 10*3/MM3 (ref 140–450)
PMV BLD AUTO: 11 FL (ref 6–12)
POTASSIUM SERPL-SCNC: 3.4 MMOL/L (ref 3.5–5.2)
PROGEST SERPL-MCNC: 0.18 NG/ML
PROT SERPL-MCNC: 7.5 G/DL (ref 6–8.5)
PROT UR QL STRIP: ABNORMAL
RBC # BLD AUTO: 5.25 10*6/MM3 (ref 3.77–5.28)
RBC # UR STRIP: NORMAL /HPF
REF LAB TEST METHOD: NORMAL
SODIUM SERPL-SCNC: 143 MMOL/L (ref 136–145)
SP GR UR STRIP: 1.02 (ref 1–1.03)
SQUAMOUS #/AREA URNS HPF: NORMAL /HPF
TRIGL SERPL-MCNC: 122 MG/DL (ref 0–150)
TSH SERPL DL<=0.05 MIU/L-ACNC: 0.97 UIU/ML (ref 0.27–4.2)
UROBILINOGEN UR QL STRIP: ABNORMAL
VLDLC SERPL-MCNC: 22 MG/DL (ref 5–40)
WBC # UR STRIP: NORMAL /HPF
WBC NRBC COR # BLD: 11.89 10*3/MM3 (ref 3.4–10.8)

## 2022-07-19 PROCEDURE — 80307 DRUG TEST PRSMV CHEM ANLYZR: CPT | Performed by: PHYSICIAN ASSISTANT

## 2022-07-19 PROCEDURE — 80050 GENERAL HEALTH PANEL: CPT

## 2022-07-19 PROCEDURE — 82306 VITAMIN D 25 HYDROXY: CPT

## 2022-07-19 PROCEDURE — 83002 ASSAY OF GONADOTROPIN (LH): CPT

## 2022-07-19 PROCEDURE — 82672 ASSAY OF ESTROGEN: CPT

## 2022-07-19 PROCEDURE — 81001 URINALYSIS AUTO W/SCOPE: CPT

## 2022-07-19 PROCEDURE — 80061 LIPID PANEL: CPT

## 2022-07-19 PROCEDURE — 83036 HEMOGLOBIN GLYCOSYLATED A1C: CPT

## 2022-07-19 PROCEDURE — 86803 HEPATITIS C AB TEST: CPT

## 2022-07-19 PROCEDURE — 36415 COLL VENOUS BLD VENIPUNCTURE: CPT

## 2022-07-19 PROCEDURE — 83001 ASSAY OF GONADOTROPIN (FSH): CPT

## 2022-07-19 PROCEDURE — 84403 ASSAY OF TOTAL TESTOSTERONE: CPT

## 2022-07-19 PROCEDURE — 84144 ASSAY OF PROGESTERONE: CPT

## 2022-07-22 ENCOUNTER — TELEPHONE (OUTPATIENT)
Dept: FAMILY MEDICINE CLINIC | Facility: CLINIC | Age: 47
End: 2022-07-22

## 2022-07-22 DIAGNOSIS — E87.6 DECREASED POTASSIUM IN THE BLOOD: Primary | ICD-10-CM

## 2022-07-22 LAB — ESTROGEN SERPL-MCNC: 175 PG/ML

## 2022-07-22 RX ORDER — POTASSIUM CHLORIDE 20 MEQ/1
20 TABLET, EXTENDED RELEASE ORAL DAILY
Qty: 90 TABLET | Refills: 1 | Status: SHIPPED | OUTPATIENT
Start: 2022-07-22 | End: 2023-01-19 | Stop reason: SDUPTHER

## 2022-07-28 LAB — TESTOST SERPL-MCNC: 33 NG/DL

## 2022-08-09 ENCOUNTER — TELEMEDICINE (OUTPATIENT)
Dept: FAMILY MEDICINE CLINIC | Facility: CLINIC | Age: 47
End: 2022-08-09

## 2022-08-09 DIAGNOSIS — F17.210 CIGARETTE NICOTINE DEPENDENCE WITHOUT COMPLICATION: Chronic | ICD-10-CM

## 2022-08-09 DIAGNOSIS — L98.9 FACIAL SKIN LESION: ICD-10-CM

## 2022-08-09 DIAGNOSIS — F32.A DEPRESSION, UNSPECIFIED DEPRESSION TYPE: ICD-10-CM

## 2022-08-09 DIAGNOSIS — F41.9 ANXIETY: ICD-10-CM

## 2022-08-09 DIAGNOSIS — Z12.31 ENCOUNTER FOR SCREENING MAMMOGRAM FOR MALIGNANT NEOPLASM OF BREAST: Primary | ICD-10-CM

## 2022-08-09 DIAGNOSIS — Z12.11 SCREENING FOR COLON CANCER: ICD-10-CM

## 2022-08-09 PROCEDURE — 99213 OFFICE O/P EST LOW 20 MIN: CPT | Performed by: PHYSICIAN ASSISTANT

## 2022-08-09 RX ORDER — CLONAZEPAM 0.5 MG/1
0.5 TABLET ORAL 2 TIMES DAILY PRN
Qty: 30 TABLET | Refills: 0 | Status: SHIPPED | OUTPATIENT
Start: 2022-08-09 | End: 2023-01-19

## 2022-08-09 NOTE — PROGRESS NOTES
You have chosen to receive care through a telehealth visit.  Do you consent to use a video/audio connection for your medical care today? Yes  Chief Complaint  Hypertension (3 month follow up), Hyperlipidemia, Vitamin D Deficiency, and Anxiety    SUBJECTIVE  Sushila Stark presents to [facility]     History of Present Illness     Pt presents today for 3 month follow up.    Pt states she would like to discuss changing the ativan to xanax.    Pt requested update on derm referral; advised appt request was placed online and provided phone # to call for appt.    Labs 7/19/22  A1C 5.3    josefina 8/9/22  uds 7/19/22  Cc 4/29/22    Past Medical History:   Diagnosis Date   • Allergic rhinitis due to allergen 02/14/2017   • Anal high risk HPV DNA test positive    • Anxiety    • Anxiety disorder 02/14/2017   • Depression    • Essential hypertension 11/01/2018   • Hx of abnormal cervical Pap smear    • Hyperlipidemia    • Hypertension    • Impaired fasting glucose 01/13/2021   • Insomnia, unspecified 04/13/2020   • Kidney stone    • Migraine headache    • Nicotine dependence 09/24/2018   • Psychiatric illness    • Streptococcal sore throat    • Ureterolithiasis 08/18/2016   • Vitamin D deficiency 08/20/2020      Family History   Problem Relation Age of Onset   • Hypertension Mother    • Other Other         BLADDER CANCER MALIGNANT   • Diabetes type II Other         MELLITUS      Past Surgical History:   Procedure Laterality Date   • CERVICAL CONE BIOPSY     • CHOLECYSTECTOMY     • CYSTOSCOPY      WITH BILATERAL RETROGRADE PYELOGRAPHY   • HYSTERECTOMY          Current Outpatient Medications:   •  ergocalciferol (ERGOCALCIFEROL) 1.25 MG (50470 UT) capsule, Take 1 capsule by mouth 1 (One) Time Per Week., Disp: 13 capsule, Rfl: 1  •  escitalopram (Lexapro) 10 MG tablet, Take 1 tablet by mouth Daily., Disp: 30 tablet, Rfl: 1  •  furosemide (LASIX) 40 MG tablet, Take 1 tablet by mouth 2 (Two)  "Times a Day., Disp: 60 tablet, Rfl: 5  •  lisinopril (PRINIVIL,ZESTRIL) 10 MG tablet, Take 1 tablet by mouth Daily., Disp: 90 tablet, Rfl: 1  •  potassium chloride (K-DUR,KLOR-CON) 20 MEQ CR tablet, Take 1 tablet by mouth Daily., Disp: 90 tablet, Rfl: 1  •  zolpidem (AMBIEN) 10 MG tablet, Take 1 tablet by mouth At Night As Needed for Sleep., Disp: 30 tablet, Rfl: 2  •  clonazePAM (KlonoPIN) 0.5 MG tablet, Take 1 tablet by mouth 2 (Two) Times a Day As Needed for Anxiety., Disp: 30 tablet, Rfl: 0    OBJECTIVE  Vital Signs:   There were no vitals taken for this visit.   Estimated body mass index is 33.18 kg/m² as calculated from the following:    Height as of 5/26/22: 170.2 cm (67\").    Weight as of 5/26/22: 96.1 kg (211 lb 13.8 oz).     Wt Readings from Last 3 Encounters:   05/26/22 96.1 kg (211 lb 13.8 oz)   03/10/22 98.4 kg (217 lb)   02/10/22 98.1 kg (216 lb 3.2 oz)     BP Readings from Last 3 Encounters:   05/26/22 120/90   05/21/22 136/85   03/10/22 152/98     Physical Exam  Constitutional:       Appearance: Normal appearance.   HENT:      Head: Normocephalic.   Skin:     Comments: Normal appearing   Neurological:      Mental Status: She is alert.   Psychiatric:         Mood and Affect: Mood normal.         Behavior: Behavior normal.        Result Review    Common labs    Common Labsle 5/26/22 5/26/22 7/19/22 7/19/22 7/19/22 7/19/22    0947 0947 0642 [id number] [id number] 0642   Glucose 130 (A)    101 (A)    BUN 11    11    Creatinine 0.70    0.77    Sodium 137    143    Potassium 4.3    3.4 (A)    Chloride 99    102    Calcium 9.5    9.7    Albumin 4.30    4.30    Total Bilirubin 0.3    0.6    Alkaline Phosphatase 71    60    AST (SGOT) 18    11    ALT (SGPT) 13    10    WBC  14.57 (A) 11.89 (A)      Hemoglobin  14.8 15.0      Hematocrit  43.9 45.6      Platelets  352 301      Total Cholesterol      202 (A)   Triglycerides      122   HDL Cholesterol      41   LDL Cholesterol       139 (A)   Hemoglobin A1C    5.30     (A) " Abnormal value       Comments are available for some flowsheets but are not being displayed.             XR Chest 1 View    Result Date: 5/26/2022   No acute disease.  No significant change has occurred.        BEV GIRON MD       Electronically Signed and Approved By: BEV GIRON MD on 5/26/2022 at 10:29                The above data has been reviewed by HANNAH Olmstead 08/09/2022 10:04 EDT.          Patient Care Team:  Marcellus Jules PA as PCP - General (Physician Assistant)      ASSESSMENT & PLAN    Diagnoses and all orders for this visit:    1. Encounter for screening mammogram for malignant neoplasm of breast (Primary)  -     Mammo Screening Digital Tomosynthesis Bilateral With CAD; Future    2. Screening for colon cancer  -     Cologuard - Stool, Per Rectum; Future    3. Anxiety  -     Ambulatory Referral to Psychiatry  -     clonazePAM (KlonoPIN) 0.5 MG tablet; Take 1 tablet by mouth 2 (Two) Times a Day As Needed for Anxiety.  Dispense: 30 tablet; Refill: 0    4. Depression, unspecified depression type  -     Ambulatory Referral to Psychiatry    5. Cigarette nicotine dependence without complication  Comments:  Cont to smoke and not ready to quit    6. Facial skin lesion  -     Ambulatory Referral to Dermatology       Tobacco Use: High Risk   • Smoking Tobacco Use: Current Every Day Smoker   • Smokeless Tobacco Use: Never Used       Follow Up     Return in about 3 months (around 11/9/2022).    Patient was given instructions and counseling regarding her condition or for health maintenance advice. Please see specific information pulled into the AVS if appropriate.   I have reviewed information obtained and documented by others and I have confirmed the accuracy of this documented note.    HANNAH Olmstead

## 2022-08-18 ENCOUNTER — HOSPITAL ENCOUNTER (OUTPATIENT)
Dept: MAMMOGRAPHY | Facility: HOSPITAL | Age: 47
Discharge: HOME | End: 2022-08-18
Admitting: PHYSICIAN ASSISTANT

## 2022-08-18 DIAGNOSIS — Z12.31 ENCOUNTER FOR SCREENING MAMMOGRAM FOR MALIGNANT NEOPLASM OF BREAST: ICD-10-CM

## 2022-08-18 PROCEDURE — 77067 SCR MAMMO BI INCL CAD: CPT

## 2022-08-18 PROCEDURE — 77063 BREAST TOMOSYNTHESIS BI: CPT

## 2022-08-26 ENCOUNTER — TELEPHONE (OUTPATIENT)
Dept: FAMILY MEDICINE CLINIC | Facility: CLINIC | Age: 47
End: 2022-08-26

## 2022-08-26 DIAGNOSIS — I10 ESSENTIAL HYPERTENSION: ICD-10-CM

## 2022-08-26 DIAGNOSIS — R60.9 WATER RETENTION: ICD-10-CM

## 2022-08-26 RX ORDER — FUROSEMIDE 40 MG/1
40 TABLET ORAL 2 TIMES DAILY
Qty: 60 TABLET | Refills: 3 | Status: SHIPPED | OUTPATIENT
Start: 2022-08-26 | End: 2022-12-30 | Stop reason: SDUPTHER

## 2022-08-26 NOTE — TELEPHONE ENCOUNTER
----- Message from Sushila Stark sent at 8/26/2022  9:46 AM EDT -----  Regarding: Lasix   Can edena please call in my lasix

## 2022-09-24 DIAGNOSIS — G47.00 INSOMNIA, UNSPECIFIED TYPE: ICD-10-CM

## 2022-09-24 DIAGNOSIS — R60.9 WATER RETENTION: ICD-10-CM

## 2022-09-24 DIAGNOSIS — R60.9 EDEMA, UNSPECIFIED TYPE: ICD-10-CM

## 2022-09-24 DIAGNOSIS — I10 ESSENTIAL HYPERTENSION: ICD-10-CM

## 2022-09-26 RX ORDER — POTASSIUM CHLORIDE 750 MG/1
TABLET, FILM COATED, EXTENDED RELEASE ORAL
Qty: 30 TABLET | Refills: 5 | Status: SHIPPED | OUTPATIENT
Start: 2022-09-26 | End: 2023-01-19

## 2022-09-26 RX ORDER — ZOLPIDEM TARTRATE 10 MG/1
TABLET ORAL
Qty: 30 TABLET | Refills: 2 | Status: SHIPPED | OUTPATIENT
Start: 2022-09-26 | End: 2023-01-19 | Stop reason: SDUPTHER

## 2022-12-30 ENCOUNTER — TELEPHONE (OUTPATIENT)
Dept: FAMILY MEDICINE CLINIC | Facility: CLINIC | Age: 47
End: 2022-12-30

## 2022-12-30 DIAGNOSIS — G47.00 INSOMNIA, UNSPECIFIED TYPE: ICD-10-CM

## 2022-12-30 DIAGNOSIS — I10 ESSENTIAL HYPERTENSION: ICD-10-CM

## 2022-12-30 DIAGNOSIS — R60.9 WATER RETENTION: ICD-10-CM

## 2022-12-30 DIAGNOSIS — E87.6 DECREASED POTASSIUM IN THE BLOOD: ICD-10-CM

## 2022-12-30 RX ORDER — ZOLPIDEM TARTRATE 10 MG/1
10 TABLET ORAL NIGHTLY PRN
Qty: 30 TABLET | Refills: 2 | Status: CANCELLED | OUTPATIENT
Start: 2022-12-30

## 2022-12-30 RX ORDER — LISINOPRIL 10 MG/1
10 TABLET ORAL DAILY
Qty: 30 TABLET | Refills: 0 | Status: SHIPPED | OUTPATIENT
Start: 2022-12-30 | End: 2023-01-19 | Stop reason: SDUPTHER

## 2022-12-30 RX ORDER — FUROSEMIDE 40 MG/1
40 TABLET ORAL 2 TIMES DAILY
Qty: 60 TABLET | Refills: 0 | Status: SHIPPED | OUTPATIENT
Start: 2022-12-30 | End: 2023-01-19 | Stop reason: SDUPTHER

## 2022-12-30 NOTE — TELEPHONE ENCOUNTER
Caller: Sushila Stark    Relationship: Self    Best call back number: [phone]    Requested Prescriptions:   Requested Prescriptions     Pending Prescriptions Disp Refills   • furosemide (LASIX) 40 MG tablet 60 tablet 3     Sig: Take 1 tablet by mouth 2 (Two) Times a Day.   • lisinopril (PRINIVIL,ZESTRIL) 10 MG tablet 90 tablet 1     Sig: Take 1 tablet by mouth Daily.   • zolpidem (AMBIEN) 10 MG tablet 30 tablet 2     Sig: Take 1 tablet by mouth At Night As Needed for Sleep.        Pharmacy where request should be sent: [place], [place] - [address] - [phone]  - [phone] FX     Additional details provided by patient: PATIENT TRANSFERRED CARE PROVIDER NO LONGER IN PRACTICE (ESTELITA). PATIENT WILL RUN OUT OF MEDICATION BEFORE HER APPT ON 1.19.23. PATIENT DID SCHEDULE FIRST AVAILABLE APPT BUT ASKS FOR ENOUGH MEDS TO BE CALLED IN TO GET HER THROUGH THE APPOINTMENT.     Does the patient have less than a 3 day supply:  [x] Yes  [] No    Would you like a call back once the refill request has been completed: [x] Yes [] No    If the office needs to give you a call back, can they leave a voicemail: [x] Yes [] No    Dejah Martinez, PCT   12/30/22 10:06 EST

## 2023-01-03 NOTE — TELEPHONE ENCOUNTER
Phoned patient and ask her if she had any Ambien left she stated she has 2 pills left she does not take every day. She stated she is going to try to wait until her appointment with Mendy on 1/19/23, she states if she cannot wait until then she will call the office and set up an appointment here.

## 2023-01-19 ENCOUNTER — OFFICE VISIT (OUTPATIENT)
Dept: FAMILY MEDICINE CLINIC | Facility: CLINIC | Age: 48
End: 2023-01-19
Payer: COMMERCIAL

## 2023-01-19 VITALS
HEIGHT: 67 IN | DIASTOLIC BLOOD PRESSURE: 90 MMHG | TEMPERATURE: 98 F | RESPIRATION RATE: 18 BRPM | SYSTOLIC BLOOD PRESSURE: 142 MMHG | OXYGEN SATURATION: 97 % | BODY MASS INDEX: 33.82 KG/M2 | WEIGHT: 215.5 LBS | HEART RATE: 95 BPM

## 2023-01-19 DIAGNOSIS — I10 PRIMARY HYPERTENSION: ICD-10-CM

## 2023-01-19 DIAGNOSIS — D22.9 ATYPICAL MOLE: ICD-10-CM

## 2023-01-19 DIAGNOSIS — F41.9 ANXIETY: ICD-10-CM

## 2023-01-19 DIAGNOSIS — R60.0 BILATERAL LOWER EXTREMITY EDEMA: ICD-10-CM

## 2023-01-19 DIAGNOSIS — Z12.31 ENCOUNTER FOR SCREENING MAMMOGRAM FOR MALIGNANT NEOPLASM OF BREAST: ICD-10-CM

## 2023-01-19 DIAGNOSIS — F51.01 PRIMARY INSOMNIA: ICD-10-CM

## 2023-01-19 DIAGNOSIS — E87.6 HYPOKALEMIA: ICD-10-CM

## 2023-01-19 DIAGNOSIS — Z76.89 ESTABLISHING CARE WITH NEW DOCTOR, ENCOUNTER FOR: Primary | ICD-10-CM

## 2023-01-19 DIAGNOSIS — Z79.899 ENCOUNTER FOR MEDICATION MANAGEMENT: ICD-10-CM

## 2023-01-19 DIAGNOSIS — Z90.710 HISTORY OF HYSTERECTOMY: ICD-10-CM

## 2023-01-19 DIAGNOSIS — F33.0 MILD EPISODE OF RECURRENT MAJOR DEPRESSIVE DISORDER: ICD-10-CM

## 2023-01-19 PROCEDURE — 80305 DRUG TEST PRSMV DIR OPT OBS: CPT

## 2023-01-19 PROCEDURE — 99214 OFFICE O/P EST MOD 30 MIN: CPT

## 2023-01-19 RX ORDER — ZOLPIDEM TARTRATE 10 MG/1
10 TABLET ORAL NIGHTLY PRN
Qty: 30 TABLET | Refills: 2 | Status: CANCELLED | OUTPATIENT
Start: 2023-01-19

## 2023-01-19 RX ORDER — LISINOPRIL 10 MG/1
10 TABLET ORAL DAILY
Qty: 30 TABLET | Refills: 0 | Status: CANCELLED | OUTPATIENT
Start: 2023-01-19

## 2023-01-19 RX ORDER — BUSPIRONE HYDROCHLORIDE 5 MG/1
5 TABLET ORAL 3 TIMES DAILY
Qty: 90 TABLET | Refills: 3 | Status: SHIPPED | OUTPATIENT
Start: 2023-01-19

## 2023-01-19 RX ORDER — FUROSEMIDE 40 MG/1
40 TABLET ORAL 2 TIMES DAILY
Qty: 60 TABLET | Refills: 0 | Status: CANCELLED | OUTPATIENT
Start: 2023-01-19

## 2023-01-19 RX ORDER — LISINOPRIL 10 MG/1
10 TABLET ORAL DAILY
Qty: 30 TABLET | Refills: 0 | Status: SHIPPED | OUTPATIENT
Start: 2023-01-19 | End: 2023-02-03 | Stop reason: SDUPTHER

## 2023-01-19 RX ORDER — POTASSIUM CHLORIDE 20 MEQ/1
20 TABLET, EXTENDED RELEASE ORAL DAILY
Qty: 90 TABLET | Refills: 1 | Status: SHIPPED | OUTPATIENT
Start: 2023-01-19

## 2023-01-19 RX ORDER — POTASSIUM CHLORIDE 20 MEQ/1
20 TABLET, EXTENDED RELEASE ORAL DAILY
Qty: 90 TABLET | Refills: 1 | Status: CANCELLED | OUTPATIENT
Start: 2023-01-19

## 2023-01-19 RX ORDER — ZOLPIDEM TARTRATE 10 MG/1
10 TABLET ORAL NIGHTLY PRN
Qty: 30 TABLET | Refills: 2 | Status: SHIPPED | OUTPATIENT
Start: 2023-01-19

## 2023-01-19 RX ORDER — FUROSEMIDE 40 MG/1
40 TABLET ORAL 2 TIMES DAILY
Qty: 60 TABLET | Refills: 0 | Status: SHIPPED | OUTPATIENT
Start: 2023-01-19 | End: 2023-04-05

## 2023-01-19 NOTE — ASSESSMENT & PLAN NOTE
Discussed with patient that we can continue her Ambien, she is tried other medications in the past, discussed the risks of this medication, the addictive nature of this medication, and possible adverse effects/side effects.  Patient verbalized understanding of this.  UDS/consent obtained today, Carlos reviewed.

## 2023-01-19 NOTE — PROGRESS NOTES
Sushila Stark presents to [facility] with complaints of difficulty with sleep, increased anxiety, and is also here to establish care.      History of Present Illness  This is a 47-year-old female, past medical history significant for hypertension, history of hysterectomy, history of cholecystectomy, history of atypical mole with recent surgical removal, anxiety, depression, PTSD, insomnia, who presents to the clinic today with complaints of difficulty with sleep and increased anxiety.    Hypertension: States that she was diagnosed with this in the past, has been on medication for several years in regards to this, currently takes lisinopril 10 mg, and also has to take furosemide 40 mg twice daily as she does have pretty significant lower extremity swelling and generalized swelling.  Patient states that she does well when she takes this medication as prescribed, but when she runs out, she does have a lot of issues with swelling and cannot even get her ring off.  States that she also takes potassium supplements because of the Lasix, but overall seems to be doing pretty well.  Blood pressure today is 142/90, denies any chest pain or shortness of breath.  States that she has continued on her medications from her previous provider.    Atypical mole: Recently had a mole that has been on her upper lip for several years removed, states that it was benign, and it seems to be healing well.  Does see Derm specialist for this.  No acute issues at this time.    Anxiety/depression/PTSD/insomnia: Patient states that she is had an extensive history of anxiety, states that she is actually had pretty severe anxiety since she was young, has tried a lot of medications for this in the past.  Patient states that she also had back about 4 years ago, she actually became pregnant and lost the baby at about 6 months into pregnancy.  States that this is really hit her hard, she does have a lot of underlying  depression in regards to this, but states that she is got a really good support system and has good coping mechanisms.  Never went to counseling/therapy, states that it becomes severe that is the route she will go.  Has tried a few different depression medicines in the past but did not like the way that those made her feel.  Most recently took the Lexapro, states she was on it for a few years, and has tried Prozac in the past.  As far as anxiety goes, she has tried Ativan, Xanax, hydroxyzine, most recently Klonopin.  Did not like the way the Klonopin made her feel as it made her just feel numb, groggy, and not herself.  Has not tried any other medications for anxiety, is wondering if there is something she can take just on an as-needed basis for acute anxiety.  Recently stepped down from a high stress job, so hoping that is good to give her some better control of her anxiety as well.  Denies suicidal thoughts or ideation.  Does take Ambien for insomnia, states that she only takes it sporadically, has actually been out of it for the past few weeks as her previous provider did leave the practice and she was unable to get a refill.  Would like a refill this if able.    Mammogram due in August, we will go ahead and order.  We will also obtain blood work after next visit.  Has had hysterectomy, does not need Pap smear.  Does not wish to have COVID or flu vaccines, otherwise is up-to-date on preventative screenings.    Past Medical History:   Diagnosis Date   • Allergic rhinitis due to allergen 02/14/2017   • Anal high risk HPV DNA test positive    • Anxiety    • Anxiety disorder 02/14/2017   • Depression    • Essential hypertension 11/01/2018   • Hx of abnormal cervical Pap smear    • Hyperlipidemia    • Hypertension    • Impaired fasting glucose 01/13/2021   • Insomnia, unspecified 04/13/2020   • Kidney stone    • Migraine headache    • Nicotine dependence 09/24/2018   • Psychiatric illness    • Streptococcal sore  "throat    • Ureterolithiasis 08/18/2016   • Vitamin D deficiency 08/20/2020     Past Surgical History:   • CERVICAL CONE BIOPSY   • CHOLECYSTECTOMY   • CYSTOSCOPY    WITH BILATERAL RETROGRADE PYELOGRAPHY   • HYSTERECTOMY   s:  Social History     Socioeconomic History   • Marital status:    Tobacco Use   • Smoking status: Every Day     Packs/day: 1.00     Years: 5.00     Pack years: 5.00     Types: Cigarettes   • Smokeless tobacco: Never   • Tobacco comments:     STARTED AT AGE 22, STILL SMOKING, STOPPED SMOKING AT AGE 36 AND STARTED AGAIN AS OF/ /   Vaping Use   • Vaping Use: Never used   Substance and Sexual Activity   • Alcohol use: Yes     Comment: Rarely.    • Drug use: Not Currently   • Sexual activity: Not Currently    5.00     Types: Cigarettes   • Smokeless tobacco: Never   • Tobacco comments:     STARTED AT AGE 22, STILL SMOKING, STOPPED SMOKING AT AGE 36 AND STARTED AGAIN AS OF/ /   Vaping Use   • Vaping Use: Never used   Substance and Sexual Activity   • Alcohol use: Yes     Comment: Rarely.    • Drug use: Not Currently   • Sexual activity: Not Currently      Problem Relation Age of Onset   • Hypertension Mother    • Other Other         BLADDER CANCER MALIGNANT   • Diabetes type II Other         MELLITUS         Objective   Vital Signs:   /90   Pulse 95   Temp 98 °F (36.7 °C)   Resp 18   Ht 170.2 cm (67\")   Wt 97.8 kg (215 lb 8 oz)   SpO2 97%   BMI 33.75 kg/m²     Body mass index is 33.75 kg/m².    All labs, imaging, test results, and specialty provider notes reviewed with patient.       Physical Exam  Vitals reviewed.   Constitutional:       Appearance: Normal appearance.   Cardiovascular:      Rate and Rhythm: Normal rate and regular rhythm.      Pulses: Normal pulses.      Heart sounds: Normal heart sounds.   Pulmonary:      Effort: Pulmonary effort is normal.      Breath sounds: Normal breath sounds.   Neurological:      General: No focal deficit present.      Mental Status: She " is alert and oriented to person, place, and time.            Assessment and Plan:  Diagnoses and all orders for this visit:    1. Establishing care with new doctor, encounter for (Primary)    2. Encounter for medication management  -     POC Urine Drug Screen [facility]    3. Primary hypertension  Assessment & Plan:  Relatively stable, but slightly high.  Patient states that she is slightly nervous today, so we will hold off on adjusting any medications at this time.  Continue lisinopril 10 mg, continue furosemide twice daily, continue potassium supplement due to the furosemide.  Discussed lifestyle modifications, low-salt diet, may consider increasing the lisinopril in the future if blood pressure remains over 140/90.  Goal blood pressure 120/70.    Orders:  -     furosemide (LASIX) 40 MG tablet; Take 1 tablet by mouth 2 (Two) Times a Day.  Dispense: 60 tablet; Refill: 0  -     lisinopril (PRINIVIL,ZESTRIL) 10 MG tablet; Take 1 tablet by mouth Daily.  Dispense: 30 tablet; Refill: 0    4. Bilateral lower extremity edema  -     furosemide (LASIX) 40 MG tablet; Take 1 tablet by mouth 2 (Two) Times a Day.  Dispense: 60 tablet; Refill: 0    5. Anxiety  Assessment & Plan:  Not controlled, has tried several controlled substances in the past, has not tried BuSpar.  We will try this for her acute anxiety peer discussed with her that she can take this up to 3 times daily, if needed, can take it routinely or on an as-needed basis.  May consider increasing the dose if not beneficial would be 5 mg.  Discussed coping mechanisms, would even consider going to counseling/therapy to help with coping mechanisms as well.    Orders:  -     busPIRone (BUSPAR) 5 MG tablet; Take 1 tablet by mouth 3 (Three) Times a Day.  Dispense: 90 tablet; Refill: 3    6. Mild episode of recurrent major depressive disorder (HCC)  Assessment & Plan:  Not severe, but still present.  Patient does not want to be on medication at this time, so we will  continue with coping mechanisms, good social support, even consider counseling/therapy as well.  Discussed strict ER precautions for any suicidal thoughts or ideation.      7. Primary insomnia  Assessment & Plan:  Discussed with patient that we can continue her Ambien, she is tried other medications in the past, discussed the risks of this medication, the addictive nature of this medication, and possible adverse effects/side effects.  Patient verbalized understanding of this.  UDS/consent obtained today, Carlos reviewed.    Orders:  -     zolpidem (AMBIEN) 10 MG tablet; Take 1 tablet by mouth At Night As Needed for Sleep.  Dispense: 30 tablet; Refill: 2    8. Atypical mole    9. History of hysterectomy    10. Hypokalemia  Comments:  Continue on potassium supplement from the furosemide that is causing the low potassium.  Orders:  -     potassium chloride (K-DUR,KLOR-CON) 20 MEQ CR tablet; Take 1 tablet by mouth Daily.  Dispense: 90 tablet; Refill: 1    11. Encounter for screening mammogram for malignant neoplasm of breast  -     Mammo Screening Digital Tomosynthesis Bilateral With CAD; Future        Follow Up:  Return in about 3 months (around 4/19/2023).    Patient was given instructions and counseling regarding her condition or for health maintenance advice. Please see specific information pulled into the AVS if appropriate.

## 2023-01-19 NOTE — ASSESSMENT & PLAN NOTE
Not severe, but still present.  Patient does not want to be on medication at this time, so we will continue with coping mechanisms, good social support, even consider counseling/therapy as well.  Discussed strict ER precautions for any suicidal thoughts or ideation.

## 2023-01-19 NOTE — ASSESSMENT & PLAN NOTE
Not controlled, has tried several controlled substances in the past, has not tried BuSpar.  We will try this for her acute anxiety peer discussed with her that she can take this up to 3 times daily, if needed, can take it routinely or on an as-needed basis.  May consider increasing the dose if not beneficial would be 5 mg.  Discussed coping mechanisms, would even consider going to counseling/therapy to help with coping mechanisms as well.

## 2023-01-19 NOTE — ASSESSMENT & PLAN NOTE
Relatively stable, but slightly high.  Patient states that she is slightly nervous today, so we will hold off on adjusting any medications at this time.  Continue lisinopril 10 mg, continue furosemide twice daily, continue potassium supplement due to the furosemide.  Discussed lifestyle modifications, low-salt diet, may consider increasing the lisinopril in the future if blood pressure remains over 140/90.  Goal blood pressure 120/70.

## 2023-02-03 ENCOUNTER — OFFICE VISIT (OUTPATIENT)
Dept: FAMILY MEDICINE CLINIC | Facility: CLINIC | Age: 48
End: 2023-02-03
Payer: COMMERCIAL

## 2023-02-03 VITALS
TEMPERATURE: 98.3 F | WEIGHT: 216.5 LBS | HEIGHT: 67 IN | SYSTOLIC BLOOD PRESSURE: 155 MMHG | BODY MASS INDEX: 33.98 KG/M2 | HEART RATE: 76 BPM | OXYGEN SATURATION: 98 % | DIASTOLIC BLOOD PRESSURE: 99 MMHG

## 2023-02-03 DIAGNOSIS — R52 BODY ACHES: ICD-10-CM

## 2023-02-03 DIAGNOSIS — I10 PRIMARY HYPERTENSION: ICD-10-CM

## 2023-02-03 DIAGNOSIS — R09.81 NASAL CONGESTION: ICD-10-CM

## 2023-02-03 DIAGNOSIS — H65.111 NON-RECURRENT ACUTE ALLERGIC OTITIS MEDIA OF RIGHT EAR: Primary | ICD-10-CM

## 2023-02-03 LAB
EXPIRATION DATE: NORMAL
FLUAV AG UPPER RESP QL IA.RAPID: NOT DETECTED
FLUBV AG UPPER RESP QL IA.RAPID: NOT DETECTED
INTERNAL CONTROL: NORMAL
Lab: NORMAL
SARS-COV-2 AG UPPER RESP QL IA.RAPID: NOT DETECTED

## 2023-02-03 PROCEDURE — 87428 SARSCOV & INF VIR A&B AG IA: CPT | Performed by: STUDENT IN AN ORGANIZED HEALTH CARE EDUCATION/TRAINING PROGRAM

## 2023-02-03 PROCEDURE — 99214 OFFICE O/P EST MOD 30 MIN: CPT | Performed by: STUDENT IN AN ORGANIZED HEALTH CARE EDUCATION/TRAINING PROGRAM

## 2023-02-03 RX ORDER — AZITHROMYCIN 250 MG/1
TABLET, FILM COATED ORAL
Qty: 6 TABLET | Refills: 0 | Status: SHIPPED | OUTPATIENT
Start: 2023-02-03

## 2023-02-03 RX ORDER — PREDNISONE 50 MG/1
50 TABLET ORAL DAILY
Qty: 5 TABLET | Refills: 0 | Status: SHIPPED | OUTPATIENT
Start: 2023-02-03

## 2023-02-03 RX ORDER — LISINOPRIL 20 MG/1
20 TABLET ORAL DAILY
Qty: 90 TABLET | Refills: 1 | Status: SHIPPED | OUTPATIENT
Start: 2023-02-03

## 2023-02-03 NOTE — PROGRESS NOTES
"Chief Complaint  Ear pain and sinus congestion    Subjective         Sushila Stark is a 47 y.o. female who presents to [facility]    46 years old comes to the clinic today for an acute symptoms.    Complaining of 2 days history of ear pain mainly on right, sinus congestion.  Denies any fever/chest pain or shortness of breath but does report chills/body aches.  Physically active.  Reports mild headache cold-like symptoms.    Denies any sick contact or recent travel, denies any anosmia or any GI symptoms.  Tolerating p.o. intake without any difficulties    Objective   Vital Signs:   Vitals:    02/03/23 1140   BP: 155/99   BP Location: Left arm   Patient Position: Sitting   Cuff Size: Adult   Pulse: 76   Temp: 98.3 °F (36.8 °C)   TempSrc: Temporal   SpO2: 98%   Weight: 98.2 kg (216 lb 8 oz)   Height: 170.2 cm (67.01\")      Body mass index is 33.9 kg/m².   Wt Readings from Last 3 Encounters:   02/03/23 98.2 kg (216 lb 8 oz)   01/19/23 97.8 kg (215 lb 8 oz)   05/26/22 96.1 kg (211 lb 13.8 oz)      BP Readings from Last 3 Encounters:   02/03/23 155/99   01/19/23 142/90   09/02/22 (!) 152/101        Patient Care Team:  Mendy Resendez APRN as PCP - General (Nurse Practitioner)     Physical Exam  HENT:      Right Ear: Tympanic membrane is erythematous and bulging.      Left Ear: Tympanic membrane is retracted.      Mouth/Throat:      Pharynx: Pharyngeal swelling present. No posterior oropharyngeal erythema.   Cardiovascular:      Rate and Rhythm: Normal rate.   Pulmonary:      Effort: Pulmonary effort is normal.      Breath sounds: Normal breath sounds.                       Assessment and Plan   Diagnoses and all orders for this visit:    1. Non-recurrent acute allergic otitis media of right ear (Primary)  -     azithromycin (Zithromax Z-Nba) 250 MG tablet; Take 2 tablets by mouth on day 1, then 1 tablet daily on days 2-5  Dispense: 6 tablet; Refill: 0  -     predniSONE (DELTASONE) 50 " MG tablet; Take 1 tablet by mouth Daily.  Dispense: 5 tablet; Refill: 0    2. Body aches  -     POCT SARS-CoV-2 Antigen NEGRITO    3. Nasal congestion  -     POCT SARS-CoV-2 Antigen NEGRITO    4. Primary hypertension  Comments:  Uncontrolled, we will increase lisinopril to 20 mg, from 10 mg.  Orders:  -     lisinopril (PRINIVIL,ZESTRIL) 20 MG tablet; Take 1 tablet by mouth Daily.  Dispense: 90 tablet; Refill: 1      I will go ahead and treat patient empirically with Z-Nba and prednisone.  Negative rapid flu and COVID in the clinic today.    After reviewing patient's last few blood work; blood pressure seems uncontrolled, patient agrees to to increase lisinopril to 20 mg after lengthy discussion.  Patient agrees to follow-up with PCP to check blood work/electrolytes and further follow-ups.  Tobacco Use: High Risk   • Smoking Tobacco Use: Every Day   • Smokeless Tobacco Use: Never   • Passive Exposure: Not on file            Follow Up   No follow-ups on file.  Patient was given instructions and counseling regarding her condition or for health maintenance advice. Please see specific information pulled into the AVS if appropriate.

## 2023-02-14 RX ORDER — DOXYCYCLINE HYCLATE 100 MG/1
100 CAPSULE ORAL 2 TIMES DAILY
Qty: 14 CAPSULE | Refills: 0 | Status: SHIPPED | OUTPATIENT
Start: 2023-02-14

## 2023-02-14 RX ORDER — DEXTROMETHORPHAN HYDROBROMIDE AND PROMETHAZINE HYDROCHLORIDE 15; 6.25 MG/5ML; MG/5ML
5 SYRUP ORAL 4 TIMES DAILY PRN
Qty: 180 ML | Refills: 0 | Status: SHIPPED | OUTPATIENT
Start: 2023-02-14

## 2023-02-14 RX ORDER — BENZONATATE 100 MG/1
100 CAPSULE ORAL 3 TIMES DAILY PRN
Qty: 60 CAPSULE | Refills: 0 | Status: SHIPPED | OUTPATIENT
Start: 2023-02-14

## 2023-04-05 DIAGNOSIS — I10 PRIMARY HYPERTENSION: ICD-10-CM

## 2023-04-05 DIAGNOSIS — R60.0 BILATERAL LOWER EXTREMITY EDEMA: ICD-10-CM

## 2023-04-05 RX ORDER — FUROSEMIDE 40 MG/1
TABLET ORAL
Qty: 180 TABLET | Refills: 1 | Status: SHIPPED | OUTPATIENT
Start: 2023-04-05 | End: 2023-04-20

## 2023-04-05 RX ORDER — LISINOPRIL 10 MG/1
TABLET ORAL
Qty: 90 TABLET | Refills: 1 | OUTPATIENT
Start: 2023-04-05

## 2023-04-07 DIAGNOSIS — I10 PRIMARY HYPERTENSION: ICD-10-CM

## 2023-04-07 DIAGNOSIS — R60.0 BILATERAL LOWER EXTREMITY EDEMA: ICD-10-CM

## 2023-04-07 RX ORDER — FUROSEMIDE 40 MG/1
40 TABLET ORAL 2 TIMES DAILY
Qty: 180 TABLET | Refills: 1 | OUTPATIENT
Start: 2023-04-07

## 2023-04-20 ENCOUNTER — OFFICE VISIT (OUTPATIENT)
Dept: FAMILY MEDICINE CLINIC | Facility: CLINIC | Age: 48
End: 2023-04-20
Payer: COMMERCIAL

## 2023-04-20 VITALS
RESPIRATION RATE: 18 BRPM | SYSTOLIC BLOOD PRESSURE: 110 MMHG | WEIGHT: 213.5 LBS | HEIGHT: 67 IN | DIASTOLIC BLOOD PRESSURE: 84 MMHG | HEART RATE: 88 BPM | BODY MASS INDEX: 33.51 KG/M2 | OXYGEN SATURATION: 97 %

## 2023-04-20 DIAGNOSIS — F51.01 PRIMARY INSOMNIA: ICD-10-CM

## 2023-04-20 DIAGNOSIS — E78.00 PURE HYPERCHOLESTEROLEMIA: ICD-10-CM

## 2023-04-20 DIAGNOSIS — E55.9 VITAMIN D DEFICIENCY: ICD-10-CM

## 2023-04-20 DIAGNOSIS — F41.9 ANXIETY: ICD-10-CM

## 2023-04-20 DIAGNOSIS — Z00.00 ANNUAL PHYSICAL EXAM: ICD-10-CM

## 2023-04-20 DIAGNOSIS — E87.6 HYPOKALEMIA: ICD-10-CM

## 2023-04-20 DIAGNOSIS — F33.1 MODERATE EPISODE OF RECURRENT MAJOR DEPRESSIVE DISORDER: ICD-10-CM

## 2023-04-20 DIAGNOSIS — R60.0 BILATERAL LOWER EXTREMITY EDEMA: Primary | ICD-10-CM

## 2023-04-20 DIAGNOSIS — G47.00 INSOMNIA, UNSPECIFIED TYPE: ICD-10-CM

## 2023-04-20 DIAGNOSIS — R73.01 IMPAIRED FASTING GLUCOSE: ICD-10-CM

## 2023-04-20 DIAGNOSIS — I10 PRIMARY HYPERTENSION: ICD-10-CM

## 2023-04-20 DIAGNOSIS — M25.50 ARTHRALGIA, UNSPECIFIED JOINT: ICD-10-CM

## 2023-04-20 RX ORDER — HYDROXYZINE HYDROCHLORIDE 25 MG/1
25 TABLET, FILM COATED ORAL 3 TIMES DAILY PRN
Qty: 90 TABLET | Refills: 2 | Status: SHIPPED | OUTPATIENT
Start: 2023-04-20

## 2023-04-20 RX ORDER — BUMETANIDE 1 MG/1
1 TABLET ORAL DAILY
Qty: 90 TABLET | Refills: 1 | Status: SHIPPED | OUTPATIENT
Start: 2023-04-20

## 2023-04-20 RX ORDER — ESCITALOPRAM OXALATE 10 MG/1
10 TABLET ORAL DAILY
Qty: 90 TABLET | Refills: 1 | Status: SHIPPED | OUTPATIENT
Start: 2023-04-20

## 2023-04-20 NOTE — ASSESSMENT & PLAN NOTE
Better controlled with increased dose of lisinopril, will continue at 20 mg.  Discussed extensively about patient's diet, do think that the lack of control of salt intake definitely contributes to not only her blood pressure but also the excess lower extremity swelling.  Discussed with her that since she has been on Lasix for so long, I will switch her over to Bumex to take it daily, may need to increase this to twice daily in the future.  But discussed with her extensively that diet will play a big role in controlling lower extremity swelling.  If still refractory to treatment, will then consider other work-up and/or other medications at that time.  We will obtain blood work as it has been sometime since she had this done.

## 2023-04-20 NOTE — ASSESSMENT & PLAN NOTE
Not controlled, will start patient back on Lexapro as BuSpar did not help her.  Discussed with her to take the Lexapro daily, that it should not cause any weight gain, that I think the weight gain from previous was more related to fluid versus actually being from the medication.  Discussed it may take 4 to 6 weeks to get in system, discussed side effects.  May need to increase after this time.  We will also provide patient with hydroxyzine that she can use on an as-needed basis, or use at night to help her with sleep.  Strict ER precautions for any suicidal thoughts or ideation.

## 2023-04-20 NOTE — PROGRESS NOTES
Sushila Stark presents to [facility] with complaints of worsening anxiety and depression, worsening lower extremity edema, and is here for 3-month follow-up and annual physical.      History of Present Illness  This is a 47-year-old female who presents to clinic with complaints of worsening anxiety and depression, worsening lower extremity edema, and is here for 3-month follow-up and annual physical.    Anxiety/depression: Patient states that she has battled with this pretty extensively in the past, has actually tried several medications, states that she was on medication up until about 3 years ago when she just quit it abruptly.  Patient states that she was doing okay was able to manage this pretty well on her own, but has noticed over the past several weeks to few months that her depression is gotten a lot worse as well as her anxiety.  Patient states that she does cry about every single day, and that her anxiety at times in the evenings will get so heightened that it is hard for her to go to sleep and she takes an Ambien just mainly to mask her high anxiety.  States that she also used to be on Ativan that she would take in the evenings, states that it really worked well for her, but also knows that she cannot be on Ativan and Ambien at the same time.  Patient states that she is tried several medications, the one that works best for her was Lexapro, but her only concern is the weight gain that was associated with it.  States that the BuSpar that was provided at last visit did not provide any relief, states she even tried to take 2 or 3 of them at a time, never got any relief.  Has tried Wellbutrin in the past with no relief as well, as well as other medications that she cannot remember.  Denies any suicidal thoughts or ideation.    Hypertension/lower extremity swelling: Blood pressure today is much better controlled than previous, did have her lisinopril up to 20 mg, blood pressure  "today is 110/84.  She does still endorse quite a bit of generalized swelling, especially in the lower extremities.  Patient states that is not so bad today, but there are times whenever she gets pretty swollen and will have difficulty in time and even getting her flip-flops on or getting her ring on and off.  States that she does take her Lasix twice a day as prescribed, has been trying to increase her water intake, and does take potassium supplements with it as well.  States that her diet is not the greatest, she has been trying to decrease her Mountain Dew intake, is currently down to 1 a day.  Does drink a lot of sweet tea, and does try to avoid salty chips and other foods that she knows contributes to excess salt intake but she knows that she could do better in regards to this as well.  She denies any chest pain, no shortness of breath.  Has been on Lasix for at least 6 to 7 years.    The following portions of the patient's history were personally reviewed and updated as appropriate: allergies, current medications, past medical history, past surgical history, past family history, and past social history.       Objective   Vital Signs:   /84   Pulse 88   Resp 18   Ht 170.2 cm (67.01\")   Wt 96.8 kg (213 lb 8 oz)   SpO2 97%   BMI 33.43 kg/m²     Body mass index is 33.43 kg/m².    All labs, imaging, test results, and specialty provider notes reviewed with patient.     Physical Exam  Vitals reviewed.   Constitutional:       Appearance: Normal appearance.   Cardiovascular:      Rate and Rhythm: Normal rate and regular rhythm.      Pulses: Normal pulses.      Heart sounds: Normal heart sounds.   Pulmonary:      Effort: Pulmonary effort is normal.      Breath sounds: Normal breath sounds.   Neurological:      General: No focal deficit present.      Mental Status: She is alert and oriented to person, place, and time.            Assessment and Plan:  Diagnoses and all orders for this visit:    1. Bilateral " lower extremity edema (Primary)  Assessment & Plan:  Transition from Lasix to Bumex, discussed with patient extensively about diet changes, decreasing salt intake as I think that is the patient's biggest contributing factor to refractory edema.  Discussed with her to maintain on her potassium, we will repeat blood work, and if still refractory to treatment, further work-up will be done at that time.    Orders:  -     bumetanide (BUMEX) 1 MG tablet; Take 1 tablet by mouth Daily.  Dispense: 90 tablet; Refill: 1    2. Primary hypertension  Assessment & Plan:  Better controlled with increased dose of lisinopril, will continue at 20 mg.  Discussed extensively about patient's diet, do think that the lack of control of salt intake definitely contributes to not only her blood pressure but also the excess lower extremity swelling.  Discussed with her that since she has been on Lasix for so long, I will switch her over to Bumex to take it daily, may need to increase this to twice daily in the future.  But discussed with her extensively that diet will play a big role in controlling lower extremity swelling.  If still refractory to treatment, will then consider other work-up and/or other medications at that time.  We will obtain blood work as it has been sometime since she had this done.    Orders:  -     bumetanide (BUMEX) 1 MG tablet; Take 1 tablet by mouth Daily.  Dispense: 90 tablet; Refill: 1  -     CBC Auto Differential; Future  -     Comprehensive Metabolic Panel; Future  -     TSH Rfx On Abnormal To Free T4; Future  -     Urinalysis With Culture If Indicated -; Future    3. Anxiety  Assessment & Plan:  Not controlled, will start patient back on Lexapro as BuSpar did not help her.  Discussed with her to take the Lexapro daily, that it should not cause any weight gain, that I think the weight gain from previous was more related to fluid versus actually being from the medication.  Discussed it may take 4 to 6 weeks to get  in system, discussed side effects.  May need to increase after this time.  We will also provide patient with hydroxyzine that she can use on an as-needed basis, or use at night to help her with sleep.  Strict ER precautions for any suicidal thoughts or ideation.    Orders:  -     hydrOXYzine (ATARAX) 25 MG tablet; Take 1 tablet by mouth 3 (Three) Times a Day As Needed for Anxiety (insomnia).  Dispense: 90 tablet; Refill: 2  -     escitalopram (Lexapro) 10 MG tablet; Take 1 tablet by mouth Daily.  Dispense: 90 tablet; Refill: 1    4. Moderate episode of recurrent major depressive disorder  Assessment & Plan:  Same as above for anxiety.    Orders:  -     escitalopram (Lexapro) 10 MG tablet; Take 1 tablet by mouth Daily.  Dispense: 90 tablet; Refill: 1    5. Primary insomnia    6. Hypokalemia    7. Arthralgia, unspecified joint  -     JAILENE Direct Reflex to 11 Biomarker; Future  -     Cyclic citrul peptide antibody, IgG/IgA; Future  -     CK; Future  -     C-reactive protein; Future  -     Sedimentation rate; Future  -     Uric acid; Future  -     Antistreptolysin O screen; Future    8. Insomnia, unspecified type    9. Vitamin D deficiency  -     Vitamin D,25-Hydroxy; Future    10. Impaired fasting glucose  -     Hemoglobin A1c; Future    11. Pure hypercholesterolemia  Assessment & Plan:  Repeat lipid panel, diet changes and lifestyle modifications discussed.    Orders:  -     Lipid Panel; Future    12. Annual physical exam         Anticipatory Guidelines discussed with patient.    Discussed getting adequate exercise, reduced TV/electronic time, car safety including seat belt use, sexual activity (if applicable), and smoking/alcohol use (if applicable).       Follow Up:  Return in about 4 months (around 8/20/2023).    Patient was given instructions and counseling regarding her condition or for health maintenance advice. Please see specific information pulled into the AVS if appropriate.

## 2023-04-20 NOTE — ASSESSMENT & PLAN NOTE
Transition from Lasix to Bumex, discussed with patient extensively about diet changes, decreasing salt intake as I think that is the patient's biggest contributing factor to refractory edema.  Discussed with her to maintain on her potassium, we will repeat blood work, and if still refractory to treatment, further work-up will be done at that time.

## 2023-08-17 ENCOUNTER — OFFICE VISIT (OUTPATIENT)
Dept: FAMILY MEDICINE CLINIC | Facility: CLINIC | Age: 48
End: 2023-08-17
Payer: COMMERCIAL

## 2023-08-17 VITALS
BODY MASS INDEX: 34.18 KG/M2 | SYSTOLIC BLOOD PRESSURE: 152 MMHG | DIASTOLIC BLOOD PRESSURE: 88 MMHG | WEIGHT: 217.8 LBS | TEMPERATURE: 98.4 F | HEIGHT: 67 IN | OXYGEN SATURATION: 97 % | HEART RATE: 83 BPM

## 2023-08-17 DIAGNOSIS — I10 PRIMARY HYPERTENSION: ICD-10-CM

## 2023-08-17 DIAGNOSIS — F33.1 MODERATE EPISODE OF RECURRENT MAJOR DEPRESSIVE DISORDER: Primary | ICD-10-CM

## 2023-08-17 DIAGNOSIS — F51.01 PRIMARY INSOMNIA: ICD-10-CM

## 2023-08-17 DIAGNOSIS — E87.6 HYPOKALEMIA: ICD-10-CM

## 2023-08-17 DIAGNOSIS — R60.0 BILATERAL LOWER EXTREMITY EDEMA: ICD-10-CM

## 2023-08-17 DIAGNOSIS — F41.9 ANXIETY: ICD-10-CM

## 2023-08-17 RX ORDER — BUPROPION HYDROCHLORIDE 150 MG/1
150 TABLET ORAL DAILY
Qty: 30 TABLET | Refills: 2 | Status: SHIPPED | OUTPATIENT
Start: 2023-08-17

## 2023-08-17 RX ORDER — BUMETANIDE 1 MG/1
1 TABLET ORAL DAILY
Qty: 90 TABLET | Refills: 1 | Status: SHIPPED | OUTPATIENT
Start: 2023-08-17

## 2023-08-17 RX ORDER — POTASSIUM CHLORIDE 20 MEQ/1
20 TABLET, EXTENDED RELEASE ORAL DAILY
Qty: 90 TABLET | Refills: 1 | Status: SHIPPED | OUTPATIENT
Start: 2023-08-17

## 2023-08-17 RX ORDER — ZOLPIDEM TARTRATE 10 MG/1
10 TABLET ORAL NIGHTLY PRN
Qty: 30 TABLET | Refills: 2 | Status: SHIPPED | OUTPATIENT
Start: 2023-08-17

## 2023-08-17 RX ORDER — ESCITALOPRAM OXALATE 20 MG/1
20 TABLET ORAL DAILY
Qty: 90 TABLET | Refills: 1 | Status: SHIPPED | OUTPATIENT
Start: 2023-08-17

## 2023-08-17 NOTE — PROGRESS NOTES
Sushila Stark presents to [facility] with complaints of continued anxiety and depression, difficulty sleeping, is also here for 4-month follow-up.      History of Present Illness  This is a 48-year-old female who presents to the clinic for 4-month follow-up with complaints of continued anxiety and depression and difficulty sleeping.    Anxiety/depression/insomnia: Patient states that her anxiety and depression has not gotten any better over the past 4 months.  Patient states that the last time that she was on Lexapro, states that she noticed a big difference, but this time she is really not.  Has had a lot of stressors recently in her life as well, her daughters recently started driving, puts a lot of anxiety and stress as well.  Does of course have a big history of losing her son around 2017.  Has tried several other medications in the past, some to include Paxil and Prozac, did states that she tried Wellbutrin in the past but she does not remember how she responded to it.  States that she does not know if she is to go back to the Ambien, because she cannot sleep at night as well.  States that she will only get an hour or 2 of sleep at a time, and that is definitely impacting her quality of life as well.  Denies any suicidal thoughts or ideation.  But does state that her anxiety and depression is significantly worse.    Lower extremity swelling: Doing much better with now being on the Bumex.  Patient states that she no longer has any issues with this, does remain taking potassium every day as well.  Is due for blood work, states that she will go get this done tomorrow.    The following portions of the patient's history were personally reviewed and updated as appropriate: allergies, current medications, past medical history, past surgical history, past family history, and past social history.       Objective   Vital Signs:   /88 (BP Location: Left arm, Patient Position:  "Sitting, Cuff Size: Adult)   Pulse 83   Temp 98.4 øF (36.9 øC) (Temporal)   Ht 170.2 cm (67.01\")   Wt 98.8 kg (217 lb 12.8 oz)   SpO2 97%   BMI 34.10 kg/mý     Body mass index is 34.1 kg/mý.    All labs, imaging, test results, and specialty provider notes reviewed with patient.     Physical Exam  Vitals reviewed.   Constitutional:       Appearance: Normal appearance.   Cardiovascular:      Rate and Rhythm: Normal rate and regular rhythm.      Pulses: Normal pulses.      Heart sounds: Normal heart sounds.   Pulmonary:      Effort: Pulmonary effort is normal.      Breath sounds: Normal breath sounds.   Neurological:      General: No focal deficit present.      Mental Status: She is alert and oriented to person, place, and time.                       Assessment and Plan:  Diagnoses and all orders for this visit:    1. Moderate episode of recurrent major depressive disorder (Primary)  Assessment & Plan:  Not controlled.  We will continue on Lexapro, but increase the dose.  We will also add Wellbutrin as an adjunct medication to the Lexapro.  I am hoping that if we target 2 different hormones, that her anxiety and depression will be improved.  Did discuss with her that counseling/therapy would be a good option as well.  Strict ER precautions for any suicidal thoughts or ideation.  Did discuss this medication, possible side effects and use.    Orders:  -     escitalopram (Lexapro) 20 MG tablet; Take 1 tablet by mouth Daily.  Dispense: 90 tablet; Refill: 1  -     buPROPion XL (Wellbutrin XL) 150 MG 24 hr tablet; Take 1 tablet by mouth Daily.  Dispense: 30 tablet; Refill: 2    2. Hypokalemia  Comments:  Continue on potassium supplement from the furosemide that is causing the low potassium.  Orders:  -     potassium chloride (K-DUR,KLOR-CON) 20 MEQ CR tablet; Take 1 tablet by mouth Daily.  Dispense: 90 tablet; Refill: 1    3. Primary hypertension  -     bumetanide (BUMEX) 1 MG tablet; Take 1 tablet by mouth Daily.  " Dispense: 90 tablet; Refill: 1    4. Bilateral lower extremity edema  Assessment & Plan:  Stable and doing well on Bumex.  Continue.  Continue potassium supplement as well.    Orders:  -     bumetanide (BUMEX) 1 MG tablet; Take 1 tablet by mouth Daily.  Dispense: 90 tablet; Refill: 1    5. Primary insomnia  Assessment & Plan:  Continue on Ambien, discussed patient risks of this medication and use, hopefully will be able to control anxiety and depression which will also help with sleep and will not need to be on Ambien long-term.    Orders:  -     zolpidem (AMBIEN) 10 MG tablet; Take 1 tablet by mouth At Night As Needed for Sleep.  Dispense: 30 tablet; Refill: 2    6. Anxiety  -     escitalopram (Lexapro) 20 MG tablet; Take 1 tablet by mouth Daily.  Dispense: 90 tablet; Refill: 1  -     buPROPion XL (Wellbutrin XL) 150 MG 24 hr tablet; Take 1 tablet by mouth Daily.  Dispense: 30 tablet; Refill: 2          Follow Up:  Return in about 3 months (around 11/17/2023).    Patient was given instructions and counseling regarding her condition or for health maintenance advice. Please see specific information pulled into the AVS if appropriate.

## 2023-08-17 NOTE — ASSESSMENT & PLAN NOTE
Not controlled.  We will continue on Lexapro, but increase the dose.  We will also add Wellbutrin as an adjunct medication to the Lexapro.  I am hoping that if we target 2 different hormones, that her anxiety and depression will be improved.  Did discuss with her that counseling/therapy would be a good option as well.  Strict ER precautions for any suicidal thoughts or ideation.  Did discuss this medication, possible side effects and use.

## 2023-08-17 NOTE — ASSESSMENT & PLAN NOTE
Continue on Ambien, discussed patient risks of this medication and use, hopefully will be able to control anxiety and depression which will also help with sleep and will not need to be on Ambien long-term.

## 2023-09-11 ENCOUNTER — HOSPITAL ENCOUNTER (OUTPATIENT)
Dept: MAMMOGRAPHY | Facility: HOSPITAL | Age: 48
Discharge: HOME | End: 2023-09-11
Payer: COMMERCIAL

## 2023-09-11 DIAGNOSIS — Z12.31 ENCOUNTER FOR SCREENING MAMMOGRAM FOR MALIGNANT NEOPLASM OF BREAST: ICD-10-CM

## 2023-09-11 DIAGNOSIS — Z12.31 ENCOUNTER FOR SCREENING MAMMOGRAM FOR MALIGNANT NEOPLASM OF BREAST: Primary | ICD-10-CM

## 2023-09-11 PROCEDURE — 77063 BREAST TOMOSYNTHESIS BI: CPT

## 2023-09-11 PROCEDURE — 77067 SCR MAMMO BI INCL CAD: CPT

## 2023-09-20 DIAGNOSIS — I10 PRIMARY HYPERTENSION: ICD-10-CM

## 2023-09-20 RX ORDER — LISINOPRIL 20 MG/1
TABLET ORAL
Qty: 90 TABLET | Refills: 1 | Status: SHIPPED | OUTPATIENT
Start: 2023-09-20

## 2023-10-11 PROCEDURE — 87081 CULTURE SCREEN ONLY: CPT | Performed by: NURSE PRACTITIONER

## 2023-11-21 ENCOUNTER — OFFICE VISIT (OUTPATIENT)
Dept: FAMILY MEDICINE CLINIC | Facility: CLINIC | Age: 48
End: 2023-11-21
Payer: COMMERCIAL

## 2023-11-21 VITALS
BODY MASS INDEX: 34.84 KG/M2 | WEIGHT: 222 LBS | DIASTOLIC BLOOD PRESSURE: 94 MMHG | SYSTOLIC BLOOD PRESSURE: 152 MMHG | HEART RATE: 78 BPM | TEMPERATURE: 97.8 F | OXYGEN SATURATION: 98 % | HEIGHT: 67 IN

## 2023-11-21 DIAGNOSIS — H69.93 DYSFUNCTION OF BOTH EUSTACHIAN TUBES: Primary | ICD-10-CM

## 2023-11-21 DIAGNOSIS — R60.0 BILATERAL LOWER EXTREMITY EDEMA: ICD-10-CM

## 2023-11-21 DIAGNOSIS — E66.09 CLASS 1 OBESITY DUE TO EXCESS CALORIES WITH SERIOUS COMORBIDITY AND BODY MASS INDEX (BMI) OF 34.0 TO 34.9 IN ADULT: ICD-10-CM

## 2023-11-21 DIAGNOSIS — F41.9 ANXIETY: ICD-10-CM

## 2023-11-21 DIAGNOSIS — F33.1 MODERATE EPISODE OF RECURRENT MAJOR DEPRESSIVE DISORDER: ICD-10-CM

## 2023-11-21 DIAGNOSIS — I10 PRIMARY HYPERTENSION: ICD-10-CM

## 2023-11-21 DIAGNOSIS — F41.0 PANIC ATTACKS: ICD-10-CM

## 2023-11-21 PROBLEM — E66.811 CLASS 1 OBESITY DUE TO EXCESS CALORIES WITH SERIOUS COMORBIDITY AND BODY MASS INDEX (BMI) OF 34.0 TO 34.9 IN ADULT: Status: ACTIVE | Noted: 2022-04-29

## 2023-11-21 RX ORDER — ALPRAZOLAM 0.5 MG/1
0.5 TABLET ORAL 2 TIMES DAILY PRN
Qty: 30 TABLET | Refills: 0 | Status: SHIPPED | OUTPATIENT
Start: 2023-11-21

## 2023-11-21 RX ORDER — METHYLPREDNISOLONE ACETATE 40 MG/ML
40 INJECTION, SUSPENSION INTRA-ARTICULAR; INTRALESIONAL; INTRAMUSCULAR; SOFT TISSUE ONCE
Status: COMPLETED | OUTPATIENT
Start: 2023-11-21 | End: 2023-11-21

## 2023-11-21 RX ORDER — BUPROPION HYDROCHLORIDE 300 MG/1
300 TABLET ORAL DAILY
Qty: 90 TABLET | Refills: 1 | Status: SHIPPED | OUTPATIENT
Start: 2023-11-21

## 2023-11-21 RX ADMIN — METHYLPREDNISOLONE ACETATE 40 MG: 40 INJECTION, SUSPENSION INTRA-ARTICULAR; INTRALESIONAL; INTRAMUSCULAR; SOFT TISSUE at 09:20

## 2023-11-21 NOTE — PROGRESS NOTES
Sushila Stark presents to [facility] who presents to the clinic for 3-month follow-up.      History of Present Illness  This is a 48-year-old female who presents to the clinic for 3-month follow-up.    Anxiety/depression: Does feel like she is in a much better place in regards to her anxiety and depression, but does feel still find herself binge eating when she gets stressed.  Patient states that she seems to be more hungry, has gained about 10 pounds since her visit in April, and is wondering what could be contributing to that.  Once again does feel like the Lexapro has made a big difference, but was wondering if she can increase the Wellbutrin to see if that will help with some of the binge eating and also help with her appetite.  Denies any suicidal thoughts or ideation.  Does feel like that the holidays rolling around, this is when she typically has her panic attacks.  Would like just a few days worth of Xanax to try to help get her through the holidays, does not want to be on it long-term, understands the risks.    Obesity: Patient states that she has had some issues with losing weight, and is actually gained weight.  Of note, after talking to the patient, she states that she drinks 1 kratom bomb in the morning, will drink 2 monster coffees, and will drink several Mountain Dew's throughout the day.  States that she is always drank this amount, but has also noticed that she has been having some increasing amounts of heart palpitations.  States that they would last for about 20 to 25 seconds, feels like a fluttering in her chest, and then will go away.  Does not have any other symptoms associated, but is something that she went to mention.  Does state that she knows that she needs to come off some of the caffeine, but she has tried before and she just does not feel good when she has to come off of it.    Patient also states that both of her ears just feel full and stopped up.  She  "states that she is not experiencing a lot of other upper respiratory symptoms, has not had a lot of drainage, but just states that she has had muffled hearing, feels like her ears are little bit itchy, and has noticed some hearing issues.    Hypertension: Patient has not taken blood pressure medication today, and does not check at home.  She does have the capabilities.  Does feel like the water pill is doing well for her in controlling her lower leg swelling, and also remains on lisinopril 20 mg.  Denies chest pain, no shortness of breath.    The following portions of the patient's history were personally reviewed and updated as appropriate: allergies, current medications, past medical history, past surgical history, past family history, and past social history.       Objective   Vital Signs:   /94 (BP Location: Left arm, Patient Position: Sitting, Cuff Size: Adult)   Pulse 78   Temp 97.8 °F (36.6 °C) (Temporal)   Ht 170.2 cm (67\")   Wt 101 kg (222 lb)   SpO2 98%   BMI 34.77 kg/m²     Body mass index is 34.77 kg/m².    All labs, imaging, test results, and specialty provider notes reviewed with patient.     Physical Exam  Vitals reviewed.   Constitutional:       Appearance: Normal appearance.   Cardiovascular:      Rate and Rhythm: Normal rate and regular rhythm.      Pulses: Normal pulses.      Heart sounds: Normal heart sounds.   Pulmonary:      Effort: Pulmonary effort is normal.      Breath sounds: Normal breath sounds.   Neurological:      General: No focal deficit present.      Mental Status: She is alert and oriented to person, place, and time.                  BMI is >= 30 and <35. (Class 1 Obesity). The following options were offered after discussion;: exercise counseling/recommendations and nutrition counseling/recommendations            Assessment and Plan:  Diagnoses and all orders for this visit:    1. Dysfunction of both eustachian tubes (Primary)  Comments:  Likely cause of fluid buildup, " will give a steroid injection today.  Orders:  -     methylPREDNISolone acetate (DEPO-medrol) injection 40 mg    2. Primary hypertension  Assessment & Plan:  Increased, but has not taken medication.  Did discuss with patient to monitor blood pressure at home, likely exacerbated and worsened due to the amount of caffeine that she drinks routinely.  So did discuss with her about cutting back on this, I did discuss the risks of continued excessive caffeine use and risk.      3. Moderate episode of recurrent major depressive disorder  Assessment & Plan:  See anxiety.    Orders:  -     buPROPion XL (Wellbutrin XL) 300 MG 24 hr tablet; Take 1 tablet by mouth Daily.  Dispense: 90 tablet; Refill: 1    4. Anxiety  Assessment & Plan:  Not controlled and worsened through the holidays.  We will provide Xanax only temporarily and as needed, discussed the risks of this, UDS/consent reviewed, Carlos reviewed.  Discussed not to take with Ambien.  We will continue on Wellbutrin and Lexapro, will increase Wellbutrin.  Strict ER precautions for any suicidal thoughts or ideation.    Orders:  -     buPROPion XL (Wellbutrin XL) 300 MG 24 hr tablet; Take 1 tablet by mouth Daily.  Dispense: 90 tablet; Refill: 1  -     ALPRAZolam (Xanax) 0.5 MG tablet; Take 1 tablet by mouth 2 (Two) Times a Day As Needed for Anxiety.  Dispense: 30 tablet; Refill: 0    5. Bilateral lower extremity edema    6. Panic attacks  -     ALPRAZolam (Xanax) 0.5 MG tablet; Take 1 tablet by mouth 2 (Two) Times a Day As Needed for Anxiety.  Dispense: 30 tablet; Refill: 0    7. Class 1 obesity due to excess calories with serious comorbidity and body mass index (BMI) of 34.0 to 34.9 in adult  Assessment & Plan:  Think a lot of patient's issues are coming from the excessive caffeine that she is drinking, and thus causing her to have an elevated blood pressure, which likely causes her to retain fluid.  Discussed with her extensively about cutting back on the caffeine,  increasing exercise, and trying to drink more water.    May consider phentermine if able to come off of all of caffeine, did discuss the risks of that with excessive caffeine use.            Follow Up:  Return in about 6 weeks (around 1/2/2024).    Patient was given instructions and counseling regarding her condition or for health maintenance advice. Please see specific information pulled into the AVS if appropriate.

## 2023-11-21 NOTE — ASSESSMENT & PLAN NOTE
Increased, but has not taken medication.  Did discuss with patient to monitor blood pressure at home, likely exacerbated and worsened due to the amount of caffeine that she drinks routinely.  So did discuss with her about cutting back on this, I did discuss the risks of continued excessive caffeine use and risk.

## 2023-11-21 NOTE — ASSESSMENT & PLAN NOTE
Think a lot of patient's issues are coming from the excessive caffeine that she is drinking, and thus causing her to have an elevated blood pressure, which likely causes her to retain fluid.  Discussed with her extensively about cutting back on the caffeine, increasing exercise, and trying to drink more water.    May consider phentermine if able to come off of all of caffeine, did discuss the risks of that with excessive caffeine use.

## 2023-11-21 NOTE — ASSESSMENT & PLAN NOTE
Not controlled and worsened through the holidays.  We will provide Xanax only temporarily and as needed, discussed the risks of this, UDS/consent reviewed, Carlos reviewed.  Discussed not to take with Ambien.  We will continue on Wellbutrin and Lexapro, will increase Wellbutrin.  Strict ER precautions for any suicidal thoughts or ideation.

## 2024-01-03 DIAGNOSIS — F51.01 PRIMARY INSOMNIA: ICD-10-CM

## 2024-01-03 RX ORDER — ZOLPIDEM TARTRATE 10 MG/1
10 TABLET ORAL NIGHTLY PRN
Qty: 30 TABLET | Refills: 2 | Status: SHIPPED | OUTPATIENT
Start: 2024-01-03

## 2024-01-11 ENCOUNTER — TELEPHONE (OUTPATIENT)
Dept: FAMILY MEDICINE CLINIC | Facility: CLINIC | Age: 49
End: 2024-01-11
Payer: COMMERCIAL

## 2024-01-11 NOTE — TELEPHONE ENCOUNTER
Patient came in wanting to get a blood pressure check. Patient states last night her reading was 210/100 and today she is having numbness in the face. After speaking to clinical advised patient to go the ER, let her know that prolonging her getting there with the symptoms she having and potential of stroke that it would be best for her to go straight to the ER. Patient then question if she should drive. Let her know that we could call EMS to have her transfer there. Patient then walked out.

## 2024-01-12 ENCOUNTER — APPOINTMENT (OUTPATIENT)
Dept: GENERAL RADIOLOGY | Facility: HOSPITAL | Age: 49
End: 2024-01-12
Payer: COMMERCIAL

## 2024-01-12 ENCOUNTER — HOSPITAL ENCOUNTER (EMERGENCY)
Facility: HOSPITAL | Age: 49
Discharge: HOME | End: 2024-01-12
Attending: EMERGENCY MEDICINE
Payer: COMMERCIAL

## 2024-01-12 VITALS
RESPIRATION RATE: 16 BRPM | HEIGHT: 67 IN | DIASTOLIC BLOOD PRESSURE: 83 MMHG | OXYGEN SATURATION: 96 % | WEIGHT: 222.66 LBS | HEART RATE: 67 BPM | SYSTOLIC BLOOD PRESSURE: 125 MMHG | TEMPERATURE: 98.3 F | BODY MASS INDEX: 34.95 KG/M2

## 2024-01-12 DIAGNOSIS — I15.9 SECONDARY HYPERTENSION: Primary | ICD-10-CM

## 2024-01-12 LAB
ALBUMIN SERPL-MCNC: 4.4 G/DL (ref 3.5–5.2)
ALBUMIN/GLOB SERPL: 1.3 G/DL
ALP SERPL-CCNC: 70 U/L (ref 39–117)
ALT SERPL W P-5'-P-CCNC: 16 U/L (ref 1–33)
ANION GAP SERPL CALCULATED.3IONS-SCNC: 10.7 MMOL/L (ref 5–15)
AST SERPL-CCNC: 21 U/L (ref 1–32)
BASOPHILS # BLD AUTO: 0.09 10*3/MM3 (ref 0–0.2)
BASOPHILS NFR BLD AUTO: 0.8 % (ref 0–1.5)
BILIRUB SERPL-MCNC: 0.4 MG/DL (ref 0–1.2)
BUN SERPL-MCNC: 14 MG/DL (ref 6–20)
BUN/CREAT SERPL: 18.7 (ref 7–25)
CALCIUM SPEC-SCNC: 8.6 MG/DL (ref 8.6–10.5)
CHLORIDE SERPL-SCNC: 101 MMOL/L (ref 98–107)
CO2 SERPL-SCNC: 24.3 MMOL/L (ref 22–29)
CREAT SERPL-MCNC: 0.75 MG/DL (ref 0.57–1)
DEPRECATED RDW RBC AUTO: 36.2 FL (ref 37–54)
EGFRCR SERPLBLD CKD-EPI 2021: 99.6 ML/MIN/1.73
EOSINOPHIL # BLD AUTO: 0.41 10*3/MM3 (ref 0–0.4)
EOSINOPHIL NFR BLD AUTO: 3.8 % (ref 0.3–6.2)
ERYTHROCYTE [DISTWIDTH] IN BLOOD BY AUTOMATED COUNT: 11.9 % (ref 12.3–15.4)
GEN 5 2HR TROPONIN T REFLEX: <6 NG/L
GLOBULIN UR ELPH-MCNC: 3.3 GM/DL
GLUCOSE SERPL-MCNC: 97 MG/DL (ref 65–99)
HCT VFR BLD AUTO: 40.1 % (ref 34–46.6)
HGB BLD-MCNC: 13.7 G/DL (ref 12–15.9)
HOLD SPECIMEN: NORMAL
HOLD SPECIMEN: NORMAL
IMM GRANULOCYTES # BLD AUTO: 0.03 10*3/MM3 (ref 0–0.05)
IMM GRANULOCYTES NFR BLD AUTO: 0.3 % (ref 0–0.5)
LIPASE SERPL-CCNC: 37 U/L (ref 13–60)
LYMPHOCYTES # BLD AUTO: 3.63 10*3/MM3 (ref 0.7–3.1)
LYMPHOCYTES NFR BLD AUTO: 34 % (ref 19.6–45.3)
MAGNESIUM SERPL-MCNC: 2.2 MG/DL (ref 1.6–2.6)
MCH RBC QN AUTO: 28.7 PG (ref 26.6–33)
MCHC RBC AUTO-ENTMCNC: 34.2 G/DL (ref 31.5–35.7)
MCV RBC AUTO: 83.9 FL (ref 79–97)
MONOCYTES # BLD AUTO: 0.82 10*3/MM3 (ref 0.1–0.9)
MONOCYTES NFR BLD AUTO: 7.7 % (ref 5–12)
NEUTROPHILS NFR BLD AUTO: 5.69 10*3/MM3 (ref 1.7–7)
NEUTROPHILS NFR BLD AUTO: 53.4 % (ref 42.7–76)
NRBC BLD AUTO-RTO: 0 /100 WBC (ref 0–0.2)
NT-PROBNP SERPL-MCNC: <36 PG/ML (ref 0–450)
PLATELET # BLD AUTO: 280 10*3/MM3 (ref 140–450)
PMV BLD AUTO: 9.9 FL (ref 6–12)
POTASSIUM SERPL-SCNC: 4.4 MMOL/L (ref 3.5–5.2)
PROT SERPL-MCNC: 7.7 G/DL (ref 6–8.5)
QT INTERVAL: 448 MS
QT INTERVAL: 450 MS
QTC INTERVAL: 473 MS
QTC INTERVAL: 483 MS
RBC # BLD AUTO: 4.78 10*6/MM3 (ref 3.77–5.28)
SODIUM SERPL-SCNC: 136 MMOL/L (ref 136–145)
TROPONIN T DELTA: NORMAL
TROPONIN T SERPL HS-MCNC: <6 NG/L
WBC NRBC COR # BLD AUTO: 10.67 10*3/MM3 (ref 3.4–10.8)
WHOLE BLOOD HOLD COAG: NORMAL
WHOLE BLOOD HOLD SPECIMEN: NORMAL

## 2024-01-12 PROCEDURE — 93005 ELECTROCARDIOGRAM TRACING: CPT | Performed by: EMERGENCY MEDICINE

## 2024-01-12 PROCEDURE — 84484 ASSAY OF TROPONIN QUANT: CPT | Performed by: EMERGENCY MEDICINE

## 2024-01-12 PROCEDURE — 83735 ASSAY OF MAGNESIUM: CPT | Performed by: EMERGENCY MEDICINE

## 2024-01-12 PROCEDURE — 36415 COLL VENOUS BLD VENIPUNCTURE: CPT

## 2024-01-12 PROCEDURE — 83880 ASSAY OF NATRIURETIC PEPTIDE: CPT | Performed by: EMERGENCY MEDICINE

## 2024-01-12 PROCEDURE — 93005 ELECTROCARDIOGRAM TRACING: CPT

## 2024-01-12 PROCEDURE — 99284 EMERGENCY DEPT VISIT MOD MDM: CPT

## 2024-01-12 PROCEDURE — 83690 ASSAY OF LIPASE: CPT | Performed by: EMERGENCY MEDICINE

## 2024-01-12 PROCEDURE — 80053 COMPREHEN METABOLIC PANEL: CPT | Performed by: EMERGENCY MEDICINE

## 2024-01-12 PROCEDURE — 71045 X-RAY EXAM CHEST 1 VIEW: CPT

## 2024-01-12 PROCEDURE — 85025 COMPLETE CBC W/AUTO DIFF WBC: CPT | Performed by: EMERGENCY MEDICINE

## 2024-01-12 RX ORDER — SODIUM CHLORIDE 0.9 % (FLUSH) 0.9 %
10 SYRINGE (ML) INJECTION AS NEEDED
Status: DISCONTINUED | OUTPATIENT
Start: 2024-01-12 | End: 2024-01-12 | Stop reason: HOSPADM

## 2024-01-12 RX ORDER — ASPIRIN 81 MG/1
324 TABLET, CHEWABLE ORAL ONCE
Status: DISCONTINUED | OUTPATIENT
Start: 2024-01-12 | End: 2024-01-12

## 2024-01-12 RX ORDER — AMLODIPINE BESYLATE 5 MG/1
5 TABLET ORAL DAILY
Qty: 20 TABLET | Refills: 0 | Status: SHIPPED | OUTPATIENT
Start: 2024-01-12 | End: 2024-01-16

## 2024-01-12 NOTE — ED NOTES
Pt arrived via PV with c/o CP that started yesterday and has gotten progressively worse. Pt reports BP being elevated the last three days. Pt reports nausea as associated symptom.

## 2024-01-12 NOTE — Clinical Note
[facility] EMERGENCY ROOM  [address]  [place] [address]  Phone: [phone]    Sushila Stark was seen and treated in our emergency department on 1/12/2024.  She may return to work on 01/13/2024.         Thank you for choosing [facility].    Aiyana Vargas RN

## 2024-01-12 NOTE — Clinical Note
[facility] EMERGENCY ROOM  [address]  [place] [address]  Phone: [phone]    Sushila Stark was seen and treated in our emergency department on 1/12/2024.  She may return to work on 01/13/2024.         Thank you for choosing [facility].    Aiyana Vargas RN

## 2024-01-12 NOTE — ED PROVIDER NOTES
Time: 8:59 AM EST  Date of encounter:  1/12/2024  Independent Historian/Clinical History and Information was obtained by:   Patient    History is limited by: N/A    Chief Complaint: Hypertension      History of Present Illness:  Patient is a 48 y.o. year old female who presents to the emergency department for evaluation of hypertension for the past week.  Patient reports chest fullness with no overt chest pain.  Patient denies shortness of breath.  Patient has no nausea or vomiting.  Patient denies subjective neurological deficit.  Patient has no dysuria urinary frequency.  She states that she has been compliant with her medications.    HPI    Patient Care Team  Primary Care Provider: Mendy Resendez APRN    Past Medical History:     Allergies   Allergen Reactions    Codeine Other (See Comments) and Nausea Only     Chest pain.     Penicillins Rash     AS A BABY HAD RASH     Past Medical History:   Diagnosis Date    Allergic rhinitis due to allergen 02/14/2017    Anal high risk HPV DNA test positive     Anxiety     Anxiety disorder 02/14/2017    Depression     Essential hypertension 11/01/2018    Hx of abnormal cervical Pap smear     Hyperlipidemia     Hypertension     Impaired fasting glucose 01/13/2021    Insomnia, unspecified 04/13/2020    Kidney stone     Migraine headache     Nicotine dependence 09/24/2018    Psychiatric illness     Streptococcal sore throat     Ureterolithiasis 08/18/2016    Vitamin D deficiency 08/20/2020     Past Surgical History:   Procedure Laterality Date    CERVICAL CONE BIOPSY      CHOLECYSTECTOMY      CYSTOSCOPY      WITH BILATERAL RETROGRADE PYELOGRAPHY    HYSTERECTOMY       Family History   Problem Relation Age of Onset    Hypertension Mother     Other Other         BLADDER CANCER MALIGNANT    Diabetes type II Other         MELLITUS       Home Medications:  Prior to Admission medications    Medication Sig Start Date End Date Taking? Authorizing Provider   ALPRAZolam (Xanax) 0.5 MG  tablet Take 1 tablet by mouth 2 (Two) Times a Day As Needed for Anxiety. 11/21/23   Mendy Resendez APRN   bumetanide (BUMEX) 1 MG tablet Take 1 tablet by mouth Daily. 8/17/23   Mendy Resendez APRN   buPROPion XL (Wellbutrin XL) 300 MG 24 hr tablet Take 1 tablet by mouth Daily. 11/21/23   Mendy Resendez APRN   escitalopram (Lexapro) 20 MG tablet Take 1 tablet by mouth Daily. 8/17/23   Mendy Resendez APRN   lisinopril (PRINIVIL,ZESTRIL) 20 MG tablet TAKE ONE TABLET BY MOUTH DAILY 9/20/23   Mendy Resendez APRN   potassium chloride (K-DUR,KLOR-CON) 20 MEQ CR tablet Take 1 tablet by mouth Daily. 8/17/23   Mendy Resendez APRN   zolpidem (AMBIEN) 10 MG tablet Take 1 tablet by mouth At Night As Needed for Sleep. 1/3/24   Mendy Resendez APRN        Social History:   Social History     Tobacco Use    Smoking status: Every Day     Packs/day: 1.00     Years: 5.00     Additional pack years: 0.00     Total pack years: 5.00     Types: Cigarettes    Smokeless tobacco: Never    Tobacco comments:     STARTED AT AGE 22, STILL SMOKING, STOPPED SMOKING AT AGE 36 AND STARTED AGAIN AS OF/ /   Vaping Use    Vaping Use: Never used   Substance Use Topics    Alcohol use: Yes     Comment: Rarely.     Drug use: Not Currently         Review of Systems:  Review of Systems   Constitutional:  Negative for chills and fever.   HENT:  Negative for congestion, rhinorrhea and sore throat.    Eyes:  Negative for pain and visual disturbance.   Respiratory:  Negative for apnea, cough, chest tightness and shortness of breath.    Cardiovascular:  Negative for chest pain and palpitations.   Gastrointestinal:  Negative for abdominal pain, diarrhea, nausea and vomiting.   Genitourinary:  Negative for difficulty urinating and dysuria.   Musculoskeletal:  Negative for joint swelling and myalgias.   Skin:  Negative for color change.   Neurological:  Negative for seizures and headaches.   Psychiatric/Behavioral: Negative.     All  "other systems reviewed and are negative.       Physical Exam:  /83   Pulse 67   Temp 98.3 °F (36.8 °C) (Oral)   Resp 16   Ht 170.2 cm (67\")   Wt 101 kg (222 lb 10.6 oz)   SpO2 96%   BMI 34.87 kg/m²     Physical Exam  Vitals and nursing note reviewed.   Constitutional:       General: She is not in acute distress.     Appearance: Normal appearance. She is not toxic-appearing.   HENT:      Head: Normocephalic and atraumatic.      Jaw: There is normal jaw occlusion.   Eyes:      General: Lids are normal.      Extraocular Movements: Extraocular movements intact.      Conjunctiva/sclera: Conjunctivae normal.      Pupils: Pupils are equal, round, and reactive to light.   Cardiovascular:      Rate and Rhythm: Normal rate and regular rhythm.      Pulses: Normal pulses.      Heart sounds: Normal heart sounds.   Pulmonary:      Effort: Pulmonary effort is normal. No respiratory distress.      Breath sounds: Normal breath sounds. No wheezing or rhonchi.   Abdominal:      General: Abdomen is flat.      Palpations: Abdomen is soft.      Tenderness: There is no abdominal tenderness. There is no guarding or rebound.   Musculoskeletal:         General: Normal range of motion.      Cervical back: Normal range of motion and neck supple.      Right lower leg: No edema.      Left lower leg: No edema.   Skin:     General: Skin is warm and dry.   Neurological:      Mental Status: She is alert and oriented to person, place, and time. Mental status is at baseline.   Psychiatric:         Mood and Affect: Mood normal.                  Procedures:  Procedures      Medical Decision Making:      Comorbidities that affect care:    Hypertension    External Notes reviewed:    Previous Clinic Note: Patient last saw her PCP for follow-up of anxiety and depression.      The following orders were placed and all results were independently analyzed by me:  Orders Placed This Encounter   Procedures    XR Chest 1 View    [place] Draw    High " Sensitivity Troponin T    Comprehensive Metabolic Panel    Lipase    BNP    Magnesium    CBC Auto Differential    High Sensitivity Troponin T 2Hr    Undress & Gown    Continuous Pulse Oximetry    ECG 12 Lead ED Triage Standing Order; Chest Pain    CBC & Differential    Green Top (Gel)    Lavender Top    Gold Top - SST    Light Blue Top       Medications Given in the Emergency Department:  Medications - No data to display       ED Course:         Labs:    Lab Results (last 24 hours)       Procedure Component Value Units Date/Time    High Sensitivity Troponin T [[id number]]  (Normal) Collected: 01/12/24 0732    Specimen: Blood from Arm, Left Updated: 01/12/24 0759     HS Troponin T <6 ng/L     Narrative:      High Sensitive Troponin T Reference Range:  <14.0 ng/L- Negative Female for AMI  <22.0 ng/L- Negative Male for AMI  >=14 - Abnormal Female indicating possible myocardial injury.  >=22 - Abnormal Male indicating possible myocardial injury.   Clinicians would have to utilize clinical acumen, EKG, Troponin, and serial changes to determine if it is an Acute Myocardial Infarction or myocardial injury due to an underlying chronic condition.         Comprehensive Metabolic Panel [[id number]] Collected: 01/12/24 0732    Specimen: Blood from Arm, Left Updated: 01/12/24 0801     Glucose 97 mg/dL      BUN 14 mg/dL      Creatinine 0.75 mg/dL      Sodium 136 mmol/L      Potassium 4.4 mmol/L      Comment: Slight hemolysis detected by analyzer. Result may be falsely elevated.        Chloride 101 mmol/L      CO2 24.3 mmol/L      Calcium 8.6 mg/dL      Total Protein 7.7 g/dL      Albumin 4.4 g/dL      ALT (SGPT) 16 U/L      AST (SGOT) 21 U/L      Comment: Slight hemolysis detected by analyzer. Result may be falsely elevated.        Alkaline Phosphatase 70 U/L      Total Bilirubin 0.4 mg/dL      Globulin 3.3 gm/dL      A/G Ratio 1.3 g/dL      BUN/Creatinine Ratio 18.7     Anion Gap 10.7 mmol/L      eGFR 99.6 mL/min/1.73      Narrative:      GFR Normal >60  Chronic Kidney Disease <60  Kidney Failure <15      Lipase [[id number]]  (Normal) Collected: 01/12/24 0732    Specimen: Blood from Arm, Left Updated: 01/12/24 0759     Lipase 37 U/L     BNP [[id number]]  (Normal) Collected: 01/12/24 0732    Specimen: Blood from Arm, Left Updated: 01/12/24 0757     proBNP <36.0 pg/mL     Narrative:      This assay is used as an aid in the diagnosis of individuals suspected of having heart failure. It can be used as an aid in the diagnosis of acute decompensated heart failure (ADHF) in patients presenting with signs and symptoms of ADHF to the emergency department (ED). In addition, NT-proBNP of <300 pg/mL indicates ADHF is not likely.    Age Range Result Interpretation  NT-proBNP Concentration (pg/mL:      <50             Positive            >450                   Gray                 300-450                    Negative             <300    50-75           Positive            >900                  Gray                300-900                  Negative            <300      >75             Positive            >1800                  Gray                300-1800                  Negative            <300    Magnesium [[id number]]  (Normal) Collected: 01/12/24 0732    Specimen: Blood from Arm, Left Updated: 01/12/24 0800     Magnesium 2.2 mg/dL     CBC & Differential [[id number]]  (Abnormal) Collected: 01/12/24 0759    Specimen: Blood from Arm, Left Updated: 01/12/24 0805    Narrative:      The following orders were created for panel order CBC & Differential.  Procedure                               Abnormality         Status                     ---------                               -----------         ------                     CBC Auto Differential[[id number]]        Abnormal            Final result                 Please view results for these tests on the individual orders.    CBC Auto Differential [[id number]]  (Abnormal) Collected: 01/12/24 0759    Specimen:  Blood from Arm, Left Updated: 01/12/24 0805     WBC 10.67 10*3/mm3      RBC 4.78 10*6/mm3      Hemoglobin 13.7 g/dL      Hematocrit 40.1 %      MCV 83.9 fL      MCH 28.7 pg      MCHC 34.2 g/dL      RDW 11.9 %      RDW-SD 36.2 fl      MPV 9.9 fL      Platelets 280 10*3/mm3      Neutrophil % 53.4 %      Lymphocyte % 34.0 %      Monocyte % 7.7 %      Eosinophil % 3.8 %      Basophil % 0.8 %      Immature Grans % 0.3 %      Neutrophils, Absolute 5.69 10*3/mm3      Lymphocytes, Absolute 3.63 10*3/mm3      Monocytes, Absolute 0.82 10*3/mm3      Eosinophils, Absolute 0.41 10*3/mm3      Basophils, Absolute 0.09 10*3/mm3      Immature Grans, Absolute 0.03 10*3/mm3      nRBC 0.0 /100 WBC     High Sensitivity Troponin T 2Hr [[id number]] Collected: 01/12/24 0941    Specimen: Blood Updated: 01/12/24 1006     HS Troponin T <6 ng/L      Troponin T Delta --     Comment: Unable to calculate.       Narrative:      High Sensitive Troponin T Reference Range:  <14.0 ng/L- Negative Female for AMI  <22.0 ng/L- Negative Male for AMI  >=14 - Abnormal Female indicating possible myocardial injury.  >=22 - Abnormal Male indicating possible myocardial injury.   Clinicians would have to utilize clinical acumen, EKG, Troponin, and serial changes to determine if it is an Acute Myocardial Infarction or myocardial injury due to an underlying chronic condition.                  Imaging:    XR Chest 1 View    Result Date: 1/12/2024  PROCEDURE: XR CHEST 1 VW  COMPARISON: [facility], CR, CHEST PA/AP & LAT 2V, 1/02/2019, 12:02.  [facility], CR, XR CHEST 1 VW, 5/26/2022, 10:15.  INDICATIONS: Chest Pain  FINDINGS:  The heart is normal in size.  The lungs are well-expanded and free of infiltrates.  Bony structures appear intact.       No active disease is seen.       JULIET OROZCO MD       Electronically Signed and Approved By: JULIET OROZCO MD on 1/12/2024 at 8:03                Differential Diagnosis and  Discussion:    Metabolic: Differential diagnosis includes but is not limited to hypertension, hyperglycemia, hyperkalemia, hypocalcemia, metabolic acidosis, hypokalemia, hypoglycemia, malnutrition, hypothyroidism, hyperthyroidism, and adrenal insufficiency.     All labs were reviewed and interpreted by me.  EKG was interpreted by me.    MDM     The patient presents to the ED with hypertension.  The patient´s CBC that was reviewed and interpreted by me shows no abnormalities of critical concern. Of note, there is no anemia requiring a blood transfusion and the platelet count is acceptable.  The patient´s CMP that was reviewed and interpretted by me shows no abnormalities of critical concern. Of note, the patient´s sodium and potassium are acceptable. The patient´s liver enzymes are unremarkable. The patient´s renal function (creatinine) is preserved. The patient has a normal anion gap.  The patient was placed on a monitor in the ED. The blood pressure is much improved with ED treatment. The patient denies chest pain. The patient has a normal neurological exam and has mental status at baseline. Upon re-examination, the patient has no signs or symptoms of acute end organ damage.  The patient was advised of the importance of medical compliance with an emphasis in awareness of their daily sodium intake. The patient was counseled that elevated blood pressure is detrimental to their heart, eyes, kidneys, and may lead to a stroke. The patient was advised to check their blood pressure regularly and follow up as an outpatient regarding this matter. The patient was counseled to return to the ED for sudden or severe headache, numbness or tingling on one side of the body, facial droop, difficulty speaking, chest pain, or shortness of breath. The patient's condition is stable and appropriate for discharge. The patient will pursue further outpatient evaluation with the primary care physician or other designated or consulting  physician as indicated in the discharge instructions.          Patient Care Considerations:    PERC: I used the PERC score to risk stratify the patient for PE and a CT of the chest was considered but ultimately not indicated in today's visit.      Consultants/Shared Management Plan:    None    Social Determinants of Health:    Patient is independent, reliable, and has access to care.       Disposition and Care Coordination:    Discharged: I considered escalation of care by admitting this patient for observation, however the patient has improved and is suitable and  stable for discharge.    I have explained the patient´s condition, diagnoses and treatment plan based on the information available to me at this time. I have answered questions and addressed any concerns. The patient has a good  understanding of the patient´s diagnosis, condition, and treatment plan as can be expected at this point. The vital signs have been stable. The patient´s condition is stable and appropriate for discharge from the emergency department.      The patient will pursue further outpatient evaluation with the primary care physician or other designated or consulting physician as outlined in the discharge instructions. They are agreeable to this plan of care and follow-up instructions have been explained in detail. The patient has received these instructions in written format and have expressed an understanding of the discharge instructions. The patient is aware that any significant change in condition or worsening of symptoms should prompt an immediate return to this or the closest emergency department or call to 911.  I have explained discharge medications and the need for follow up with the patient/caretakers. This was also printed in the discharge instructions. Patient was discharged with the following medications and follow up:      Medication List        New Prescriptions      amLODIPine 5 MG tablet  Commonly known as: NORVASC  Take 1  tablet by mouth Daily.               Where to Get Your Medications        These medications were sent to [facility] - [place], [place] - [address] - [phone]  - [phone] FX  [address] [facility] [place] [address]      Phone: [phone]   amLODIPine 5 MG tablet      Mal MendySHIRAZ  [facility] [address] [id number] [phone]    In 2 days         Final diagnoses:   Secondary hypertension        ED Disposition       ED Disposition   Discharge    Condition   Stable    Comment   --               This medical record created using voice recognition software.             Zee Nuñez MD  01/12/24 7181

## 2024-01-16 ENCOUNTER — OFFICE VISIT (OUTPATIENT)
Dept: FAMILY MEDICINE CLINIC | Facility: CLINIC | Age: 49
End: 2024-01-16
Payer: COMMERCIAL

## 2024-01-16 VITALS
BODY MASS INDEX: 34.76 KG/M2 | DIASTOLIC BLOOD PRESSURE: 98 MMHG | TEMPERATURE: 97.3 F | HEIGHT: 67 IN | HEART RATE: 83 BPM | SYSTOLIC BLOOD PRESSURE: 156 MMHG | OXYGEN SATURATION: 99 % | WEIGHT: 221.5 LBS

## 2024-01-16 DIAGNOSIS — I10 PRIMARY HYPERTENSION: Primary | ICD-10-CM

## 2024-01-16 DIAGNOSIS — R60.0 BILATERAL LOWER EXTREMITY EDEMA: ICD-10-CM

## 2024-01-16 DIAGNOSIS — G44.209 ACUTE NON INTRACTABLE TENSION-TYPE HEADACHE: ICD-10-CM

## 2024-01-16 PROCEDURE — 99213 OFFICE O/P EST LOW 20 MIN: CPT

## 2024-01-16 RX ORDER — HYDROXYZINE HYDROCHLORIDE 25 MG/1
25 TABLET, FILM COATED ORAL 3 TIMES DAILY PRN
COMMUNITY
Start: 2024-01-10 | End: 2024-01-16

## 2024-01-16 RX ORDER — BUSPIRONE HYDROCHLORIDE 5 MG/1
1 TABLET ORAL 3 TIMES DAILY
COMMUNITY
Start: 2024-01-10 | End: 2024-01-16

## 2024-01-16 RX ORDER — AMLODIPINE BESYLATE 10 MG/1
10 TABLET ORAL NIGHTLY
Qty: 90 TABLET | Refills: 1 | Status: SHIPPED | OUTPATIENT
Start: 2024-01-16

## 2024-01-16 NOTE — PROGRESS NOTES
"Sushila Stark presents to [facility] with complaints of persistently elevated blood pressure, chronic headache.      Hypertension      This is a 48-year-old female who presents to the clinic with complaints of persistent elevated blood pressure and chronic headache.    Patient has been having ongoing issues with high blood pressure.  Is actually had high blood pressure for about 20 years, has been on lisinopril for that amount of time, but we have been working on her blood pressure here in the office because it has been elevated on several former occasions.  Patient has been known to drink substantial amounts of caffeine, but states that she has drastically cut back on this.  She is even drastically cut back on her cigarette use as well.  Patient states that she just keeps having these persistent headaches, and has correlated it now with that they are related to her high blood pressure.  States that she has had issues and checking it at home she is noticed that her blood pressures been as high as the 180s systolic, she even had numbers as high as 200/120 last Friday.  Actually had issues with numbness and tingling and ended up going to the ER for evaluation.  While there, they did start her on her amlodipine.  She is tolerating the medication well, but does still feel like she is waking up every morning with a headache.  Denies any chest pain or shortness of breath.  Denies any dizziness.  But does not feel well at all.  Thinks that it is finally starting to hit her.    The following portions of the patient's history were personally reviewed and updated as appropriate: allergies, current medications, past medical history, past surgical history, past family history, and past social history.       Objective   Vital Signs:   /98 (BP Location: Left arm, Patient Position: Sitting, Cuff Size: Adult)   Pulse 83   Temp 97.3 °F (36.3 °C)   Ht 170.2 cm (67\")   Wt 100 kg (221 lb 8 oz) "   SpO2 99%   BMI 34.69 kg/m²     Body mass index is 34.69 kg/m².    All labs, imaging, test results, and specialty provider notes reviewed with patient.     Physical Exam  Vitals reviewed.   Constitutional:       Appearance: Normal appearance.   Cardiovascular:      Rate and Rhythm: Normal rate and regular rhythm.      Pulses: Normal pulses.      Heart sounds: Normal heart sounds.   Pulmonary:      Effort: Pulmonary effort is normal.      Breath sounds: Normal breath sounds.   Neurological:      General: No focal deficit present.      Mental Status: She is alert and oriented to person, place, and time.                Assessment and Plan:  Diagnoses and all orders for this visit:    1. Primary hypertension (Primary)  -     amLODIPine (NORVASC) 10 MG tablet; Take 1 tablet by mouth Every Night.  Dispense: 90 tablet; Refill: 1    2. Bilateral lower extremity edema    3. Acute non intractable tension-type headache      Not controlled and causing patient's headache.  Will start with changing her amlodipine to be taken at night, increase it to 10 mg, we will continue on lisinopril at current dose of 20 mg.  Patient to continue on Bumex, does state that her lower leg swelling is improved.  Patient to monitor blood pressure for next 2 weeks, checking morning and night, bring me a log and we will adjust further.  Likely will take her off the lisinopril and transition her over to valsartan, but will consider this change after we get results of blood pressure log when she brings it in.  Discussed with her extensively about diet changes, limiting caffeine intake, trying to limit cigarette use, and decreasing high sodium foods.  Strict ER precautions discussed.    Follow Up:  Return in about 2 weeks (around 1/30/2024).    Patient was given instructions and counseling regarding her condition or for health maintenance advice. Please see specific information pulled into the AVS if appropriate.

## 2024-01-31 ENCOUNTER — OFFICE VISIT (OUTPATIENT)
Dept: FAMILY MEDICINE CLINIC | Facility: CLINIC | Age: 49
End: 2024-01-31
Payer: COMMERCIAL

## 2024-01-31 VITALS
HEIGHT: 67 IN | BODY MASS INDEX: 34.89 KG/M2 | TEMPERATURE: 98.3 F | HEART RATE: 88 BPM | OXYGEN SATURATION: 98 % | SYSTOLIC BLOOD PRESSURE: 142 MMHG | DIASTOLIC BLOOD PRESSURE: 82 MMHG | WEIGHT: 222.3 LBS

## 2024-01-31 DIAGNOSIS — I10 PRIMARY HYPERTENSION: Primary | ICD-10-CM

## 2024-01-31 PROCEDURE — 99213 OFFICE O/P EST LOW 20 MIN: CPT

## 2024-01-31 RX ORDER — VALSARTAN 160 MG/1
160 TABLET ORAL DAILY
Qty: 30 TABLET | Refills: 2 | Status: SHIPPED | OUTPATIENT
Start: 2024-01-31

## 2024-01-31 NOTE — PROGRESS NOTES
"Sushila Stark presents to [facility] who presents to the clinic for 2-week follow-up.    History of Present Illness  This is a 48-year-old female who presents to clinic for 2-week follow-up.    Hypertension: Patient was seen by me about 2 weeks ago, has been having persistently elevated blood pressures, and have been adjusting her medications.  We then increased her amlodipine at last visit to 10 mg, kept her on lisinopril at 20 mg, patient did bring a log with her today.  Blood pressure is still elevated.  Blood pressure is somewhat better, she states that she has noticed a difference with decrease in her restless leg since her blood pressures been better controlled, she has quit smoking within these past 2 weeks, and states that she is drastically reduced her caffeine intake.  Patient blood pressure shows to be running pretty consistently in the 140s to 160s over 90s to 110s at home.  States that she can tell when her medication seems to wear off or is not as effective.  She feels extremely fatigued, and just does not feel good.  Denies any chest pain or shortness of breath.  Denies any vision changes or lightheadedness or dizziness.    The following portions of the patient's history were personally reviewed and updated as appropriate: allergies, current medications, past medical history, past surgical history, past family history, and past social history.       Objective   Vital Signs:   /82 (BP Location: Left arm, Patient Position: Sitting, Cuff Size: Adult)   Pulse 88   Temp 98.3 °F (36.8 °C) (Temporal)   Ht 170.2 cm (67\")   Wt 101 kg (222 lb 4.8 oz)   SpO2 98%   BMI 34.82 kg/m²     Body mass index is 34.82 kg/m².    All labs, imaging, test results, and specialty provider notes reviewed with patient.     Physical Exam  Vitals reviewed.   Constitutional:       Appearance: Normal appearance.   Cardiovascular:      Rate and Rhythm: Normal rate and regular rhythm.      " Pulses: Normal pulses.      Heart sounds: Normal heart sounds.   Pulmonary:      Effort: Pulmonary effort is normal.      Breath sounds: Normal breath sounds.   Neurological:      General: No focal deficit present.      Mental Status: She is alert and oriented to person, place, and time.                Assessment and Plan:  Diagnoses and all orders for this visit:    1. Primary hypertension (Primary)  -     valsartan (Diovan) 160 MG tablet; Take 1 tablet by mouth Daily.  Dispense: 30 tablet; Refill: 2      Improving but still not at goal.  We will transition patient from lisinopril to valsartan.  Discussed this medication use and side effects.  Patient take valsartan in the morning, take amlodipine at night.  Continue with smoking cessation, continue with low caffeine low-salt diet and increased exercise.  We will have patient follow-up in 2 weeks, may adjust dosage of valsartan at that time.    Follow Up:  Return in about 2 weeks (around 2/14/2024).    Patient was given instructions and counseling regarding her condition or for health maintenance advice. Please see specific information pulled into the AVS if appropriate.

## 2024-02-14 ENCOUNTER — OFFICE VISIT (OUTPATIENT)
Dept: FAMILY MEDICINE CLINIC | Facility: CLINIC | Age: 49
End: 2024-02-14
Payer: COMMERCIAL

## 2024-02-14 VITALS
BODY MASS INDEX: 35.35 KG/M2 | HEIGHT: 67 IN | HEART RATE: 83 BPM | SYSTOLIC BLOOD PRESSURE: 130 MMHG | DIASTOLIC BLOOD PRESSURE: 82 MMHG | OXYGEN SATURATION: 99 % | WEIGHT: 225.2 LBS | TEMPERATURE: 98 F

## 2024-02-14 DIAGNOSIS — I10 PRIMARY HYPERTENSION: Primary | ICD-10-CM

## 2024-02-14 DIAGNOSIS — R60.0 BILATERAL LOWER EXTREMITY EDEMA: ICD-10-CM

## 2024-02-14 PROCEDURE — 99213 OFFICE O/P EST LOW 20 MIN: CPT

## 2024-02-14 RX ORDER — BUMETANIDE 1 MG/1
2 TABLET ORAL DAILY
Qty: 180 TABLET | Refills: 1 | Status: SHIPPED | OUTPATIENT
Start: 2024-02-14

## 2024-02-22 DIAGNOSIS — I10 PRIMARY HYPERTENSION: Primary | ICD-10-CM

## 2024-02-22 DIAGNOSIS — R60.0 BILATERAL LOWER EXTREMITY EDEMA: ICD-10-CM

## 2024-02-22 DIAGNOSIS — R06.02 SHORTNESS OF BREATH: ICD-10-CM

## 2024-02-22 RX ORDER — FUROSEMIDE 80 MG
80 TABLET ORAL 2 TIMES DAILY
Qty: 60 TABLET | Refills: 2 | Status: SHIPPED | OUTPATIENT
Start: 2024-02-22

## 2024-02-27 RX ORDER — POTASSIUM CHLORIDE 20 MEQ/1
20 TABLET, EXTENDED RELEASE ORAL 2 TIMES DAILY
Qty: 180 TABLET | Refills: 1 | Status: SHIPPED | OUTPATIENT
Start: 2024-02-27

## 2024-03-04 ENCOUNTER — HOSPITAL ENCOUNTER (OUTPATIENT)
Dept: CARDIOLOGY | Facility: HOSPITAL | Age: 49
Discharge: HOME | End: 2024-03-04
Payer: COMMERCIAL

## 2024-03-04 VITALS
BODY MASS INDEX: 35.31 KG/M2 | DIASTOLIC BLOOD PRESSURE: 67 MMHG | HEIGHT: 67 IN | SYSTOLIC BLOOD PRESSURE: 107 MMHG | WEIGHT: 225 LBS | HEART RATE: 68 BPM

## 2024-03-04 DIAGNOSIS — I10 PRIMARY HYPERTENSION: ICD-10-CM

## 2024-03-04 DIAGNOSIS — R60.0 BILATERAL LOWER EXTREMITY EDEMA: ICD-10-CM

## 2024-03-04 DIAGNOSIS — R06.02 SHORTNESS OF BREATH: ICD-10-CM

## 2024-03-04 LAB
AORTIC ARCH: 2.6 CM
AORTIC DIMENSIONLESS INDEX: 0.6 (DI)
ASCENDING AORTA: 3.2 CM
BH CV ECHO MEAS - ACS: 2.03 CM
BH CV ECHO MEAS - AO MAX PG: 8.4 MMHG
BH CV ECHO MEAS - AO MEAN PG: 4 MMHG
BH CV ECHO MEAS - AO ROOT DIAM: 2.9 CM
BH CV ECHO MEAS - AO V2 MAX: 145 CM/SEC
BH CV ECHO MEAS - AO V2 VTI: 28.7 CM
BH CV ECHO MEAS - AVA(I,D): 1.92 CM2
BH CV ECHO MEAS - EDV(CUBED): 130.9 ML
BH CV ECHO MEAS - EDV(MOD-SP2): 96 ML
BH CV ECHO MEAS - EDV(MOD-SP4): 98 ML
BH CV ECHO MEAS - EF(MOD-BP): 60.3 %
BH CV ECHO MEAS - EF(MOD-SP2): 60.4 %
BH CV ECHO MEAS - EF(MOD-SP4): 58.2 %
BH CV ECHO MEAS - ESV(CUBED): 42.9 ML
BH CV ECHO MEAS - ESV(MOD-SP2): 38 ML
BH CV ECHO MEAS - ESV(MOD-SP4): 41 ML
BH CV ECHO MEAS - FS: 31.1 %
BH CV ECHO MEAS - IVS/LVPW: 1.05 CM
BH CV ECHO MEAS - IVSD: 1.02 CM
BH CV ECHO MEAS - LAT PEAK E' VEL: 8.7 CM/SEC
BH CV ECHO MEAS - LV MASS(C)D: 186.5 GRAMS
BH CV ECHO MEAS - LV MAX PG: 3.4 MMHG
BH CV ECHO MEAS - LV MEAN PG: 2 MMHG
BH CV ECHO MEAS - LV V1 MAX: 91.8 CM/SEC
BH CV ECHO MEAS - LV V1 VTI: 16.5 CM
BH CV ECHO MEAS - LVIDD: 5.1 CM
BH CV ECHO MEAS - LVIDS: 3.5 CM
BH CV ECHO MEAS - LVOT AREA: 3.3 CM2
BH CV ECHO MEAS - LVOT DIAM: 2.06 CM
BH CV ECHO MEAS - LVPWD: 0.97 CM
BH CV ECHO MEAS - MED PEAK E' VEL: 7.5 CM/SEC
BH CV ECHO MEAS - MV A DUR: 0.09 SEC
BH CV ECHO MEAS - MV A MAX VEL: 74.1 CM/SEC
BH CV ECHO MEAS - MV DEC SLOPE: 373.5 CM/SEC2
BH CV ECHO MEAS - MV DEC TIME: 0.21 SEC
BH CV ECHO MEAS - MV E MAX VEL: 80.8 CM/SEC
BH CV ECHO MEAS - MV E/A: 1.09
BH CV ECHO MEAS - MV MAX PG: 3 MMHG
BH CV ECHO MEAS - MV MEAN PG: 1.53 MMHG
BH CV ECHO MEAS - MV P1/2T: 70.6 MSEC
BH CV ECHO MEAS - MV V2 VTI: 23.5 CM
BH CV ECHO MEAS - MVA(P1/2T): 3.1 CM2
BH CV ECHO MEAS - MVA(VTI): 2.35 CM2
BH CV ECHO MEAS - PA ACC TIME: 0.16 SEC
BH CV ECHO MEAS - PA V2 MAX: 81.7 CM/SEC
BH CV ECHO MEAS - PULM A REVS DUR: 0.13 SEC
BH CV ECHO MEAS - PULM A REVS VEL: 27.8 CM/SEC
BH CV ECHO MEAS - PULM DIAS VEL: 35.3 CM/SEC
BH CV ECHO MEAS - PULM S/D: 1.58
BH CV ECHO MEAS - PULM SYS VEL: 55.9 CM/SEC
BH CV ECHO MEAS - QP/QS: 2.06
BH CV ECHO MEAS - RAP SYSTOLE: 3 MMHG
BH CV ECHO MEAS - RV MAX PG: 1.26 MMHG
BH CV ECHO MEAS - RV V1 MAX: 56.1 CM/SEC
BH CV ECHO MEAS - RV V1 VTI: 12.3 CM
BH CV ECHO MEAS - RVOT DIAM: 3.4 CM
BH CV ECHO MEAS - RVSP: 6 MMHG
BH CV ECHO MEAS - SV(LVOT): 55 ML
BH CV ECHO MEAS - SV(MOD-SP2): 58 ML
BH CV ECHO MEAS - SV(MOD-SP4): 57 ML
BH CV ECHO MEAS - SV(RVOT): 113.5 ML
BH CV ECHO MEAS - TAPSE (>1.6): 2 CM
BH CV ECHO MEAS - TR MAX PG: 2.8 MMHG
BH CV ECHO MEAS - TR MAX VEL: 83 CM/SEC
BH CV ECHO MEASUREMENTS AVERAGE E/E' RATIO: 9.98
BH CV XLRA - RV BASE: 2.9 CM
BH CV XLRA - RV LENGTH: 6.9 CM
BH CV XLRA - RV MID: 2.7 CM
BH CV XLRA - TDI S': 10.8 CM/SEC
LEFT ATRIUM VOLUME INDEX: 25 ML/M2
SINUS: 2.8 CM
STJ: 2.49 CM

## 2024-03-04 PROCEDURE — 93306 TTE W/DOPPLER COMPLETE: CPT

## 2024-03-04 PROCEDURE — 93306 TTE W/DOPPLER COMPLETE: CPT | Performed by: INTERNAL MEDICINE

## 2024-03-13 ENCOUNTER — OFFICE VISIT (OUTPATIENT)
Dept: FAMILY MEDICINE CLINIC | Facility: CLINIC | Age: 49
End: 2024-03-13
Payer: COMMERCIAL

## 2024-03-13 VITALS
DIASTOLIC BLOOD PRESSURE: 78 MMHG | BODY MASS INDEX: 35.31 KG/M2 | HEIGHT: 67 IN | HEART RATE: 72 BPM | SYSTOLIC BLOOD PRESSURE: 122 MMHG | WEIGHT: 225 LBS | TEMPERATURE: 98 F | OXYGEN SATURATION: 98 %

## 2024-03-13 DIAGNOSIS — F41.9 ANXIETY: ICD-10-CM

## 2024-03-13 DIAGNOSIS — F33.1 MODERATE EPISODE OF RECURRENT MAJOR DEPRESSIVE DISORDER: ICD-10-CM

## 2024-03-13 DIAGNOSIS — I10 PRIMARY HYPERTENSION: Primary | ICD-10-CM

## 2024-03-13 DIAGNOSIS — E66.01 CLASS 2 SEVERE OBESITY DUE TO EXCESS CALORIES WITH SERIOUS COMORBIDITY AND BODY MASS INDEX (BMI) OF 35.0 TO 35.9 IN ADULT: ICD-10-CM

## 2024-03-13 DIAGNOSIS — Z79.899 MEDICATION MANAGEMENT: ICD-10-CM

## 2024-03-13 DIAGNOSIS — R53.83 OTHER FATIGUE: ICD-10-CM

## 2024-03-13 PROBLEM — E66.812 CLASS 2 SEVERE OBESITY DUE TO EXCESS CALORIES WITH SERIOUS COMORBIDITY AND BODY MASS INDEX (BMI) OF 35.0 TO 35.9 IN ADULT: Status: ACTIVE | Noted: 2024-03-13

## 2024-03-13 LAB
AMPHET+METHAMPHET UR QL: NEGATIVE
AMPHETAMINE INTERNAL CONTROL: ABNORMAL
AMPHETAMINES UR QL: NEGATIVE
BARBITURATE INTERNAL CONTROL: ABNORMAL
BARBITURATES UR QL SCN: NEGATIVE
BENZODIAZ UR QL SCN: NEGATIVE
BENZODIAZEPINE INTERNAL CONTROL: ABNORMAL
BUPRENORPHINE INTERNAL CONTROL: ABNORMAL
BUPRENORPHINE SERPL-MCNC: NEGATIVE NG/ML
CANNABINOIDS SERPL QL: POSITIVE
COCAINE INTERNAL CONTROL: ABNORMAL
COCAINE UR QL: NEGATIVE
EXPIRATION DATE: ABNORMAL
Lab: ABNORMAL
MDMA (ECSTASY) INTERNAL CONTROL: ABNORMAL
MDMA UR QL SCN: NEGATIVE
METHADONE INTERNAL CONTROL: ABNORMAL
METHADONE UR QL SCN: NEGATIVE
METHAMPHETAMINE INTERNAL CONTROL: ABNORMAL
MORPHINE INTERNAL CONTROL: ABNORMAL
MORPHINE/OPIATES SCREEN, URINE: NEGATIVE
OXYCODONE INTERNAL CONTROL: ABNORMAL
OXYCODONE UR QL SCN: NEGATIVE
PCP UR QL SCN: NEGATIVE
PHENCYCLIDINE INTERNAL CONTROL: ABNORMAL
THC INTERNAL CONTROL: ABNORMAL

## 2024-03-13 RX ORDER — ESCITALOPRAM OXALATE 20 MG/1
20 TABLET ORAL DAILY
Qty: 90 TABLET | Refills: 1 | Status: SHIPPED | OUTPATIENT
Start: 2024-03-13

## 2024-03-13 RX ORDER — CYANOCOBALAMIN 1000 UG/ML
1000 INJECTION, SOLUTION INTRAMUSCULAR; SUBCUTANEOUS
Status: SHIPPED | OUTPATIENT
Start: 2024-03-13

## 2024-03-13 RX ADMIN — CYANOCOBALAMIN 1000 MCG: 1000 INJECTION, SOLUTION INTRAMUSCULAR; SUBCUTANEOUS at 07:55

## 2024-03-13 NOTE — ASSESSMENT & PLAN NOTE
Possibly could consider phentermine, did discuss the risks of this especially with lack of control of blood pressure recently.  Discussed with patient that if we do start her on this, it will be gradual and then adjustments can be made thereafter.  Discussed patient extensively about diet changes, increased exercise, patient to keep blood pressure log for 2 weeks morning and night, and then we will determine if phentermine is appropriate or not

## 2024-03-13 NOTE — PROGRESS NOTES
Sushila Stark presents to [facility] who presents to clinic for 4-week follow-up.      History of Present Illness  This is a 48-year-old female who presents to clinic for 4-week follow-up.    Hypertension/lower extremity swelling: Blood pressure is very well-controlled today at 122/78, doing really well on her medications, states that she is tolerating them well without any adverse effects.  Remains on valsartan, furosemide 80 mg twice a day, and amlodipine 10 mg.  Does still state that she will have intermittent swelling in the afternoons after she has been on her feet for long periods.  Patient denies chest pain or shortness of breath.  States she is almost wondering if it is because some of the weight that she has gained recently, and she would like to talk about weight loss options to maybe help with that.  Did have echocardiogram, came back within normal range.    Obesity: Patient states that she really wants to try to lose weight, states that she is making drastic changes to her diet, trying to increase her exercise, but she only continues to gain weight and not lose it.  She is wondering if there is any assistance that could be considered, as she really does want to lose weight as she thinks this can help with her overall health.    Anxiety/depression: Patient states that she remains on Lexapro, but she did discontinue her Wellbutrin.  States that she probably should not quit cold turkey, but she felt like it might of been contributing to her lower leg swelling, but states it has not made any difference but she does feel better off of it so she will be going back on it.  Denies any suicidal thoughts or ideation.    The following portions of the patient's history were personally reviewed and updated as appropriate: allergies, current medications, past medical history, past surgical history, past family history, and past social history.       Objective   Vital Signs:   /78  "(BP Location: Left arm, Patient Position: Sitting, Cuff Size: Adult)   Pulse 72   Temp 98 °F (36.7 °C)   Ht 170.2 cm (67.01\")   Wt 102 kg (225 lb)   SpO2 98%   BMI 35.23 kg/m²     Body mass index is 35.23 kg/m².    All labs, imaging, test results, and specialty provider notes reviewed with patient.     Physical Exam  Vitals reviewed.   Constitutional:       Appearance: Normal appearance.   Cardiovascular:      Rate and Rhythm: Normal rate and regular rhythm.      Pulses: Normal pulses.      Heart sounds: Normal heart sounds.   Pulmonary:      Effort: Pulmonary effort is normal.      Breath sounds: Normal breath sounds.   Neurological:      General: No focal deficit present.      Mental Status: She is alert and oriented to person, place, and time.              Assessment and Plan:  Diagnoses and all orders for this visit:    1. Primary hypertension (Primary)  Assessment & Plan:  Stable and doing well on current medication regimen.  Will continue on valsartan, amlodipine, and furosemide twice daily 80 mg.  Discussed with patient to continue low-salt diet and increased exercise.  Patient to monitor blood pressure in the evenings when her swelling becomes worse, will determine if excess swelling in the afternoons is related to blood pressure issues or other cause.      2. Medication management  -     POC Medline 12 Panel Urine Drug Screen    3. Other fatigue  -     cyanocobalamin injection 1,000 mcg    4. Moderate episode of recurrent major depressive disorder  -     escitalopram (Lexapro) 20 MG tablet; Take 1 tablet by mouth Daily.  Dispense: 90 tablet; Refill: 1    5. Anxiety  -     escitalopram (Lexapro) 20 MG tablet; Take 1 tablet by mouth Daily.  Dispense: 90 tablet; Refill: 1    6. Class 2 severe obesity due to excess calories with serious comorbidity and body mass index (BMI) of 35.0 to 35.9 in adult  Assessment & Plan:  Possibly could consider phentermine, did discuss the risks of this especially with lack " of control of blood pressure recently.  Discussed with patient that if we do start her on this, it will be gradual and then adjustments can be made thereafter.  Discussed patient extensively about diet changes, increased exercise, patient to keep blood pressure log for 2 weeks morning and night, and then we will determine if phentermine is appropriate or not            Follow Up:  Return in about 6 weeks (around 4/24/2024).    Patient was given instructions and counseling regarding her condition or for health maintenance advice. Please see specific information pulled into the AVS if appropriate.

## 2024-03-13 NOTE — ASSESSMENT & PLAN NOTE
Stable and doing well on current medication regimen.  Will continue on valsartan, amlodipine, and furosemide twice daily 80 mg.  Discussed with patient to continue low-salt diet and increased exercise.  Patient to monitor blood pressure in the evenings when her swelling becomes worse, will determine if excess swelling in the afternoons is related to blood pressure issues or other cause.

## 2024-04-16 DIAGNOSIS — E66.01 CLASS 2 SEVERE OBESITY DUE TO EXCESS CALORIES WITH SERIOUS COMORBIDITY AND BODY MASS INDEX (BMI) OF 35.0 TO 35.9 IN ADULT: Primary | ICD-10-CM

## 2024-04-16 RX ORDER — PHENTERMINE HYDROCHLORIDE 15 MG/1
15 CAPSULE ORAL EVERY MORNING
Qty: 30 CAPSULE | Refills: 0 | Status: SHIPPED | OUTPATIENT
Start: 2024-04-16

## 2024-04-16 NOTE — PROGRESS NOTES
Discussed at last visit after monitoring blood pressure for 2 weeks morning and night, we would start patient on very low-dose phentermine.  I will send in a 1 month prescription of this, discussed this medication in detail at last visit, risks and side effects.  UDS/consent obtained at that time, Carlos reviewed.  Patient does use CBD Gummies at night to help her sleep.

## 2024-04-22 DIAGNOSIS — F41.0 PANIC ATTACKS: ICD-10-CM

## 2024-04-22 DIAGNOSIS — F51.01 PRIMARY INSOMNIA: ICD-10-CM

## 2024-04-22 DIAGNOSIS — F41.9 ANXIETY: ICD-10-CM

## 2024-04-22 RX ORDER — ZOLPIDEM TARTRATE 10 MG/1
10 TABLET ORAL NIGHTLY PRN
Qty: 30 TABLET | Refills: 2 | Status: SHIPPED | OUTPATIENT
Start: 2024-04-22

## 2024-04-22 RX ORDER — ALPRAZOLAM 0.5 MG/1
0.5 TABLET ORAL 2 TIMES DAILY PRN
Qty: 30 TABLET | Refills: 0 | Status: SHIPPED | OUTPATIENT
Start: 2024-04-22

## 2024-05-02 DIAGNOSIS — I10 PRIMARY HYPERTENSION: ICD-10-CM

## 2024-05-03 RX ORDER — VALSARTAN 160 MG/1
160 TABLET ORAL DAILY
Qty: 30 TABLET | Refills: 2 | Status: SHIPPED | OUTPATIENT
Start: 2024-05-03

## 2024-05-22 DIAGNOSIS — E66.01 CLASS 2 SEVERE OBESITY DUE TO EXCESS CALORIES WITH SERIOUS COMORBIDITY AND BODY MASS INDEX (BMI) OF 35.0 TO 35.9 IN ADULT: ICD-10-CM

## 2024-05-22 RX ORDER — PHENTERMINE HYDROCHLORIDE 15 MG/1
15 CAPSULE ORAL EVERY MORNING
Qty: 30 CAPSULE | Refills: 0 | OUTPATIENT
Start: 2024-05-22

## 2024-05-22 NOTE — TELEPHONE ENCOUNTER
Patient needs appointment for refill, she no showed her previous appointment.  I will refill this when she comes into the office on 6/3 for appointment.  She is already been out for a week.

## 2024-05-23 DIAGNOSIS — R60.0 BILATERAL LOWER EXTREMITY EDEMA: ICD-10-CM

## 2024-05-24 RX ORDER — FUROSEMIDE 80 MG
80 TABLET ORAL 2 TIMES DAILY
Qty: 60 TABLET | Refills: 2 | Status: SHIPPED | OUTPATIENT
Start: 2024-05-24

## 2024-06-03 ENCOUNTER — OFFICE VISIT (OUTPATIENT)
Dept: FAMILY MEDICINE CLINIC | Facility: CLINIC | Age: 49
End: 2024-06-03
Payer: COMMERCIAL

## 2024-06-03 VITALS
BODY MASS INDEX: 35.38 KG/M2 | DIASTOLIC BLOOD PRESSURE: 70 MMHG | SYSTOLIC BLOOD PRESSURE: 110 MMHG | HEIGHT: 67 IN | OXYGEN SATURATION: 97 % | TEMPERATURE: 96.7 F | WEIGHT: 225.4 LBS | HEART RATE: 95 BPM

## 2024-06-03 DIAGNOSIS — R73.01 IMPAIRED FASTING GLUCOSE: ICD-10-CM

## 2024-06-03 DIAGNOSIS — E66.01 CLASS 2 SEVERE OBESITY DUE TO EXCESS CALORIES WITH SERIOUS COMORBIDITY AND BODY MASS INDEX (BMI) OF 35.0 TO 35.9 IN ADULT: Primary | ICD-10-CM

## 2024-06-03 DIAGNOSIS — F41.9 ANXIETY: ICD-10-CM

## 2024-06-03 DIAGNOSIS — F41.0 PANIC ATTACKS: ICD-10-CM

## 2024-06-03 DIAGNOSIS — I10 PRIMARY HYPERTENSION: ICD-10-CM

## 2024-06-03 DIAGNOSIS — E55.9 VITAMIN D DEFICIENCY: ICD-10-CM

## 2024-06-03 DIAGNOSIS — E78.5 HYPERLIPIDEMIA, UNSPECIFIED HYPERLIPIDEMIA TYPE: ICD-10-CM

## 2024-06-03 DIAGNOSIS — E53.8 VITAMIN B12 DEFICIENCY: ICD-10-CM

## 2024-06-03 DIAGNOSIS — R60.0 BILATERAL LOWER EXTREMITY EDEMA: ICD-10-CM

## 2024-06-03 DIAGNOSIS — F33.1 MODERATE EPISODE OF RECURRENT MAJOR DEPRESSIVE DISORDER: ICD-10-CM

## 2024-06-03 PROCEDURE — 96372 THER/PROPH/DIAG INJ SC/IM: CPT

## 2024-06-03 PROCEDURE — 99214 OFFICE O/P EST MOD 30 MIN: CPT

## 2024-06-03 RX ORDER — POTASSIUM CHLORIDE 20 MEQ/1
20 TABLET, EXTENDED RELEASE ORAL 2 TIMES DAILY
Qty: 180 TABLET | Refills: 1 | Status: SHIPPED | OUTPATIENT
Start: 2024-06-03

## 2024-06-03 RX ORDER — FUROSEMIDE 80 MG
80 TABLET ORAL 2 TIMES DAILY
Qty: 60 TABLET | Refills: 2 | Status: SHIPPED | OUTPATIENT
Start: 2024-06-03

## 2024-06-03 RX ORDER — ESCITALOPRAM OXALATE 20 MG/1
20 TABLET ORAL DAILY
Qty: 90 TABLET | Refills: 1 | Status: SHIPPED | OUTPATIENT
Start: 2024-06-03

## 2024-06-03 RX ORDER — ALPRAZOLAM 0.5 MG/1
0.5 TABLET ORAL 2 TIMES DAILY PRN
Qty: 30 TABLET | Refills: 0 | Status: SHIPPED | OUTPATIENT
Start: 2024-06-03

## 2024-06-03 RX ORDER — VALSARTAN 160 MG/1
160 TABLET ORAL DAILY
Qty: 30 TABLET | Refills: 2 | Status: SHIPPED | OUTPATIENT
Start: 2024-06-03

## 2024-06-03 RX ORDER — AMLODIPINE BESYLATE 10 MG/1
10 TABLET ORAL NIGHTLY
Qty: 90 TABLET | Refills: 1 | Status: SHIPPED | OUTPATIENT
Start: 2024-06-03

## 2024-06-03 RX ORDER — PHENTERMINE HYDROCHLORIDE 30 MG/1
30 CAPSULE ORAL EVERY MORNING
Qty: 30 CAPSULE | Refills: 0 | Status: SHIPPED | OUTPATIENT
Start: 2024-06-03

## 2024-06-03 RX ADMIN — CYANOCOBALAMIN 1000 MCG: 1000 INJECTION, SOLUTION INTRAMUSCULAR; SUBCUTANEOUS at 08:36

## 2024-06-03 NOTE — ASSESSMENT & PLAN NOTE
Patient tolerated initial dosage of phentermine well, but then did not make follow-up so has not been on it over a month.  Will go ahead and increase to 30 mg, discussed risk/side effects and use of medication, UDS/consent are up-to-date and Carlos is reviewed.  Continue to monitor blood pressure and heart rate while on this medication.  Weight loss would be beneficial.

## 2024-06-03 NOTE — ASSESSMENT & PLAN NOTE
Stable and doing well on current medication regimen, very well-controlled.  Will continue on amlodipine, furosemide, and valsartan all at current dosages.  Continue on potassium supplements as well.  Low-salt diet and increased exercise encouraged.  Weight loss will be beneficial.

## 2024-06-03 NOTE — PROGRESS NOTES
Sushila Stark presents to [facility] who presents to clinic for 6-week follow-up.      History of Present Illness  This is a 48-year-old female who presents to clinic for 6-week follow-up.    Obesity: Patient states that she got her dates mixed up and thus missed her last appointment.  Did take about a months worth of phentermine, tolerated well without any adverse effects, states that she did at times have to remind herself to eat but other than that did really well on the medication.  States that even really helped with her lower leg swelling, and states that she did feel like she lost quite a bit of weight because the swelling had improved.  Does not appear to have lost weight via our scales, but has been off of it for over a month.  Would like to continue on it, possibly and add an increased dose.  States that she is trying really hard with other diet changes and increased exercise.    Hypertension: Patient doing really well on blood pressure medications.  Blood pressure today is 110/70, remains on amlodipine, furosemide, and valsartan.  Denies chest pain or shortness of breath, lower leg swelling has improved, still does have some after being on her feet for long hours in the afternoons.  Denies any issues.    Patient states she would like to have blood work completed, states that it is something that keeps in the back of her head as her parent did have kidney failure and was on dialysis at 1 point so wants to monitor her kidneys very closely.    Up-to-date on preventative screenings.  Will do annual physical at next visit.  Patient also needs refills of medications, will send in.    The following portions of the patient's history were personally reviewed and updated as appropriate: allergies, current medications, past medical history, past surgical history, past family history, and past social history.       Objective   Vital Signs:   /70 (BP Location: Left arm, Patient  "Position: Sitting, Cuff Size: Adult)   Pulse 95   Temp 96.7 °F (35.9 °C)   Ht 170.2 cm (67.01\")   Wt 102 kg (225 lb 6.4 oz)   SpO2 97%   BMI 35.29 kg/m²     Body mass index is 35.29 kg/m².    All labs, imaging, test results, and specialty provider notes reviewed with patient.     Physical Exam  Vitals reviewed.   Constitutional:       Appearance: Normal appearance.   Cardiovascular:      Rate and Rhythm: Normal rate and regular rhythm.      Pulses: Normal pulses.      Heart sounds: Normal heart sounds.   Pulmonary:      Effort: Pulmonary effort is normal.      Breath sounds: Normal breath sounds.   Neurological:      General: No focal deficit present.      Mental Status: She is alert and oriented to person, place, and time.                               Assessment and Plan:  Diagnoses and all orders for this visit:    1. Class 2 severe obesity due to excess calories with serious comorbidity and body mass index (BMI) of 35.0 to 35.9 in adult (Primary)  Assessment & Plan:  Patient tolerated initial dosage of phentermine well, but then did not make follow-up so has not been on it over a month.  Will go ahead and increase to 30 mg, discussed risk/side effects and use of medication, UDS/consent are up-to-date and Carlos is reviewed.  Continue to monitor blood pressure and heart rate while on this medication.  Weight loss would be beneficial.    Orders:  -     phentermine 30 MG capsule; Take 1 capsule by mouth Every Morning.  Dispense: 30 capsule; Refill: 0    2. Primary hypertension  Assessment & Plan:  Stable and doing well on current medication regimen, very well-controlled.  Will continue on amlodipine, furosemide, and valsartan all at current dosages.  Continue on potassium supplements as well.  Low-salt diet and increased exercise encouraged.  Weight loss will be beneficial.    Orders:  -     amLODIPine (NORVASC) 10 MG tablet; Take 1 tablet by mouth Every Night.  Dispense: 90 tablet; Refill: 1  -     valsartan " (DIOVAN) 160 MG tablet; Take 1 tablet by mouth Daily.  Dispense: 30 tablet; Refill: 2  -     CBC Auto Differential; Future  -     Comprehensive Metabolic Panel; Future  -     TSH Rfx On Abnormal To Free T4; Future  -     Urinalysis With Culture If Indicated -; Future    3. Moderate episode of recurrent major depressive disorder  -     escitalopram (Lexapro) 20 MG tablet; Take 1 tablet by mouth Daily.  Dispense: 90 tablet; Refill: 1    4. Anxiety  -     escitalopram (Lexapro) 20 MG tablet; Take 1 tablet by mouth Daily.  Dispense: 90 tablet; Refill: 1  -     ALPRAZolam (XANAX) 0.5 MG tablet; Take 1 tablet by mouth 2 (Two) Times a Day As Needed for Anxiety. for anxiety  Dispense: 30 tablet; Refill: 0    5. Bilateral lower extremity edema  -     furosemide (LASIX) 80 MG tablet; Take 1 tablet by mouth 2 (Two) Times a Day.  Dispense: 60 tablet; Refill: 2    6. Panic attacks  -     ALPRAZolam (XANAX) 0.5 MG tablet; Take 1 tablet by mouth 2 (Two) Times a Day As Needed for Anxiety. for anxiety  Dispense: 30 tablet; Refill: 0    7. Hyperlipidemia, unspecified hyperlipidemia type  -     Lipid Panel; Future    8. Impaired fasting glucose    9. Vitamin D deficiency  -     Vitamin D,25-Hydroxy; Future    10. Vitamin B12 deficiency  -     Vitamin B12 & Folate; Future    Other orders  -     potassium chloride (KLOR-CON M20) 20 MEQ CR tablet; Take 1 tablet by mouth 2 (Two) Times a Day.  Dispense: 180 tablet; Refill: 1          Follow Up:  Return in about 4 weeks (around 7/1/2024) for Annual physical.    Patient was given instructions and counseling regarding her condition or for health maintenance advice. Please see specific information pulled into the AVS if appropriate.

## 2024-07-12 ENCOUNTER — TELEPHONE (OUTPATIENT)
Dept: FAMILY MEDICINE CLINIC | Facility: CLINIC | Age: 49
End: 2024-07-12
Payer: COMMERCIAL

## 2024-07-15 ENCOUNTER — OFFICE VISIT (OUTPATIENT)
Dept: FAMILY MEDICINE CLINIC | Facility: CLINIC | Age: 49
End: 2024-07-15
Payer: COMMERCIAL

## 2024-07-15 VITALS
HEIGHT: 67 IN | DIASTOLIC BLOOD PRESSURE: 88 MMHG | WEIGHT: 217.4 LBS | SYSTOLIC BLOOD PRESSURE: 138 MMHG | HEART RATE: 87 BPM | TEMPERATURE: 97.6 F | OXYGEN SATURATION: 98 % | BODY MASS INDEX: 34.12 KG/M2

## 2024-07-15 DIAGNOSIS — R20.2 NUMBNESS AND TINGLING IN BOTH HANDS: ICD-10-CM

## 2024-07-15 DIAGNOSIS — R60.0 BILATERAL LOWER EXTREMITY EDEMA: ICD-10-CM

## 2024-07-15 DIAGNOSIS — R20.0 NUMBNESS AND TINGLING IN BOTH HANDS: ICD-10-CM

## 2024-07-15 DIAGNOSIS — E66.09 CLASS 1 OBESITY DUE TO EXCESS CALORIES WITH SERIOUS COMORBIDITY AND BODY MASS INDEX (BMI) OF 34.0 TO 34.9 IN ADULT: Primary | ICD-10-CM

## 2024-07-15 DIAGNOSIS — F51.01 PRIMARY INSOMNIA: ICD-10-CM

## 2024-07-15 DIAGNOSIS — I10 PRIMARY HYPERTENSION: ICD-10-CM

## 2024-07-15 DIAGNOSIS — F33.1 MODERATE EPISODE OF RECURRENT MAJOR DEPRESSIVE DISORDER: ICD-10-CM

## 2024-07-15 DIAGNOSIS — F41.9 ANXIETY: ICD-10-CM

## 2024-07-15 PROCEDURE — 99214 OFFICE O/P EST MOD 30 MIN: CPT

## 2024-07-15 RX ORDER — ZOLPIDEM TARTRATE 10 MG/1
10 TABLET ORAL NIGHTLY PRN
Qty: 30 TABLET | Refills: 2 | Status: SHIPPED | OUTPATIENT
Start: 2024-07-15

## 2024-07-15 RX ORDER — POTASSIUM CHLORIDE 20 MEQ/1
20 TABLET, EXTENDED RELEASE ORAL 2 TIMES DAILY
Qty: 180 TABLET | Refills: 1 | Status: SHIPPED | OUTPATIENT
Start: 2024-07-15

## 2024-07-15 RX ORDER — TIZANIDINE HYDROCHLORIDE 4 MG/1
4 CAPSULE, GELATIN COATED ORAL 3 TIMES DAILY
Qty: 90 CAPSULE | Refills: 0 | Status: SHIPPED | OUTPATIENT
Start: 2024-07-15

## 2024-07-15 RX ORDER — FUROSEMIDE 80 MG
80 TABLET ORAL 2 TIMES DAILY
Qty: 180 TABLET | Refills: 3 | Status: SHIPPED | OUTPATIENT
Start: 2024-07-15

## 2024-07-15 RX ORDER — VALSARTAN 160 MG/1
160 TABLET ORAL DAILY
Qty: 90 TABLET | Refills: 3 | Status: SHIPPED | OUTPATIENT
Start: 2024-07-15

## 2024-07-15 RX ORDER — AMLODIPINE BESYLATE 10 MG/1
10 TABLET ORAL NIGHTLY
Qty: 90 TABLET | Refills: 3 | Status: SHIPPED | OUTPATIENT
Start: 2024-07-15

## 2024-07-15 RX ORDER — ESCITALOPRAM OXALATE 20 MG/1
20 TABLET ORAL DAILY
Qty: 90 TABLET | Refills: 3 | Status: SHIPPED | OUTPATIENT
Start: 2024-07-15

## 2024-07-15 RX ORDER — PHENTERMINE HYDROCHLORIDE 37.5 MG/1
37.5 CAPSULE ORAL EVERY MORNING
Qty: 30 CAPSULE | Refills: 0 | Status: SHIPPED | OUTPATIENT
Start: 2024-07-15

## 2024-07-15 NOTE — ASSESSMENT & PLAN NOTE
Has done really well on phentermine, no adverse effects, we will now increase to the next dosage and continue.  Discussed with patient continued risk and side effects and use.  Patient to follow-up in 1 month.  UDS/consider up-to-date Carlos is reviewed.

## 2024-07-15 NOTE — ASSESSMENT & PLAN NOTE
Patient doing really well on current medication regimen, took medicine right before coming into the office today but states that her blood pressure has been doing great at home.  She has not had any significant lower leg swelling, and she is done really well.  Remains on valsartan, amlodipine, and furosemide.  Remains on potassium supplements as well.  Low-salt diet and increased exercise encouraged.

## 2024-07-15 NOTE — PROGRESS NOTES
Sushila Stark presents to [facility] who presents to the clinic for 1 month follow-up.       History of Present Illness  This is a 49-year female who presents to clinic for 1 month follow-up.    Obesity: Patient has done really well on phentermine, has lost an additional 8 pounds since being on this medication, states that she has been out of it for the past 4 to 5 days due to not having enough until this appointment, but states she is done well without any major adverse effects.  Denies any chest pain or shortness of breath.  Blood pressure is relatively well-controlled at home, states it is slightly high today because she just took her medication prior to coming into the visit.  States that she is doing really well and would like to continue on is wondering if we can go up to the maintenance dosing.    Patient also states that she has bilateral numbness and tingling.  Patient states that she was diagnosed with carpal tunnel syndrome of her right wrist back about 10 years ago, was told that she needed surgery at that time but did not go through with it.  Patient states that she is been doing a lot of heavy lifting at work, and states that her wrists are causing her a lot of pain along with her back left shoulder blade that she had to use lidocaine patches for.  Patient states that she is got numbness and tingling in both her wrist and fingers, she has been trying to keep it elevated and do some other conservative measures at home but nothing is giving her much relief.  She is wondering at this point what else she can do to try to help because it is very miserable.    Will do annual physical at next visit.  Patient to get blood work done prior to that visit.  Patient also needs refills of medications.    The following portions of the patient's history were personally reviewed and updated as appropriate: allergies, current medications, past medical history, past surgical history, past  "family history, and past social history.       Objective   Vital Signs:   /88 (BP Location: Left arm, Patient Position: Sitting, Cuff Size: Adult)   Pulse 87   Temp 97.6 °F (36.4 °C)   Ht 170.2 cm (67.01\")   Wt 98.6 kg (217 lb 6.4 oz)   SpO2 98%   BMI 34.04 kg/m²     Body mass index is 34.04 kg/m².    All labs, imaging, test results, and specialty provider notes reviewed with patient.     Physical Exam  Vitals reviewed.   Constitutional:       Appearance: Normal appearance.   Cardiovascular:      Rate and Rhythm: Normal rate and regular rhythm.      Pulses: Normal pulses.      Heart sounds: Normal heart sounds.   Pulmonary:      Effort: Pulmonary effort is normal.      Breath sounds: Normal breath sounds.   Neurological:      General: No focal deficit present.      Mental Status: She is alert and oriented to person, place, and time.            Assessment and Plan:  Diagnoses and all orders for this visit:    1. Class 1 obesity due to excess calories with serious comorbidity and body mass index (BMI) of 34.0 to 34.9 in adult (Primary)  Assessment & Plan:  Has done really well on phentermine, no adverse effects, we will now increase to the next dosage and continue.  Discussed with patient continued risk and side effects and use.  Patient to follow-up in 1 month.  UDS/consider up-to-date Carlos is reviewed.    Orders:  -     phentermine 37.5 MG capsule; Take 1 capsule by mouth Every Morning.  Dispense: 30 capsule; Refill: 0    2. Primary hypertension  Assessment & Plan:  Patient doing really well on current medication regimen, took medicine right before coming into the office today but states that her blood pressure has been doing great at home.  She has not had any significant lower leg swelling, and she is done really well.  Remains on valsartan, amlodipine, and furosemide.  Remains on potassium supplements as well.  Low-salt diet and increased exercise encouraged.    Orders:  -     valsartan (DIOVAN) 160 " MG tablet; Take 1 tablet by mouth Daily.  Dispense: 90 tablet; Refill: 3  -     amLODIPine (NORVASC) 10 MG tablet; Take 1 tablet by mouth Every Night.  Dispense: 90 tablet; Refill: 3    3. Bilateral lower extremity edema  -     furosemide (LASIX) 80 MG tablet; Take 1 tablet by mouth 2 (Two) Times a Day.  Dispense: 180 tablet; Refill: 3    4. Moderate episode of recurrent major depressive disorder  -     escitalopram (Lexapro) 20 MG tablet; Take 1 tablet by mouth Daily.  Dispense: 90 tablet; Refill: 3    5. Anxiety  -     escitalopram (Lexapro) 20 MG tablet; Take 1 tablet by mouth Daily.  Dispense: 90 tablet; Refill: 3    6. Primary insomnia  -     zolpidem (AMBIEN) 10 MG tablet; Take 1 tablet by mouth At Night As Needed for Sleep.  Dispense: 30 tablet; Refill: 2    7. Numbness and tingling in both hands  Comments:  Obtain EMG, prescribe muscle relaxer for assistance, wear  braces at night  Orders:  -     TiZANidine (ZANAFLEX) 4 MG capsule; Take 1 capsule by mouth 3 (Three) Times a Day.  Dispense: 90 capsule; Refill: 0  -     EMG & Nerve Conduction Test; Future    Other orders  -     potassium chloride (KLOR-CON M20) 20 MEQ CR tablet; Take 1 tablet by mouth 2 (Two) Times a Day.  Dispense: 180 tablet; Refill: 1          Follow Up:  Return in about 4 weeks (around 8/12/2024) for Annual physical.    Patient was given instructions and counseling regarding her condition or for health maintenance advice. Please see specific information pulled into the AVS if appropriate.

## 2024-10-04 DIAGNOSIS — F51.01 PRIMARY INSOMNIA: ICD-10-CM

## 2024-10-04 DIAGNOSIS — F33.1 MODERATE EPISODE OF RECURRENT MAJOR DEPRESSIVE DISORDER: ICD-10-CM

## 2024-10-04 DIAGNOSIS — F41.9 ANXIETY: ICD-10-CM

## 2024-10-04 DIAGNOSIS — F41.0 PANIC ATTACKS: ICD-10-CM

## 2024-10-04 DIAGNOSIS — E55.9 VITAMIN D DEFICIENCY: Chronic | ICD-10-CM

## 2024-10-04 NOTE — TELEPHONE ENCOUNTER
UPCOMING APPTS  No upcoming appointments  LAST OFFICE VISIT - THIS DEPT  7/15/2024 Mendy Resendez APRN    Uds3/13/24

## 2024-10-08 RX ORDER — ERGOCALCIFEROL 1.25 MG/1
50000 CAPSULE, LIQUID FILLED ORAL WEEKLY
Qty: 13 CAPSULE | Refills: 1 | Status: SHIPPED | OUTPATIENT
Start: 2024-10-08

## 2024-10-08 RX ORDER — ALPRAZOLAM 0.5 MG
0.5 TABLET ORAL 2 TIMES DAILY PRN
Qty: 30 TABLET | Refills: 0 | Status: SHIPPED | OUTPATIENT
Start: 2024-10-08

## 2024-10-08 RX ORDER — ESCITALOPRAM OXALATE 20 MG/1
20 TABLET ORAL DAILY
Qty: 90 TABLET | Refills: 1 | Status: SHIPPED | OUTPATIENT
Start: 2024-10-08

## 2024-10-08 RX ORDER — ZOLPIDEM TARTRATE 10 MG/1
TABLET ORAL
Qty: 30 TABLET | Refills: 2 | Status: SHIPPED | OUTPATIENT
Start: 2024-10-08

## 2024-12-31 ENCOUNTER — TELEMEDICINE (OUTPATIENT)
Dept: FAMILY MEDICINE CLINIC | Facility: TELEHEALTH | Age: 49
End: 2024-12-31
Payer: COMMERCIAL

## 2024-12-31 DIAGNOSIS — J06.9 UPPER RESPIRATORY TRACT INFECTION, UNSPECIFIED TYPE: Primary | ICD-10-CM

## 2024-12-31 RX ORDER — BROMPHENIRAMINE MALEATE, PSEUDOEPHEDRINE HYDROCHLORIDE, AND DEXTROMETHORPHAN HYDROBROMIDE 2; 30; 10 MG/5ML; MG/5ML; MG/5ML
5 SYRUP ORAL 3 TIMES DAILY PRN
Qty: 120 ML | Refills: 0 | Status: SHIPPED | OUTPATIENT
Start: 2024-12-31

## 2024-12-31 NOTE — PROGRESS NOTES
CHIEF COMPLAINT  Chief Complaint   Patient presents with    Sinusitis    Cough    Sore Throat         HPI  Sushila Stark is a 49 y.o. female  presents with complaint of sinus drainage with cough and sore throat. She has laryngitis. She has been sick for 3 days. Her  has the same thing and has been sick for 6 days. They think they may have the flu.     Review of Systems   Constitutional:  Positive for fatigue. Negative for chills, diaphoresis and fever (resolved).   HENT:  Positive for congestion, postnasal drip, rhinorrhea, sinus pressure and sore throat.    Respiratory:  Positive for cough. Negative for chest tightness, shortness of breath and wheezing.    Cardiovascular:  Negative for chest pain.   Gastrointestinal:  Negative for diarrhea, nausea and vomiting.   Musculoskeletal:  Negative for myalgias.   Neurological:  Positive for headaches.       Past Medical History:   Diagnosis Date    Allergic rhinitis due to allergen 02/14/2017    Anal high risk HPV DNA test positive     Anxiety     Anxiety disorder 02/14/2017    Depression     Essential hypertension 11/01/2018    Hx of abnormal cervical Pap smear     Hyperlipidemia     Hypertension     Impaired fasting glucose 01/13/2021    Insomnia, unspecified 04/13/2020    Kidney stone     Migraine headache     Nicotine dependence 09/24/2018    Psychiatric illness     Streptococcal sore throat     Ureterolithiasis 08/18/2016    Vitamin D deficiency 08/20/2020       Family History   Problem Relation Age of Onset    Hypertension Mother     Other Other         BLADDER CANCER MALIGNANT    Diabetes type II Other         MELLITUS       Social History     Socioeconomic History    Marital status:    Tobacco Use    Smoking status: Every Day     Current packs/day: 1.00     Average packs/day: 1 pack/day for 5.0 years (5.0 ttl pk-yrs)     Types: Cigarettes    Smokeless tobacco: Never    Tobacco comments:     STARTED AT AGE 22, STILL SMOKING, STOPPED SMOKING AT AGE 36  AND STARTED AGAIN AS OF/ /   Vaping Use    Vaping status: Never Used   Substance and Sexual Activity    Alcohol use: Yes     Comment: Rarely.     Drug use: Not Currently    Sexual activity: Not Currently         There were no vitals taken for this visit.    PHYSICAL EXAM  Physical Exam   Constitutional: She is oriented to person, place, and time. She appears well-developed and well-nourished. She does not have a sickly appearance. She does not appear ill. No distress.   HENT:   Head: Normocephalic and atraumatic.   Nose: Rhinorrhea and congestion present. Right sinus exhibits no maxillary sinus tenderness and no frontal sinus tenderness. Left sinus exhibits no maxillary sinus tenderness and no frontal sinus tenderness.   Eyes: EOM are normal.   Neck: Neck normal appearance.  Pulmonary/Chest: Effort normal.  No respiratory distress.  Neurological: She is alert and oriented to person, place, and time.   Skin: Skin is dry.   Psychiatric: She has a normal mood and affect.           Diagnoses and all orders for this visit:    1. Upper respiratory tract infection, unspecified type (Primary)    Other orders  -     brompheniramine-pseudoephedrine-DM 30-2-10 MG/5ML syrup; Take 5 mL by mouth 3 (Three) Times a Day As Needed for Congestion or Cough.  Dispense: 120 mL; Refill: 0    Stop the potassium supplement while taking the bromfed.     Mode of visit: Video   Myself and Sushila Stark participated in this visit. The patient is located in [address] [place] [address]. I am located in [place], [place]. [employer] and [employer] were utilized.   You have chosen to receive care through a telehealth visit.    You have chosen to receive care through a telehealth visit.   Does the patient consent to use a video/audio connection for your medical care today? Yes       Note Disclaimer: At [facility], we believe that sharing information builds trust and better   relationships. You are receiving this note because you recently visited [facility]. It is possible you   will see health information before a provider has talked with you about it. This kind of information can   be easy to misunderstand. To help you fully understand what it means for your health, we urge you to   discuss this note with your provider.    Dee Childs, SHIRAZ  12/31/2024  16:38 EST

## 2024-12-31 NOTE — PATIENT INSTRUCTIONS
Stop the potassium supplement while taking the bromfed.   Drink plenty of water  Over the counter pain relievers okay   If symptoms do not improve in 3-5 days follow up with your primary care provider or urgent care  If symptoms worsen follow up with urgent care or the emergency room      Upper Respiratory Infection, Adult  An upper respiratory infection (URI) is a common viral infection of the nose, throat, and upper air passages that lead to the lungs. The most common type of URI is the common cold. URIs usually get better on their own, without medical treatment.  What are the causes?  A URI is caused by a virus. You may catch a virus by:  Breathing in droplets from an infected person's cough or sneeze.  Touching something that has been exposed to the virus (is contaminated) and then touching your mouth, nose, or eyes.  What increases the risk?  You are more likely to get a URI if:  You are very young or very old.  You have close contact with others, such as at work, school, or a health care facility.  You smoke.  You have long-term (chronic) heart or lung disease.  You have a weakened disease-fighting system (immune system).  You have nasal allergies or asthma.  You are experiencing a lot of stress.  You have poor nutrition.  What are the signs or symptoms?  A URI usually involves some of the following symptoms:  Runny or stuffy (congested) nose.  Cough.  Sneezing.  Sore throat.  Headache.  Fatigue.  Fever.  Loss of appetite.  Pain in your forehead, behind your eyes, and over your cheekbones (sinus pain).  Muscle aches.  Redness or irritation of the eyes.  Pressure in the ears or face.  How is this diagnosed?  This condition may be diagnosed based on your medical history and symptoms, and a physical exam. Your health care provider may use a swab to take a mucus sample from your nose (nasal swab). This sample can be tested to determine what virus is causing the illness.  How is this treated?  URIs usually get  better on their own within 7-10 days. Medicines cannot cure URIs, but your health care provider may recommend certain medicines to help relieve symptoms, such as:  Over-the-counter cold medicines.  Cough suppressants. Coughing is a type of defense against infection that helps to clear the respiratory system, so take these medicines only as recommended by your health care provider.  Fever-reducing medicines.  Follow these instructions at home:  Activity  Rest as needed.  If you have a fever, stay home from work or school until your fever is gone or until your health care provider says your URI cannot spread to other people (is no longer contagious). Your health care provider may have you wear a face mask to prevent your infection from spreading.  Relieving symptoms  Gargle with a mixture of salt and water 3-4 times a day or as needed. To make salt water, completely dissolve ½-1 tsp (3-6 g) of salt in 1 cup (237 mL) of warm water.  Use a cool-mist humidifier to add moisture to the air. This can help you breathe more easily.  Eating and drinking    Drink enough fluid to keep your urine pale yellow.  Eat soups and other clear broths.  General instructions    Take over-the-counter and prescription medicines only as told by your health care provider. These include cold medicines, fever reducers, and cough suppressants.  Do not use any products that contain nicotine or tobacco. These products include cigarettes, chewing tobacco, and vaping devices, such as e-cigarettes. If you need help quitting, ask your health care provider.  Stay away from secondhand smoke.  Stay up to date on all immunizations, including the yearly (annual) flu vaccine.  Keep all follow-up visits. This is important.  How to prevent the spread of infection to others  URIs can be contagious. To prevent the infection from spreading:  Wash your hands with soap and water for at least 20 seconds. If soap and water are not available, use hand  .  Avoid touching your mouth, face, eyes, or nose.  Cough or sneeze into a tissue or your sleeve or elbow instead of into your hand or into the air.    Contact a health care provider if:  You are getting worse instead of better.  You have a fever or chills.  Your mucus is brown or red.  You have yellow or brown discharge coming from your nose.  You have pain in your face, especially when you bend forward.  You have swollen neck glands.  You have pain while swallowing.  You have white areas in the back of your throat.  Get help right away if:  You have shortness of breath that gets worse.  You have severe or persistent:  Headache.  Ear pain.  Sinus pain.  Chest pain.  You have chronic lung disease along with any of the following:  Making high-pitched whistling sounds when you breathe, most often when you breathe out (wheezing).  Prolonged cough (more than 14 days).  Coughing up blood.  A change in your usual mucus.  You have a stiff neck.  You have changes in your:  Vision.  Hearing.  Thinking.  Mood.  These symptoms may be an emergency. Get help right away. Call 911.  Do not wait to see if the symptoms will go away.  Do not drive yourself to the hospital.  Summary  An upper respiratory infection (URI) is a common infection of the nose, throat, and upper air passages that lead to the lungs.  A URI is caused by a virus.  URIs usually get better on their own within 7-10 days.  Medicines cannot cure URIs, but your health care provider may recommend certain medicines to help relieve symptoms.  This information is not intended to replace advice given to you by your health care provider. Make sure you discuss any questions you have with your health care provider.  Document Revised: 07/20/2022 Document Reviewed: 07/20/2022  [employer] Patient Education © 2024 [employer]

## 2024-12-31 NOTE — LETTER
December 31, 2024     Patient: Sushila Stark   YOB: 1975   Date of Visit: 12/31/2024       To Whom It May Concern:    It is my medical opinion that Sushila Stark may return to work on Friday 1/3/2025. She will need to be excused form Monday 12/30-Thursday 1/2/2025      Sincerely,    SHIRAZ Cannon     URGENT CARE VIDEO VISIT PROVIDER    CC: No Recipients

## 2025-01-02 ENCOUNTER — E-VISIT (OUTPATIENT)
Dept: FAMILY MEDICINE CLINIC | Facility: TELEHEALTH | Age: 50
End: 2025-01-02

## 2025-01-02 ENCOUNTER — E-VISIT (OUTPATIENT)
Dept: ADMINISTRATIVE | Facility: OTHER | Age: 50
End: 2025-01-02
Payer: COMMERCIAL

## 2025-01-02 ENCOUNTER — TELEPHONE (OUTPATIENT)
Dept: FAMILY MEDICINE CLINIC | Facility: CLINIC | Age: 50
End: 2025-01-02
Payer: COMMERCIAL

## 2025-01-02 NOTE — TELEPHONE ENCOUNTER
Caller: Sushila Stark    Relationship: Self    Best call back number: [phone]     What medication are you requesting: ANTIBIOTIC, COUGH MEDICATION    What are your current symptoms: COUGH, CONGESTION    How long have you been experiencing symptoms: A WEEK    Have you had these symptoms before:    [x] Yes  [] No    Have you been treated for these symptoms before:   [x] Yes  [] No    If a prescription is needed, what is your preferred pharmacy and phone number: [facility] - [place], [place] - [address] - [phone]  - [phone] FX

## 2025-01-02 NOTE — E-VISIT TREATED
Date: 2025 15:06:59  Clinician: Christi Perla  Clinician NPI: [id number]  Patient: Sushila Stark  Patient : 1975  Patient Address: [address] [place] [address]  Patient Phone: [phone]  Visit Protocol: URI  Patient Summary:  Sushila is a 49 year old ( : 1975 ) female who initiated a visit for cold, sinus infection, or influenza.     Sushila states the symptoms started suddenly 6 days ago. After the symptoms started, they improved and then got worse   again.   Symptom start date:    The symptoms consist of malaise, ear pain, rhinitis, a cough, facial pain or pressure, chills, enlarged lymph nodes, myalgia, nasal congestion, and a sore throat. Sushila is experiencing difficulty breathing due to   nasal congestion but is not short of breath.   Symptom details     Nasal secretions: The color of the mucus is clear.    Cough: Sushila coughs a few times an hour and the cough is not more bothersome at night. Phlegm does not come into the throat when coughing. Sushila believes the cough is caused by post-nasal drip.     Sore throat: Sushila reports having mild throat pain (1-3 on a 10 point pain scale), does not have exudate on the tonsils, and can swallow liquids without any difficulty. The lymph nodes in the neck are enlarged. The lymph node swelling is not worse on   one side and the swelling has caused changes in speech or hoarseness in the voice. A rash has not appeared on the skin since the sore throat started.     Facial pain or pressure: The facial pain or pressure does not feel worse when bending or leaning forward.      Sushila denies having vomiting, wheezing, diarrhea, teeth pain, nausea, fever, headache, and anosmia and ageusia. Sushila also denies taking antibiotic medication in the past month, having recent facial or sinus surgery in the past 60 days, and having a   sinus infection within the past year.   Precipitating events  Within the past week, Sushila has not been exposed to someone with strep  throat. Sushila has recently been exposed to someone with influenza. Sushila has not been in close contact with any high risk   individuals.    Sushila has not received the influenza vaccination.   Pertinent COVID-19 (Coronavirus) information  Since the symptoms started, Sushila has not tested for COVID-19.   Sushila has not had COVID-19 in the last 3 months.   Sushila has not received   a COVID-19 vaccine in the past year.   Pertinent medical history    Sushila reports having the following conditions:   Hypertension    Sushila typically gets a yeast infection when an antibiotic is taken. Sushila has successfully used Diflucan to treat   previous yeast infections. 1 dose of fluconazole (Diflucan) has typically been sufficient for symptoms to resolve in the past.   A provider has not told Sushila to avoid NSAIDs.   Sushila does not have diabetes. Sushila denies having immunosuppressive   conditions (e.g., chemotherapy, HIV, organ transplant, long-term use of steroids or other immunosuppressive medications, splenectomy). Sushila denies having heart disease, severe COPD, and congestive heart failure. Sushila does not have asthma.   Sushila does   not smoke or use smokeless tobacco. Sushila vapes or uses other e-cigarette products.   Sushila denies pregnancy and denies breastfeeding.   Weight: 230 lbs (104.33 kg)    MEDICATIONS: furosemide oral, zolpidem oral, sulfamethoxazole-trimethoprim oral, brompheniramine-pseudoephedrine-DM oral, methylprednisolone acetate injection, phentermine oral, tizanidine oral, potassium chloride oral, aspirin oral, valsartan oral,   ergocalciferol (vitamin D2) oral, amlodipine oral, alprazolam oral, ALLERGIES: amoxicillin, codeine  Clinician Response:  Dear Sushila,  Based on the information provided, you have viral sinusitis, also known as a sinus infection. Most cases of sinus infections are caused by viruses and symptoms start to improve on their own in 7-10 days. Both viral and   bacterial sinus infections usually  resolve on its own. A bacterial infection may have developed if any of the following apply to you.      Symptoms of sinus infection lasting 10 days or more without showing any improvement    If you feel better after a viral upper respiratory infection such as, a cold but then suddenly feel worse with symptoms such as fever, headache, or increase in nasal discharge     Medication information  Because you have a viral infection, antibiotics will not help you get better. Treating a viral infection with antibiotics could actually make you feel worse.  For more information on why I am not prescribing antibiotics, please   watch this video: Antibiotics Aren't Always the Answer.  I am prescribing:       Fluticasone 50 mcg/actuation nasal spray. Inhale 2 sprays in each nostril 1 time per day; after 1 week, may adjust to 1 - 2 sprays in each nostril 1 time per day. This medication takes several days to start working, so keep taking it even if it doesn't   help right away. There are no refills with this prescription.      Benzonatate 200 mg oral capsule. Take 1 capsule 3 times a day as needed for cough. There are no refills with this prescription.     Tips for using a nasal spray:     When the medication is being sprayed in your nose, point the tip of the nasal spray towards your ear.    Do not blow your nose after using the spray.    Do not lean your head back after using the spray as it will go down your throat.    Wipe the tip of the nose piece after use with a dry, clean tissue.     Self care  Steps you can take to be as comfortable as possible:     Rest.    Drink plenty of fluids.    Take a warm shower to loosen congestion.    Use a cool-mist humidifier.    Use throat lozenges.    Suck on frozen items such as popsicles.    Drink hot tea with lemon and honey.    Gargle with warm salt water (1/4 teaspoon of salt per 8 ounce glass of water).    Take a spoonful of honey to reduce your cough.     Quitting vaping or other  e-cigarette products is one of the best things you can do to improve your health. Vaping and the use of e-cigarettes have been found to increase the risk of upper respiratory infections and can also lead to more severe   symptoms. Most e-cigarettes contain nicotine and other harmful substances. You can get free help from smoking cessation experts to quit e-cigarettes or vaping by calling toll-free at 1-432-QUIT-NOW (1-506.450.3956).   When to seek care  Please be seen in   a clinic or urgent care if any of the following occur:   New symptoms develop, or symptoms become worse   Call 911 or go to the emergency room if any of the following occur:     Difficulty breathing    If you feel that your throat is closing off    Suddenly develop a rash    Unable to swallow fluids or are drooling     For the latest updates on COVID-19 (Coronavirus), please visit the Centers for Disease Control and Prevention (CDC). Also, your state and local health department websites may provide additional guidance regarding testing and isolation recommendations   for your location.   Diagnosis: Viral sinusitis  Diagnosis ICD: J01.90    Follow up instructions:  ATTENTION: If you have been prescribed medications, your prescriptions will not be sent until you choose your pharmacy. To do so open the link within your notification, or go to [email or web address] and click eVisit in the menu to open your   treatment plan. From there, you can select your pharmacy at the bottom of your after visit summary. You can also go to [email or web address]   Prescriptions  Prescription: fluticasone 50 mcg/actuation nasal spray,suspension, inhale 2 sprays in each nostril 1 time per day; after 1 week, may adjust to 1 - 2 sprays in each nostril 1 time per day.  Prescription: prednisone 10 mg oral tablets,dose pack, take tablets,dose pack  Prescription: benzonatate 200 mg oral capsule, take 1 capsule 3 times a day as needed for cough

## 2025-01-02 NOTE — E-VISIT ESCALATED
Status: Referred Out  Date: 2025 13:59:16  Acuity Level: Not applicable  Referral message:  A visit is not appropriate for you because you indicated you are experiencing an emergency. For the most appropriate care, call 911 or go to an emergency room and be seen immediately.  If you have thoughts of harming yourself or   others, help from a trained counselor is available 24 hours a day to talk to you and provide help through the [employer] Suicide and Crisis Lifeline. Call, text, or chat with a trained counselor by dialing [employer] or [phone]) [phone]. For more information on the [employer]   Suicide and Crisis Lifeline, visit: Access Information Management.org.   Patient: Sushila Stark  Patient : 1975  Patient Address: [address] [place] [address]  Patient Phone: [phone]  Clinician Response: Unavailable  Diagnosis: Unavailable  Diagnosis ICD: Unavailable     Patient Interview Questions and Responses: None available

## 2025-01-08 ENCOUNTER — OFFICE VISIT (OUTPATIENT)
Dept: FAMILY MEDICINE CLINIC | Facility: CLINIC | Age: 50
End: 2025-01-08
Payer: COMMERCIAL

## 2025-01-08 VITALS
DIASTOLIC BLOOD PRESSURE: 70 MMHG | HEIGHT: 67 IN | RESPIRATION RATE: 16 BRPM | WEIGHT: 229 LBS | OXYGEN SATURATION: 99 % | SYSTOLIC BLOOD PRESSURE: 132 MMHG | TEMPERATURE: 95.2 F | BODY MASS INDEX: 35.94 KG/M2 | HEART RATE: 75 BPM

## 2025-01-08 DIAGNOSIS — I10 PRIMARY HYPERTENSION: ICD-10-CM

## 2025-01-08 DIAGNOSIS — H65.111 NON-RECURRENT ACUTE ALLERGIC OTITIS MEDIA OF RIGHT EAR: Primary | ICD-10-CM

## 2025-01-08 DIAGNOSIS — J04.0 LARYNGITIS: ICD-10-CM

## 2025-01-08 PROCEDURE — 99213 OFFICE O/P EST LOW 20 MIN: CPT | Performed by: STUDENT IN AN ORGANIZED HEALTH CARE EDUCATION/TRAINING PROGRAM

## 2025-01-08 RX ORDER — AMLODIPINE BESYLATE 10 MG/1
10 TABLET ORAL NIGHTLY
Qty: 90 TABLET | Refills: 1 | Status: SHIPPED | OUTPATIENT
Start: 2025-01-08

## 2025-01-08 RX ORDER — AZITHROMYCIN 250 MG/1
TABLET, FILM COATED ORAL
Qty: 6 TABLET | Refills: 0 | Status: SHIPPED | OUTPATIENT
Start: 2025-01-08

## 2025-01-08 RX ORDER — PREDNISONE 20 MG/1
20 TABLET ORAL 2 TIMES DAILY
Qty: 10 TABLET | Refills: 0 | Status: SHIPPED | OUTPATIENT
Start: 2025-01-08

## 2025-01-08 RX ORDER — VALSARTAN 160 MG/1
160 TABLET ORAL DAILY
Qty: 90 TABLET | Refills: 1 | Status: SHIPPED | OUTPATIENT
Start: 2025-01-08

## 2025-01-08 NOTE — PROGRESS NOTES
"Chief Complaint  Laryngitis, Sore Throat, and Ear Fullness    Subjective         Sushila Stark is a 49 y.o. female who presents to [facility]    49 years old comes to the clinic today for an acute visit.    Complaining of 2 weeks of worsening laryngitis/ear pain and sinus fullness.  No fever, cough or any GI symptoms but does report worsening hoarseness specially at the evening time.  No chest congestion or any chest pain/shortness of breath as per patient.  No sick contact or recent travel either.    No other acute concerns    patient declined rapid swabs today due to 2 weeks chronicity    Objective   Vital Signs:   Vitals:    01/08/25 0737   BP: 132/70   BP Location: Left arm   Patient Position: Sitting   Cuff Size: Adult   Pulse: 75   Resp: 16   Temp: 95.2 °F (35.1 °C)   TempSrc: Temporal   SpO2: 99%   Weight: 104 kg (229 lb)   Height: 170.2 cm (67\")   PainSc: 0-No pain      Body mass index is 35.87 kg/m².   Wt Readings from Last 3 Encounters:   01/08/25 104 kg (229 lb)   07/15/24 98.6 kg (217 lb 6.4 oz)   06/03/24 102 kg (225 lb 6.4 oz)      BP Readings from Last 3 Encounters:   01/08/25 132/70   07/15/24 138/88   06/03/24 110/70        Patient Care Team:  Mendy Resendez APRN as PCP - General (Nurse Practitioner)     Physical Exam  HENT:      Right Ear: Tympanic membrane is erythematous.      Left Ear: Tympanic membrane is bulging.      Mouth/Throat:      Pharynx: Postnasal drip present. No pharyngeal swelling or posterior oropharyngeal erythema.      Comments: Clear sinus drainage  Pulmonary:      Effort: Pulmonary effort is normal.      Breath sounds: Normal breath sounds.                            Assessment and Plan   Diagnoses and all orders for this visit:    1. Non-recurrent acute allergic otitis media of right ear (Primary)  -     azithromycin (Zithromax Z-Nba) 250 MG tablet; Take 2 tablets by mouth on day 1, then 1 tablet daily on days 2-5  Dispense: 6 tablet; " Refill: 0  -     predniSONE (DELTASONE) 20 MG tablet; Take 1 tablet by mouth 2 (Two) Times a Day.  Dispense: 10 tablet; Refill: 0    2. Laryngitis  -     azithromycin (Zithromax Z-Nba) 250 MG tablet; Take 2 tablets by mouth on day 1, then 1 tablet daily on days 2-5  Dispense: 6 tablet; Refill: 0  -     predniSONE (DELTASONE) 20 MG tablet; Take 1 tablet by mouth 2 (Two) Times a Day.  Dispense: 10 tablet; Refill: 0      Declined rapid swab for COVID/flu/strep  We will treat patient empirically for otitis media  Supportive and conservative management also discussed, prednisone for postnasal drip and sinus congestion    Tobacco Use: High Risk (1/8/2025)    Patient History     Smoking Tobacco Use: Every Day     Smokeless Tobacco Use: Never     Passive Exposure: Not on file            Follow Up   No follow-ups on file.  Patient was given instructions and counseling regarding her condition or for health maintenance advice. Please see specific information pulled into the AVS if appropriate.

## 2025-01-28 ENCOUNTER — OFFICE VISIT (OUTPATIENT)
Dept: FAMILY MEDICINE CLINIC | Facility: CLINIC | Age: 50
End: 2025-01-28
Payer: COMMERCIAL

## 2025-01-28 VITALS
BODY MASS INDEX: 36.27 KG/M2 | SYSTOLIC BLOOD PRESSURE: 164 MMHG | HEART RATE: 87 BPM | WEIGHT: 231.1 LBS | DIASTOLIC BLOOD PRESSURE: 92 MMHG | TEMPERATURE: 97.4 F | OXYGEN SATURATION: 98 % | HEIGHT: 67 IN

## 2025-01-28 DIAGNOSIS — R20.2 NUMBNESS AND TINGLING IN BOTH HANDS: ICD-10-CM

## 2025-01-28 DIAGNOSIS — R20.0 NUMBNESS AND TINGLING IN BOTH HANDS: ICD-10-CM

## 2025-01-28 DIAGNOSIS — J06.9 UPPER RESPIRATORY TRACT INFECTION, UNSPECIFIED TYPE: ICD-10-CM

## 2025-01-28 DIAGNOSIS — Z00.00 ANNUAL PHYSICAL EXAM: Primary | ICD-10-CM

## 2025-01-28 DIAGNOSIS — H65.111 NON-RECURRENT ACUTE ALLERGIC OTITIS MEDIA OF RIGHT EAR: ICD-10-CM

## 2025-01-28 DIAGNOSIS — F41.9 ANXIETY: ICD-10-CM

## 2025-01-28 DIAGNOSIS — J04.0 LARYNGITIS: ICD-10-CM

## 2025-01-28 DIAGNOSIS — Z79.899 MEDICATION MANAGEMENT: ICD-10-CM

## 2025-01-28 DIAGNOSIS — I10 PRIMARY HYPERTENSION: ICD-10-CM

## 2025-01-28 LAB
AMPHET+METHAMPHET UR QL: NEGATIVE
AMPHETAMINE INTERNAL CONTROL: ABNORMAL
AMPHETAMINES UR QL: NEGATIVE
BARBITURATE INTERNAL CONTROL: ABNORMAL
BARBITURATES UR QL SCN: NEGATIVE
BENZODIAZ UR QL SCN: POSITIVE
BENZODIAZEPINE INTERNAL CONTROL: ABNORMAL
BUPRENORPHINE INTERNAL CONTROL: ABNORMAL
BUPRENORPHINE SERPL-MCNC: NEGATIVE NG/ML
CANNABINOIDS SERPL QL: NEGATIVE
COCAINE INTERNAL CONTROL: ABNORMAL
COCAINE UR QL: NEGATIVE
EXPIRATION DATE: ABNORMAL
Lab: ABNORMAL
MDMA (ECSTASY) INTERNAL CONTROL: ABNORMAL
MDMA UR QL SCN: NEGATIVE
METHADONE INTERNAL CONTROL: ABNORMAL
METHADONE UR QL SCN: NEGATIVE
METHAMPHETAMINE INTERNAL CONTROL: ABNORMAL
MORPHINE INTERNAL CONTROL: ABNORMAL
MORPHINE/OPIATES SCREEN, URINE: NEGATIVE
OXYCODONE INTERNAL CONTROL: ABNORMAL
OXYCODONE UR QL SCN: NEGATIVE
PCP UR QL SCN: NEGATIVE
PHENCYCLIDINE INTERNAL CONTROL: ABNORMAL
THC INTERNAL CONTROL: ABNORMAL

## 2025-01-28 RX ORDER — METHYLPREDNISOLONE ACETATE 80 MG/ML
80 INJECTION, SUSPENSION INTRA-ARTICULAR; INTRALESIONAL; INTRAMUSCULAR; SOFT TISSUE ONCE
Status: COMPLETED | OUTPATIENT
Start: 2025-01-28 | End: 2025-01-28

## 2025-01-28 RX ORDER — FLUCONAZOLE 150 MG/1
150 TABLET ORAL
Qty: 2 TABLET | Refills: 0 | Status: SHIPPED | OUTPATIENT
Start: 2025-01-28

## 2025-01-28 RX ORDER — TIZANIDINE HYDROCHLORIDE 4 MG/1
4 CAPSULE, GELATIN COATED ORAL 3 TIMES DAILY
Qty: 90 CAPSULE | Refills: 0 | Status: CANCELLED | OUTPATIENT
Start: 2025-01-28

## 2025-01-28 RX ORDER — TIZANIDINE HYDROCHLORIDE 4 MG/1
4 CAPSULE, GELATIN COATED ORAL 3 TIMES DAILY
Qty: 90 CAPSULE | Refills: 0 | Status: SHIPPED | OUTPATIENT
Start: 2025-01-28

## 2025-01-28 RX ORDER — DOXYCYCLINE 100 MG/1
100 CAPSULE ORAL 2 TIMES DAILY
Qty: 14 CAPSULE | Refills: 0 | Status: SHIPPED | OUTPATIENT
Start: 2025-01-28

## 2025-01-28 RX ORDER — METHYLPREDNISOLONE ACETATE 80 MG/ML
80 INJECTION, SUSPENSION INTRA-ARTICULAR; INTRALESIONAL; INTRAMUSCULAR; SOFT TISSUE ONCE
Status: DISCONTINUED | OUTPATIENT
Start: 2025-01-28 | End: 2025-01-28

## 2025-01-28 RX ORDER — NEOMYCIN SULFATE, POLYMYXIN B SULFATE, HYDROCORTISONE 3.5; 10000; 1 MG/ML; [USP'U]/ML; MG/ML
3 SOLUTION/ DROPS AURICULAR (OTIC) 4 TIMES DAILY
Qty: 10 ML | Refills: 0 | Status: SHIPPED | OUTPATIENT
Start: 2025-01-28

## 2025-01-28 RX ADMIN — CYANOCOBALAMIN 1000 MCG: 1000 INJECTION, SOLUTION INTRAMUSCULAR; SUBCUTANEOUS at 07:21

## 2025-01-28 RX ADMIN — METHYLPREDNISOLONE ACETATE 80 MG: 80 INJECTION, SUSPENSION INTRA-ARTICULAR; INTRALESIONAL; INTRAMUSCULAR; SOFT TISSUE at 07:37

## 2025-01-28 NOTE — PROGRESS NOTES
Sushila Stark presents to [facility] who presents to the clinic for annual physical.      History of Present Illness  This is a 49 year old female who presents to the clinic for annual physical.     Hypertension: Patient states that she has not taken her blood pressure medication this morning and actually missed a dose yesterday morning, but states she does monitor it at home and it is typically running really well and within good range in the 120s to 130s systolic.  This is drastically improved from previous and states that she is doing well on the medication changes that we had made in the past.  She remains on valsartan 160 mg daily, furosemide 80 mg twice daily, and amlodipine 10 mg nightly.  States that she is doing really well on all of these medications and is not having any issues.    Mental health: Patient also states that she is doing well from a mental health standpoint.  She remains on Lexapro during the day, as needed Xanax, and as needed Ambien.  She relies more on the Xanax to help her at night because mainly the issue is her mind goes into racing, but states that she has this pretty well-controlled and does not currently have any issues.    Patient states that she has been battling an upper respiratory system infection actually for about a month.  Patient states that she actually was seen by another provider in this office, was prescribed a Z-Nba and a course of steroids, and states that she felt a little bit better but then all of her symptoms, ranging back.  Patient states that she started with a fever but that has since resolved and her main issues is an extremely hoarse voice, she is got a lot of pressure in her sinuses, a really bad headache, and her ears still feel full and feels like she is underwater.  She has tried all the over-the-counter medications and states that none of them have given her much relief.  States that when she is able to get out some  "drainage, it is still dark in color and occasionally green.  Denies any lower respiratory symptoms, no chest congestion, no shortness of breath or chest pain.  No GI involvement.    Patient states that she is also fallen off the wagon in regards to her weight, states that over the wintertime she is not making a lot of comfort foods and states that she has gained her weight back.  She is also due for blood work which she will be getting in the next few weeks but also states that she is going to get back onto her diet regimen and work on that as well.    The following portions of the patient's history were personally reviewed and updated as appropriate: allergies, current medications, past medical history, past surgical history, past family history, and past social history.       Objective   Vital Signs:   /92 (BP Location: Left arm, Patient Position: Sitting, Cuff Size: Adult)   Pulse 87   Temp 97.4 °F (36.3 °C)   Ht 170.2 cm (67.01\")   Wt 105 kg (231 lb 1.6 oz)   SpO2 98%   BMI 36.19 kg/m²     Body mass index is 36.19 kg/m².    All labs, imaging, test results, and specialty provider notes reviewed with patient.     Physical Exam  Vitals reviewed.   Constitutional:       Appearance: Normal appearance.   Cardiovascular:      Rate and Rhythm: Normal rate and regular rhythm.      Pulses: Normal pulses.      Heart sounds: Normal heart sounds.   Pulmonary:      Effort: Pulmonary effort is normal.      Breath sounds: Normal breath sounds.   Neurological:      General: No focal deficit present.      Mental Status: She is alert and oriented to person, place, and time.                  Class 2 Severe Obesity (BMI >=35 and <=39.9). Obesity-related health conditions include the following: hypertension, impaired fasting glucose, and osteoarthritis. Obesity is unchanged. BMI is is above average; BMI management plan is completed. We discussed portion control and increasing exercise.            Assessment and " Plan:  Diagnoses and all orders for this visit:    1. Annual physical exam (Primary)    2. Numbness and tingling in both hands  Comments:  Improved currently, will refill tizanadine to use as needed.  Orders:  -     TiZANidine (ZANAFLEX) 4 MG capsule; Take 1 capsule by mouth 3 (Three) Times a Day.  Dispense: 90 capsule; Refill: 0    3. Medication management  -     POC Medline 12 Panel Urine Drug Screen    4. Upper respiratory tract infection, unspecified type  Comments:  Still persistent, will give another round of antibiotics, steroid injection, and eardrops patient has ear infection as wellDiflucan for possible yeast infection  Orders:  -     doxycycline (VIBRAMYCIN) 100 MG capsule; Take 1 capsule by mouth 2 (Two) Times a Day.  Dispense: 14 capsule; Refill: 0  -     neomycin-polymyxin-hydrocortisone (CORTISPORIN) 3.5-10869-1 otic solution; Administer 3 drops into both ears 4 (Four) Times a Day.  Dispense: 10 mL; Refill: 0  -     fluconazole (Diflucan) 150 MG tablet; Take 1 tablet by mouth Every 72 (Seventy-Two) Hours.  Dispense: 2 tablet; Refill: 0  -     methylPREDNISolone acetate (DEPO-medrol) injection 80 mg    5. Laryngitis    6. Primary hypertension  Assessment & Plan:  Blood pressure not controlled in the office, but she has not taken her medication yet this morning and it is well-controlled at home.  Patient to continue to monitor at home, report if her blood pressure increases over 140/90 on a consistent basis.  Go back to taking medication regularly and stay on valsartan 160 mg daily, amlodipine 10 mg nightly, and furosemide 80 mg twice a day.  Strict ER precautions, recommend low-salt diet and increased exercise.      7. Anxiety  Assessment & Plan:  Well-controlled on Lexapro and as needed Xanax.  UDS/consent updated today, Carlos reviewed.  Also continue as needed Ambien, discussed risks of taking Xanax and Ambien together.      8. Non-recurrent acute allergic otitis media of right ear  -      neomycin-polymyxin-hydrocortisone (CORTISPORIN) 3.5-52961-6 otic solution; Administer 3 drops into both ears 4 (Four) Times a Day.  Dispense: 10 mL; Refill: 0    Anticipatory Guidelines discussed with patient.    Discussed getting adequate exercise, reduced TV/electronic time, car safety including seat belt use, sexual activity (if applicable), and smoking/alcohol use (if applicable).      Follow Up:  Return in about 6 months (around 7/28/2025).    Patient was given instructions and counseling regarding her condition or for health maintenance advice. Please see specific information pulled into the AVS if appropriate.

## 2025-01-28 NOTE — ASSESSMENT & PLAN NOTE
Blood pressure not controlled in the office, but she has not taken her medication yet this morning and it is well-controlled at home.  Patient to continue to monitor at home, report if her blood pressure increases over 140/90 on a consistent basis.  Go back to taking medication regularly and stay on valsartan 160 mg daily, amlodipine 10 mg nightly, and furosemide 80 mg twice a day.  Strict ER precautions, recommend low-salt diet and increased exercise.

## 2025-01-28 NOTE — ASSESSMENT & PLAN NOTE
Well-controlled on Lexapro and as needed Xanax.  UDS/consent updated today, Carlos reviewed.  Also continue as needed Ambien, discussed risks of taking Xanax and Ambien together.

## 2025-02-24 ENCOUNTER — TELEPHONE (OUTPATIENT)
Dept: FAMILY MEDICINE CLINIC | Facility: CLINIC | Age: 50
End: 2025-02-24
Payer: COMMERCIAL

## 2025-02-24 NOTE — TELEPHONE ENCOUNTER
Pharmacy Name: [facility] - [place], [place] - [address] - [phone]  - [phone]        Pharmacy representative name: JESSICA    Pharmacy representative phone number: [phone]     What medication are you calling in regards to: TIZANIDINE 4 MG CAPSULES    What question does the pharmacy have: CALLER STATES PATIENT'S INSURANCE DOES NOT COVER CAPSULE FORM OF MEDICATION. PATIENT'S INSURANCE REQUIRES TABLET FORM OF MEDICATION    Who is the provider that prescribed the medication: GUDELIA KOVACS    Additional notes: CALLER STATES THAT SHE CALLED ON 01.28.2025 AND SAID A MESSAGE WAS LEFT BUT NEVER HEARD BACK. NO MESSAGE SHOWN IN CHART. PLEASE ADVISE.

## 2025-05-27 DIAGNOSIS — F51.01 PRIMARY INSOMNIA: ICD-10-CM

## 2025-05-28 RX ORDER — ZOLPIDEM TARTRATE 10 MG/1
TABLET ORAL
Qty: 30 TABLET | Refills: 2 | Status: SHIPPED | OUTPATIENT
Start: 2025-05-28

## 2025-06-20 DIAGNOSIS — R60.0 BILATERAL LOWER EXTREMITY EDEMA: ICD-10-CM

## 2025-06-20 DIAGNOSIS — I10 PRIMARY HYPERTENSION: ICD-10-CM

## 2025-06-20 DIAGNOSIS — E55.9 VITAMIN D DEFICIENCY: Chronic | ICD-10-CM

## 2025-06-20 NOTE — TELEPHONE ENCOUNTER
Upcoming Appts  With Family Medicine (SHIRAZ Crowe)  07/29/2025 at 7:30 AM  Last Office Visit - This Dept  1/28/2025 Mendy Resendez APRN

## 2025-06-23 RX ORDER — AMLODIPINE BESYLATE 10 MG/1
10 TABLET ORAL EVERY EVENING
Qty: 90 TABLET | Refills: 1 | Status: SHIPPED | OUTPATIENT
Start: 2025-06-23

## 2025-06-23 RX ORDER — ERGOCALCIFEROL 1.25 MG/1
50000 CAPSULE, LIQUID FILLED ORAL WEEKLY
Qty: 13 CAPSULE | Refills: 1 | Status: SHIPPED | OUTPATIENT
Start: 2025-06-23

## 2025-06-23 RX ORDER — FUROSEMIDE 80 MG/1
80 TABLET ORAL 2 TIMES DAILY
Qty: 180 TABLET | Refills: 1 | Status: SHIPPED | OUTPATIENT
Start: 2025-06-23

## 2025-07-07 RX ORDER — POTASSIUM CHLORIDE 1500 MG/1
20 TABLET, EXTENDED RELEASE ORAL 2 TIMES DAILY
Qty: 180 TABLET | Refills: 1 | Status: SHIPPED | OUTPATIENT
Start: 2025-07-07

## 2025-07-29 ENCOUNTER — OFFICE VISIT (OUTPATIENT)
Dept: FAMILY MEDICINE CLINIC | Facility: CLINIC | Age: 50
End: 2025-07-29
Payer: COMMERCIAL

## 2025-07-29 VITALS
WEIGHT: 241.3 LBS | HEIGHT: 67 IN | BODY MASS INDEX: 37.87 KG/M2 | HEART RATE: 79 BPM | TEMPERATURE: 98 F | DIASTOLIC BLOOD PRESSURE: 86 MMHG | OXYGEN SATURATION: 99 % | SYSTOLIC BLOOD PRESSURE: 136 MMHG

## 2025-07-29 DIAGNOSIS — F33.1 MODERATE EPISODE OF RECURRENT MAJOR DEPRESSIVE DISORDER: ICD-10-CM

## 2025-07-29 DIAGNOSIS — F41.9 ANXIETY: ICD-10-CM

## 2025-07-29 DIAGNOSIS — I10 PRIMARY HYPERTENSION: ICD-10-CM

## 2025-07-29 DIAGNOSIS — R20.0 NUMBNESS AND TINGLING IN BOTH HANDS: ICD-10-CM

## 2025-07-29 DIAGNOSIS — R20.2 NUMBNESS AND TINGLING IN BOTH HANDS: ICD-10-CM

## 2025-07-29 DIAGNOSIS — F51.01 PRIMARY INSOMNIA: ICD-10-CM

## 2025-07-29 DIAGNOSIS — R73.01 IMPAIRED FASTING GLUCOSE: ICD-10-CM

## 2025-07-29 DIAGNOSIS — E55.9 VITAMIN D DEFICIENCY: ICD-10-CM

## 2025-07-29 DIAGNOSIS — E53.8 VITAMIN B12 DEFICIENCY: ICD-10-CM

## 2025-07-29 DIAGNOSIS — F41.0 PANIC ATTACKS: ICD-10-CM

## 2025-07-29 DIAGNOSIS — N95.1 MENOPAUSAL SYMPTOMS: Primary | ICD-10-CM

## 2025-07-29 DIAGNOSIS — E78.5 HYPERLIPIDEMIA, UNSPECIFIED HYPERLIPIDEMIA TYPE: ICD-10-CM

## 2025-07-29 PROCEDURE — 99214 OFFICE O/P EST MOD 30 MIN: CPT

## 2025-07-29 PROCEDURE — 96372 THER/PROPH/DIAG INJ SC/IM: CPT

## 2025-07-29 RX ORDER — VALSARTAN 160 MG/1
160 TABLET ORAL DAILY
Qty: 90 TABLET | Refills: 1 | Status: SHIPPED | OUTPATIENT
Start: 2025-07-29

## 2025-07-29 RX ORDER — ZOLPIDEM TARTRATE 10 MG/1
10 TABLET ORAL NIGHTLY PRN
Qty: 30 TABLET | Refills: 2 | Status: SHIPPED | OUTPATIENT
Start: 2025-07-29

## 2025-07-29 RX ORDER — ALPRAZOLAM 0.5 MG
0.5 TABLET ORAL 2 TIMES DAILY PRN
Qty: 30 TABLET | Refills: 0 | Status: SHIPPED | OUTPATIENT
Start: 2025-07-29

## 2025-07-29 RX ORDER — VENLAFAXINE HYDROCHLORIDE 75 MG/1
75 CAPSULE, EXTENDED RELEASE ORAL DAILY
Qty: 30 CAPSULE | Refills: 2 | Status: SHIPPED | OUTPATIENT
Start: 2025-07-29

## 2025-07-29 RX ADMIN — CYANOCOBALAMIN 1000 MCG: 1000 INJECTION, SOLUTION INTRAMUSCULAR; SUBCUTANEOUS at 07:33

## 2025-07-29 NOTE — ASSESSMENT & PLAN NOTE
Not controlled and think likely contributable to menopausal symptoms.  Will go ahead and test full hormonal panel, based off results, could consider other types of treatment if really needed at that time.  Will also go ahead and transition patient from Lexapro to Effexor.  Discussed this transitioning into 4 to 6 weeks, patient will follow-up at that time.  Will also do a one-time refill of her Xanax to use only as needed, continue to use Ambien as needed, and if needed at that time could consider other avenues and/or other treatments such as an adjunct medication if needed.  Strict ER precautions for any suicidal thoughts or ideation.

## 2025-07-29 NOTE — PROGRESS NOTES
Sushila Stark presents to [facility] who presents to clinic for follow-up.      History of Present Illness  This is a 50-year-old female who presents to clinic for follow-up.    Hypertension/hyperlipidemia: Blood pressure stable today 136/86.  She remains on her amlodipine, her valsartan, her furosemide, and her potassium.  She is doing really well on these and not having any major issues.  States the swelling in her feet and ankles is pretty well-controlled.  Denies any chest pain or shortness of breath.    Mental health: Patient states that kind of out of nowhere, her mental health has been not good for the past few months.  Patient states that she feels extremely low and depressed, has no motivation or energy to do anything, and often times when she gets home from work she wants to go straight to bed.  She also states she been more agitated and irritable, is having difficulty in going to sleep unless she takes her Ambien which she does not like to take regularly.  Patient states that she is tired of feeling this way, is not sure what the changes, when asked further she has been having some hot flashes as well and night sweats, she has had a hysterectomy but they did leave her ovaries.  Denies suicidal thoughts or ideation.    Patient states that she is also ready to get both of her arms/hands looked into.  States she was told in the past that she does have some nerve damage and likely will need surgical repair of probably carpal tunnel syndrome, did have an EMG ordered at last visit but states that she did not complete.  Would like to have that looked into and get this corrected as it is causing her severe pain and discomfort, specifically at night when she goes to sleep, states that she will often times wake up and her hands will be completely numb and it takes a while for that to regain.    Patient due for full panel blood work, will order.  Up-to-date on other preventative  "screenings per refuses vaccines or immunizations.    The following portions of the patient's history were personally reviewed and updated as appropriate: allergies, current medications, past medical history, past surgical history, past family history, and past social history.       Objective   Vital Signs:   /86 (BP Location: Left arm, Patient Position: Sitting, Cuff Size: Adult)   Pulse 79   Temp 98 °F (36.7 °C)   Ht 170.2 cm (67.01\")   Wt 109 kg (241 lb 4.8 oz)   SpO2 99%   BMI 37.78 kg/m²     Body mass index is 37.78 kg/m².    All labs, imaging, test results, and specialty provider notes reviewed with patient.     Physical Exam  Vitals reviewed.   Constitutional:       Appearance: Normal appearance.   Cardiovascular:      Rate and Rhythm: Normal rate and regular rhythm.      Pulses: Normal pulses.      Heart sounds: Normal heart sounds.   Pulmonary:      Effort: Pulmonary effort is normal.      Breath sounds: Normal breath sounds.   Neurological:      General: No focal deficit present.      Mental Status: She is alert and oriented to person, place, and time.                               Assessment and Plan:  Diagnoses and all orders for this visit:    1. Menopausal symptoms (Primary)  -     venlafaxine XR (Effexor XR) 75 MG 24 hr capsule; Take 1 capsule by mouth Daily.  Dispense: 30 capsule; Refill: 2  -     FSH & LH; Future  -     Estrogens, Total; Future  -     Testosterone, Total, Women, Children, and Hypogonadal Males; Future    2. Vitamin B12 deficiency  -     Vitamin B12 & Folate; Future    3. Primary hypertension  -     valsartan (DIOVAN) 160 MG tablet; Take 1 tablet by mouth Daily.  Dispense: 90 tablet; Refill: 1  -     CBC Auto Differential; Future  -     Comprehensive Metabolic Panel; Future  -     TSH Rfx On Abnormal To Free T4; Future  -     Urinalysis With Culture If Indicated -; Future    4. Numbness and tingling in both hands  Comments:  Likely carpal tunnel related, order EMG, based " off results likely refer to Ortho for treatment.  Use braces.  Orders:  -     EMG & Nerve Conduction Test; Future    5. Primary insomnia  -     zolpidem (AMBIEN) 10 MG tablet; Take 1 tablet by mouth At Night As Needed for Sleep.  Dispense: 30 tablet; Refill: 2    6. Anxiety  -     venlafaxine XR (Effexor XR) 75 MG 24 hr capsule; Take 1 capsule by mouth Daily.  Dispense: 30 capsule; Refill: 2  -     ALPRAZolam (XANAX) 0.5 MG tablet; Take 1 tablet by mouth 2 (Two) Times a Day As Needed for Anxiety.  Dispense: 30 tablet; Refill: 0    7. Moderate episode of recurrent major depressive disorder  Assessment & Plan:  Not controlled and think likely contributable to menopausal symptoms.  Will go ahead and test full hormonal panel, based off results, could consider other types of treatment if really needed at that time.  Will also go ahead and transition patient from Lexapro to Effexor.  Discussed this transitioning into 4 to 6 weeks, patient will follow-up at that time.  Will also do a one-time refill of her Xanax to use only as needed, continue to use Ambien as needed, and if needed at that time could consider other avenues and/or other treatments such as an adjunct medication if needed.  Strict ER precautions for any suicidal thoughts or ideation.      Orders:  -     venlafaxine XR (Effexor XR) 75 MG 24 hr capsule; Take 1 capsule by mouth Daily.  Dispense: 30 capsule; Refill: 2    8. Vitamin D deficiency  -     Vitamin D,25-Hydroxy; Future    9. Hyperlipidemia, unspecified hyperlipidemia type  -     Lipid Panel; Future    10. Impaired fasting glucose  -     Hemoglobin A1c; Future    11. Panic attacks  -     ALPRAZolam (XANAX) 0.5 MG tablet; Take 1 tablet by mouth 2 (Two) Times a Day As Needed for Anxiety.  Dispense: 30 tablet; Refill: 0          Follow Up:  No follow-ups on file.    Patient was given instructions and counseling regarding her condition or for health maintenance advice. Please see specific information pulled  into the AVS if appropriate.

## 2025-07-31 ENCOUNTER — LAB (OUTPATIENT)
Dept: LAB | Facility: HOSPITAL | Age: 50
End: 2025-07-31
Payer: COMMERCIAL

## 2025-07-31 DIAGNOSIS — E78.5 HYPERLIPIDEMIA, UNSPECIFIED HYPERLIPIDEMIA TYPE: ICD-10-CM

## 2025-07-31 DIAGNOSIS — I10 PRIMARY HYPERTENSION: ICD-10-CM

## 2025-07-31 DIAGNOSIS — N95.1 MENOPAUSAL SYMPTOMS: ICD-10-CM

## 2025-07-31 DIAGNOSIS — E53.8 VITAMIN B12 DEFICIENCY: ICD-10-CM

## 2025-07-31 DIAGNOSIS — R73.01 IMPAIRED FASTING GLUCOSE: ICD-10-CM

## 2025-07-31 DIAGNOSIS — E55.9 VITAMIN D DEFICIENCY: ICD-10-CM

## 2025-07-31 LAB
25(OH)D3 SERPL-MCNC: 23.6 NG/ML (ref 30–100)
ALBUMIN SERPL-MCNC: 4 G/DL (ref 3.5–5.2)
ALBUMIN/GLOB SERPL: 1.2 G/DL
ALP SERPL-CCNC: 69 U/L (ref 39–117)
ALT SERPL W P-5'-P-CCNC: 45 U/L (ref 1–33)
ANION GAP SERPL CALCULATED.3IONS-SCNC: 9.9 MMOL/L (ref 5–15)
AST SERPL-CCNC: 49 U/L (ref 1–32)
BACTERIA UR QL AUTO: NORMAL /HPF
BASOPHILS # BLD AUTO: 0.07 10*3/MM3 (ref 0–0.2)
BASOPHILS NFR BLD AUTO: 0.7 % (ref 0–1.5)
BILIRUB SERPL-MCNC: 0.3 MG/DL (ref 0–1.2)
BILIRUB UR QL STRIP: NEGATIVE
BUN SERPL-MCNC: 7 MG/DL (ref 6–20)
BUN/CREAT SERPL: 10.9 (ref 7–25)
CALCIUM SPEC-SCNC: 9.2 MG/DL (ref 8.6–10.5)
CHLORIDE SERPL-SCNC: 106 MMOL/L (ref 98–107)
CHOLEST SERPL-MCNC: 204 MG/DL (ref 0–200)
CLARITY UR: CLEAR
CO2 SERPL-SCNC: 23.1 MMOL/L (ref 22–29)
COLOR UR: YELLOW
CREAT SERPL-MCNC: 0.64 MG/DL (ref 0.57–1)
DEPRECATED RDW RBC AUTO: 40.5 FL (ref 37–54)
EGFRCR SERPLBLD CKD-EPI 2021: 109.2 ML/MIN/1.73
EOSINOPHIL # BLD AUTO: 0.41 10*3/MM3 (ref 0–0.4)
EOSINOPHIL NFR BLD AUTO: 4.1 % (ref 0.3–6.2)
ERYTHROCYTE [DISTWIDTH] IN BLOOD BY AUTOMATED COUNT: 12.9 % (ref 12.3–15.4)
FOLATE SERPL-MCNC: 9.12 NG/ML (ref 4.78–24.2)
FSH SERPL-ACNC: 4.31 MIU/ML
GLOBULIN UR ELPH-MCNC: 3.3 GM/DL
GLUCOSE SERPL-MCNC: 107 MG/DL (ref 65–99)
GLUCOSE UR STRIP-MCNC: NEGATIVE MG/DL
HBA1C MFR BLD: 5.6 % (ref 4.8–5.6)
HCT VFR BLD AUTO: 41.7 % (ref 34–46.6)
HDLC SERPL-MCNC: 37 MG/DL (ref 40–60)
HGB BLD-MCNC: 13.4 G/DL (ref 12–15.9)
HGB UR QL STRIP.AUTO: NEGATIVE
HOLD SPECIMEN: NORMAL
HYALINE CASTS UR QL AUTO: NORMAL /LPF
IMM GRANULOCYTES # BLD AUTO: 0.04 10*3/MM3 (ref 0–0.05)
IMM GRANULOCYTES NFR BLD AUTO: 0.4 % (ref 0–0.5)
KETONES UR QL STRIP: NEGATIVE
LDLC SERPL CALC-MCNC: 142 MG/DL (ref 0–100)
LDLC/HDLC SERPL: 3.78 {RATIO}
LEUKOCYTE ESTERASE UR QL STRIP.AUTO: NEGATIVE
LH SERPL-ACNC: 8.76 MIU/ML
LYMPHOCYTES # BLD AUTO: 3.27 10*3/MM3 (ref 0.7–3.1)
LYMPHOCYTES NFR BLD AUTO: 33.1 % (ref 19.6–45.3)
MCH RBC QN AUTO: 27.8 PG (ref 26.6–33)
MCHC RBC AUTO-ENTMCNC: 32.1 G/DL (ref 31.5–35.7)
MCV RBC AUTO: 86.5 FL (ref 79–97)
MONOCYTES # BLD AUTO: 0.77 10*3/MM3 (ref 0.1–0.9)
MONOCYTES NFR BLD AUTO: 7.8 % (ref 5–12)
NEUTROPHILS NFR BLD AUTO: 5.33 10*3/MM3 (ref 1.7–7)
NEUTROPHILS NFR BLD AUTO: 53.9 % (ref 42.7–76)
NITRITE UR QL STRIP: NEGATIVE
NRBC BLD AUTO-RTO: 0 /100 WBC (ref 0–0.2)
PH UR STRIP.AUTO: 7 [PH] (ref 5–8)
PLATELET # BLD AUTO: 314 10*3/MM3 (ref 140–450)
PMV BLD AUTO: 10.7 FL (ref 6–12)
POTASSIUM SERPL-SCNC: 3.8 MMOL/L (ref 3.5–5.2)
PROT SERPL-MCNC: 7.3 G/DL (ref 6–8.5)
PROT UR QL STRIP: ABNORMAL
RBC # BLD AUTO: 4.82 10*6/MM3 (ref 3.77–5.28)
RBC # UR STRIP: NORMAL /HPF
REF LAB TEST METHOD: NORMAL
SODIUM SERPL-SCNC: 139 MMOL/L (ref 136–145)
SP GR UR STRIP: 1.02 (ref 1–1.03)
SQUAMOUS #/AREA URNS HPF: NORMAL /HPF
TRIGL SERPL-MCNC: 135 MG/DL (ref 0–150)
TSH SERPL DL<=0.05 MIU/L-ACNC: 1.85 UIU/ML (ref 0.27–4.2)
UROBILINOGEN UR QL STRIP: ABNORMAL
VIT B12 BLD-MCNC: 749 PG/ML (ref 211–946)
VLDLC SERPL-MCNC: 25 MG/DL (ref 5–40)
WBC # UR STRIP: NORMAL /HPF
WBC NRBC COR # BLD AUTO: 9.89 10*3/MM3 (ref 3.4–10.8)

## 2025-07-31 PROCEDURE — 81001 URINALYSIS AUTO W/SCOPE: CPT

## 2025-07-31 PROCEDURE — 84403 ASSAY OF TOTAL TESTOSTERONE: CPT

## 2025-07-31 PROCEDURE — 80061 LIPID PANEL: CPT

## 2025-07-31 PROCEDURE — 83002 ASSAY OF GONADOTROPIN (LH): CPT

## 2025-07-31 PROCEDURE — 82672 ASSAY OF ESTROGEN: CPT

## 2025-07-31 PROCEDURE — 82306 VITAMIN D 25 HYDROXY: CPT

## 2025-07-31 PROCEDURE — 83036 HEMOGLOBIN GLYCOSYLATED A1C: CPT

## 2025-07-31 PROCEDURE — 82607 VITAMIN B-12: CPT

## 2025-07-31 PROCEDURE — 83001 ASSAY OF GONADOTROPIN (FSH): CPT

## 2025-07-31 PROCEDURE — 82746 ASSAY OF FOLIC ACID SERUM: CPT

## 2025-07-31 PROCEDURE — 80050 GENERAL HEALTH PANEL: CPT

## 2025-07-31 PROCEDURE — 36415 COLL VENOUS BLD VENIPUNCTURE: CPT

## 2025-08-04 LAB — ESTROGEN SERPL-MCNC: 272 PG/ML

## 2025-08-08 LAB — TESTOST SERPL-MCNC: 21 NG/DL

## 2025-08-20 ENCOUNTER — HOSPITAL ENCOUNTER (OUTPATIENT)
Facility: HOSPITAL | Age: 50
Discharge: HOME | End: 2025-08-20
Payer: COMMERCIAL

## 2025-08-20 DIAGNOSIS — R20.0 NUMBNESS AND TINGLING IN BOTH HANDS: ICD-10-CM

## 2025-08-20 DIAGNOSIS — R20.2 NUMBNESS AND TINGLING IN BOTH HANDS: ICD-10-CM

## 2025-08-20 PROCEDURE — 95911 NRV CNDJ TEST 9-10 STUDIES: CPT

## 2025-08-20 PROCEDURE — 95886 MUSC TEST DONE W/N TEST COMP: CPT
